# Patient Record
Sex: MALE | Race: WHITE | HISPANIC OR LATINO | Employment: UNEMPLOYED | URBAN - METROPOLITAN AREA
[De-identification: names, ages, dates, MRNs, and addresses within clinical notes are randomized per-mention and may not be internally consistent; named-entity substitution may affect disease eponyms.]

---

## 2020-07-25 ENCOUNTER — APPOINTMENT (OUTPATIENT)
Dept: RADIOLOGY | Facility: MEDICAL CENTER | Age: 60
End: 2020-07-25
Attending: EMERGENCY MEDICINE
Payer: COMMERCIAL

## 2020-07-25 ENCOUNTER — HOSPITAL ENCOUNTER (OUTPATIENT)
Facility: MEDICAL CENTER | Age: 60
End: 2020-08-07
Attending: EMERGENCY MEDICINE | Admitting: HOSPITALIST
Payer: COMMERCIAL

## 2020-07-25 DIAGNOSIS — R79.82 ELEVATED C-REACTIVE PROTEIN (CRP): ICD-10-CM

## 2020-07-25 DIAGNOSIS — M25.50 POLYARTHRALGIA: ICD-10-CM

## 2020-07-25 DIAGNOSIS — R60.9 PERIPHERAL EDEMA: ICD-10-CM

## 2020-07-25 DIAGNOSIS — M10.9 ACUTE GOUT OF KNEE, UNSPECIFIED CAUSE, UNSPECIFIED LATERALITY: ICD-10-CM

## 2020-07-25 LAB
ALBUMIN SERPL BCP-MCNC: 3.7 G/DL (ref 3.2–4.9)
ALBUMIN/GLOB SERPL: 1.1 G/DL
ALP SERPL-CCNC: 98 U/L (ref 30–99)
ALT SERPL-CCNC: 18 U/L (ref 2–50)
ANION GAP SERPL CALC-SCNC: 14 MMOL/L (ref 7–16)
AST SERPL-CCNC: 16 U/L (ref 12–45)
BASOPHILS # BLD AUTO: 0.3 % (ref 0–1.8)
BASOPHILS # BLD: 0.06 K/UL (ref 0–0.12)
BILIRUB SERPL-MCNC: 0.2 MG/DL (ref 0.1–1.5)
BUN SERPL-MCNC: 16 MG/DL (ref 8–22)
CALCIUM SERPL-MCNC: 9.1 MG/DL (ref 8.5–10.5)
CHLORIDE SERPL-SCNC: 102 MMOL/L (ref 96–112)
CO2 SERPL-SCNC: 21 MMOL/L (ref 20–33)
CREAT SERPL-MCNC: 0.75 MG/DL (ref 0.5–1.4)
CRP SERPL HS-MCNC: 12.61 MG/DL (ref 0–0.75)
EOSINOPHIL # BLD AUTO: 0.09 K/UL (ref 0–0.51)
EOSINOPHIL NFR BLD: 0.5 % (ref 0–6.9)
ERYTHROCYTE [DISTWIDTH] IN BLOOD BY AUTOMATED COUNT: 49.1 FL (ref 35.9–50)
GLOBULIN SER CALC-MCNC: 3.5 G/DL (ref 1.9–3.5)
GLUCOSE SERPL-MCNC: 101 MG/DL (ref 65–99)
HCT VFR BLD AUTO: 38 % (ref 42–52)
HGB BLD-MCNC: 12.6 G/DL (ref 14–18)
IMM GRANULOCYTES # BLD AUTO: 0.12 K/UL (ref 0–0.11)
IMM GRANULOCYTES NFR BLD AUTO: 0.7 % (ref 0–0.9)
LIPASE SERPL-CCNC: 19 U/L (ref 11–82)
LYMPHOCYTES # BLD AUTO: 2.07 K/UL (ref 1–4.8)
LYMPHOCYTES NFR BLD: 11.6 % (ref 22–41)
MCH RBC QN AUTO: 29.2 PG (ref 27–33)
MCHC RBC AUTO-ENTMCNC: 33.2 G/DL (ref 33.7–35.3)
MCV RBC AUTO: 88 FL (ref 81.4–97.8)
MONOCYTES # BLD AUTO: 0.91 K/UL (ref 0–0.85)
MONOCYTES NFR BLD AUTO: 5.1 % (ref 0–13.4)
NEUTROPHILS # BLD AUTO: 14.66 K/UL (ref 1.82–7.42)
NEUTROPHILS NFR BLD: 81.8 % (ref 44–72)
NRBC # BLD AUTO: 0 K/UL
NRBC BLD-RTO: 0 /100 WBC
PLATELET # BLD AUTO: 422 K/UL (ref 164–446)
PMV BLD AUTO: 9.4 FL (ref 9–12.9)
POTASSIUM SERPL-SCNC: 3.7 MMOL/L (ref 3.6–5.5)
PROT SERPL-MCNC: 7.2 G/DL (ref 6–8.2)
RBC # BLD AUTO: 4.32 M/UL (ref 4.7–6.1)
SODIUM SERPL-SCNC: 137 MMOL/L (ref 135–145)
URATE SERPL-MCNC: 4.6 MG/DL (ref 2.5–8.3)
WBC # BLD AUTO: 17.9 K/UL (ref 4.8–10.8)

## 2020-07-25 PROCEDURE — 80053 COMPREHEN METABOLIC PANEL: CPT

## 2020-07-25 PROCEDURE — 86038 ANTINUCLEAR ANTIBODIES: CPT

## 2020-07-25 PROCEDURE — 81003 URINALYSIS AUTO W/O SCOPE: CPT

## 2020-07-25 PROCEDURE — 86140 C-REACTIVE PROTEIN: CPT

## 2020-07-25 PROCEDURE — 700102 HCHG RX REV CODE 250 W/ 637 OVERRIDE(OP): Performed by: EMERGENCY MEDICINE

## 2020-07-25 PROCEDURE — 87591 N.GONORRHOEAE DNA AMP PROB: CPT

## 2020-07-25 PROCEDURE — 99285 EMERGENCY DEPT VISIT HI MDM: CPT

## 2020-07-25 PROCEDURE — 83690 ASSAY OF LIPASE: CPT

## 2020-07-25 PROCEDURE — 84550 ASSAY OF BLOOD/URIC ACID: CPT

## 2020-07-25 PROCEDURE — 87491 CHLMYD TRACH DNA AMP PROBE: CPT

## 2020-07-25 PROCEDURE — 85652 RBC SED RATE AUTOMATED: CPT

## 2020-07-25 PROCEDURE — 71045 X-RAY EXAM CHEST 1 VIEW: CPT

## 2020-07-25 PROCEDURE — 85025 COMPLETE CBC W/AUTO DIFF WBC: CPT

## 2020-07-25 PROCEDURE — A9270 NON-COVERED ITEM OR SERVICE: HCPCS | Performed by: EMERGENCY MEDICINE

## 2020-07-25 RX ORDER — OXYCODONE HYDROCHLORIDE AND ACETAMINOPHEN 5; 325 MG/1; MG/1
1 TABLET ORAL ONCE
Status: COMPLETED | OUTPATIENT
Start: 2020-07-25 | End: 2020-07-25

## 2020-07-25 RX ORDER — ACETAMINOPHEN 500 MG
500 TABLET ORAL EVERY 8 HOURS PRN
Status: ON HOLD | COMMUNITY
End: 2020-07-30

## 2020-07-25 RX ORDER — IBUPROFEN 200 MG
200 TABLET ORAL EVERY 8 HOURS PRN
Status: ON HOLD | COMMUNITY
End: 2020-07-30

## 2020-07-25 RX ADMIN — OXYCODONE HYDROCHLORIDE AND ACETAMINOPHEN 1 TABLET: 5; 325 TABLET ORAL at 21:52

## 2020-07-25 SDOH — HEALTH STABILITY: MENTAL HEALTH: HOW MANY STANDARD DRINKS CONTAINING ALCOHOL DO YOU HAVE ON A TYPICAL DAY?: 1 OR 2

## 2020-07-25 SDOH — HEALTH STABILITY: MENTAL HEALTH: HOW OFTEN DO YOU HAVE A DRINK CONTAINING ALCOHOL?: 4 OR MORE TIMES A WEEK

## 2020-07-25 NOTE — LETTER
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 Be specific and answer in detail. 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 N/A Witnesses to the Accident  Kofi Robret   Nature of Injury or Occupational Disease  Workers' Compensation Part(s) of Body Injured or Affected  Hand (R), Lower Arm (R), Shoulder (R)    I CERTIFY THAT THE ABOVE IS TRUE AND CORRECT TO THE BEST OF MY KNOWLEDGE AND THAT I HAVE PROVIDED THIS INFORMATION IN ORDER TO OBTAIN THE BENEFITS OF [place] " [place]IVE OR CHAPTER 617 OF [place]).  I HEREBY AUTHORIZE ANY PHYSICIAN, CHIROPRACTOR, SURGEON, PRACTITIONER, OR OTHER PERSON, ANY HOSPITAL, INCLUDING [facility] OR [facility], ANY MEDICAL SERVICE ORGANIZATION, ANY INSURANCE COMPANY, OR OTHER INSTITUTION OR ORGANIZATION TO RELEASE TO EACH OTHER, ANY MEDICAL OR OTHER INFORMATION, INCLUDING BENEFITS PAID OR PAYABLE, PERTINENT TO THIS INJURY OR DISEASE, EXCEPT INFORMATION RELATIVE TO DIAGNOSIS, TREATMENT AND/OR COUNSELING FOR AIDS, PSYCHOLOGICAL CONDITIONS, ALCOHOL OR CONTROLLED SUBSTANCES, FOR WHICH I MUST GIVE SPECIFIC AUTHORIZATION.  A PHOTOSTAT OF THIS AUTHORIZATION SHALL BE AS VALID AS THE ORIGINAL.  Date  07/26/2020                           Place    [facility]                      Employee’s Signature   THIS REPORT MUST BE COMPLETED AND MAILED WITHIN 3 WORKING DAYS OF TREATMENT   Place [facility] DEC[facility]                                                                             Name of Facility [facility]   Date  7/25/2020 Diagnosis  (R60.9) Peripheral edema  (R79.82) Elevated C-reactive protein (CRP)  (M25.50) Polyarthralgia Is there evidence the injured employee was under the influence of alcohol and/or another controlled substance at the time of accident?   Hour  8:40 AM Description of Injury or Disease  Peripheral edema  Elevated C-reactive protein (CRP)  Polyarthralgia No   Treatment  Analgesics, hospitalization for work up  Have you advised the patient to remain off work five days or more?         Yes   X-Ray Findings  Negative If Yes   From Date    To Date      From information given by the employee, together with medical evidence, can you directly connect this injury or occupational disease as job incurred?   Comments:unknown If No, is employee capable of: Full Duty  No Modified Duty  No   Is additional medical care by a physician indicated? Yes If Modified Duty, Specify any  "Limitations / Restrictions       Do you know of any previous injury or disease contributing to this condition or occupational disease? No    Date 7/26/2020 Print Doctor’s Name CanJunaid I certify the employer’s copy of this form was mailed on:   Address [address] [place] [address]  [place] [id number] [phone] INSURER’S USE ONLY   Provider’s Tax ID Number   Telephone Dept: [phone]    Doctor’s Signature isidoro-TYSHAWN Clark M.D. Degree  M.D.      Form C-4 (rev.10/07)                                                                         BRIEF DESCRIPTION OF RIGHTS AND BENEFITS  (Pursuant to [place]RS 616C.050)    Notice of Injury or Occupational Disease (Incident Report Form C-1): If an injury or occupational disease (OD) arises out of and in the course of employment, you must provide written notice to your employer as soon as practicable, but no later than 7 days after the accident or OD. Your employer shall maintain a sufficient supply of the required forms.    Claim for Compensation (Form C-4): If medical treatment is sought, the form C-4 is available at the place of initial treatment. A completed \"Claim for Compensation\" (Form C-4) must be filed within 90 days after an accident or OD. The treating physician or chiropractor must, within 3 working days after treatment, complete and mail to the employer, the employer's insurer and third-party , the Claim for Compensation.    Medical Treatment: If you require medical treatment for your on-the-job injury or OD, you may be required to select a physician or chiropractor from a list provided by your workers’ compensation insurer, if it has contracted with an Organization for Managed Care (MCO) or Preferred Provider Organization (PPO) or providers of health care. If your employer has not entered into a contract with an MCO or PPO, you may select a physician or chiropractor from the Panel of Physicians and Chiropractors. Any medical costs related to your " industrial injury or OD will be paid by your insurer.    Temporary Total Disability (TTD): If your doctor has certified that you are unable to work for a period of at least 5 consecutive days, or 5 cumulative days in a 20-day period, or places restrictions on you that your employer does not accommodate, you may be entitled to TTD compensation.    Temporary Partial Disability (TPD): If the wage you receive upon reemployment is less than the compensation for TTD to which you are entitled, the insurer may be required to pay you TPD compensation to make up the difference. TPD can only be paid for a maximum of 24 months.    Permanent Partial Disability (PPD): When your medical condition is stable and there is an indication of a PPD as a result of your injury or OD, within 30 days, your insurer must arrange for an evaluation by a rating physician or chiropractor to determine the degree of your PPD. The amount of your PPD award depends on the date of injury, the results of the PPD evaluation and your age and wage.    Permanent Total Disability (PTD): If you are medically certified by a treating physician or chiropractor as permanently and totally disabled and have been granted a PTD status by your insurer, you are entitled to receive monthly benefits not to exceed 66 2/3% of your average monthly wage. The amount of your PTD payments is subject to reduction if you previously received a PPD award.    Vocational Rehabilitation Services: You may be eligible for vocational rehabilitation services if you are unable to return to the job due to a permanent physical impairment or permanent restrictions as a result of your injury or occupational disease.    Transportation and Per Nikky Reimbursement: You may be eligible for travel expenses and per nikky associated with medical treatment.    Reopening: You may be able to reopen your claim if your condition worsens after claim closure.     Appeal Process: If you disagree with a written  determination issued by the insurer or the insurer does not respond to your request, you may appeal to the Department of Administration, , by following the instructions contained in your determination letter. You must appeal the determination within 70 days from the date of the determination letter at [address], [address], [place], [place] [address], or [address], [address], [place], [place] [address]. If you disagree with the  decision, you may appeal to the Department of Administration, . You must file your appeal within 30 days from the date of the  decision letter at [address], [address], [place], [place] [address], or [address], [address], [place], [place] [address]. If you disagree with a decision of an , you may file a petition for judicial review with the District Court. You must do so within 30 days of the Appeal Officer’s decision. You may be represented by an  at your own expense or you may contact the [facility] for possible representation.    [place]  for Injured Workers (NAIW): If you disagree with a  decision, you may request that NAIW represent you without charge at an  Hearing. For information regarding denial of benefits, you may contact the [facility] at: [address], [address], [place] [address], [phone], or [address] [place], [address], [place] [address], [phone]    To File a Complaint with the Division: If you wish to file a complaint with the  of the Division of Industrial Relations (DIR),  please contact the Workers’ Compensation Section, [address], [place], [place] [address], telephone ([phone] [phone], or [address], [address], [place], [place] [address], telephone [phone].    For assistance with Workers’ Compensation Issues: You may contact the Office of the  [facility] Consumer Health Assistance, [address]", [address], [place] [address], Toll Free 1-653.580.2383, Web site: http://govCommunity Memorial Hospital.[facility].[email or web address]., E-mail [email or web address].  D-2 (rev. 06/18)              __________________________________________________________________                                    _________________            Employee Name / Signature                                                                                                                            Date

## 2020-07-25 NOTE — LETTER
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 Be specific and answer in detail. 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 N/A Witnesses to the Accident  Kofi Robert   Nature of Injury or Occupational Disease  Workers' Compensation Part(s) of Body Injured or Affected  Hand (R), Lower Arm (R), Shoulder (R)    I CERTIFY THAT THE ABOVE IS TRUE AND CORRECT TO THE BEST OF MY KNOWLEDGE AND THAT I HAVE PROVIDED THIS INFORMATION IN ORDER TO OBTAIN THE BENEFITS OF [place] 616[place], IN[place]IVE OR CHAPTER 617 OF [place]).  I HEREBY AUTHORIZE ANY PHYSICIAN, CHIROPRACTOR, SURGEON, PRACTITIONER, OR OTHER PERSON, ANY HOSPITAL, INCLUDING [facility] OR [facility], ANY MEDICAL SERVICE ORGANIZATION, ANY INSURANCE COMPANY, OR OTHER INSTITUTION OR ORGANIZATION TO RELEASE TO EACH OTHER, ANY MEDICAL OR OTHER INFORMATION, INCLUDING BENEFITS PAID OR PAYABLE, PERTINENT TO THIS INJURY OR DISEASE, EXCEPT INFORMATION RELATIVE TO DIAGNOSIS, TREATMENT AND/OR COUNSELING FOR AIDS, PSYCHOLOGICAL CONDITIONS, ALCOHOL OR CONTROLLED SUBSTANCES, FOR WHICH I MUST GIVE SPECIFIC AUTHORIZATION.  A PHOTOSTAT OF THIS AUTHORIZATION SHALL BE AS VALID AS THE ORIGINAL.  Date                              Place: [facility]                  Employee’s Signature:   THIS REPORT MUST BE COMPLETED AND MAILED WITHIN 3 WORKING DAYS OF TREATMENT   Place CL[facility] DECISION UNIT [facility]                                                                             Name of Facility [facility]   Date  7/25/2020 Diagnosis  (R60.9) Peripheral edema  (R79.82) Elevated C-reactive protein (CRP)  (M25.50) Polyarthralgia Is there evidence the injured employee was under the influence of alcohol and/or another controlled substance at the time of accident?   Hour  6:06 AM Description of Injury or Disease  Peripheral edema  Elevated C-reactive protein (CRP)  Polyarthralgia     Treatment     Have you advised the patient to remain off work five days or more?             X-Ray Findings    If Yes   From Date    To Date      From information given by the employee, together with medical evidence, can you directly connect this injury or occupational disease as job incurred?   If No, is employee capable of: Full Duty    Modified Duty      Is additional medical care by a physician indicated?   If Modified Duty, Specify any Limitations / Restrictions     "   Do you know of any previous injury or disease contributing to this condition or occupational disease?      Date 7/26/2020 Print Doctor’s Name Junaid Notrh MELANIA certify the employer’s copy of this form was mailed on:   Address [address] [place] [address]  [place] [address] [phone] INSURER’S USE ONLY   Provider’s Tax ID Number   Telephone Dept: [phone]    Doctor’s Signature   Degree        Form C-4 (rev.10/07)                                                                         BRIEF DESCRIPTION OF RIGHTS AND BENEFITS  ([place]ursuant to [place] 616C.050)    Notice of Injury or Occupational Disease (Incident Report Form C-1): If an injury or occupational disease (OD) arises out of and in the course of employment, you must provide written notice to your employer as soon as practicable, but no later than 7 days after the accident or OD. Your employer shall maintain a sufficient supply of the required forms.    Claim for Compensation (Form C-4): If medical treatment is sought, the form C-4 is available at the place of initial treatment. A completed \"Claim for Compensation\" (Form C-4) must be filed within 90 days after an accident or OD. The treating physician or chiropractor must, within 3 working days after treatment, complete and mail to the employer, the employer's insurer and third-party , the Claim for Compensation.    Medical Treatment: If you require medical treatment for your on-the-job injury or OD, you may be required to select a physician or chiropractor from a list provided by your workers’ compensation insurer, if it has contracted with an Organization for Managed Care (MCO) or Preferred Provider Organization (PPO) or providers of health care. If your employer has not entered into a contract with an MCO or PPO, you may select a physician or chiropractor from the Panel of Physicians and Chiropractors. Any medical costs related to your industrial injury or OD will be paid by your insurer.    Temporary " Total Disability (TTD): If your doctor has certified that you are unable to work for a period of at least 5 consecutive days, or 5 cumulative days in a 20-day period, or places restrictions on you that your employer does not accommodate, you may be entitled to TTD compensation.    Temporary Partial Disability (TPD): If the wage you receive upon reemployment is less than the compensation for TTD to which you are entitled, the insurer may be required to pay you TPD compensation to make up the difference. TPD can only be paid for a maximum of 24 months.    Permanent Partial Disability (PPD): When your medical condition is stable and there is an indication of a PPD as a result of your injury or OD, within 30 days, your insurer must arrange for an evaluation by a rating physician or chiropractor to determine the degree of your PPD. The amount of your PPD award depends on the date of injury, the results of the PPD evaluation and your age and wage.    Permanent Total Disability (PTD): If you are medically certified by a treating physician or chiropractor as permanently and totally disabled and have been granted a PTD status by your insurer, you are entitled to receive monthly benefits not to exceed 66 2/3% of your average monthly wage. The amount of your PTD payments is subject to reduction if you previously received a PPD award.    Vocational Rehabilitation Services: You may be eligible for vocational rehabilitation services if you are unable to return to the job due to a permanent physical impairment or permanent restrictions as a result of your injury or occupational disease.    Transportation and Per Nikky Reimbursement: You may be eligible for travel expenses and per nikky associated with medical treatment.    Reopening: You may be able to reopen your claim if your condition worsens after claim closure.     Appeal Process: If you disagree with a written determination issued by the insurer or the insurer does not respond  to your request, you may appeal to the Department of Administration, , by following the instructions contained in your determination letter. You must appeal the determination within 70 days from the date of the determination letter at [address], [address], [place], [place] [address], or [address], [address], [place], [place] [address]. If you disagree with the  decision, you may appeal to the Department of Administration, . You must file your appeal within 30 days from the date of the  decision letter at [address], [address], [place], [place] [address], or [address], [address], [place], [place] [address]. If you disagree with a decision of an , you may file a petition for judicial review with the District Court. You must do so within 30 days of the Appeal Officer’s decision. You may be represented by an  at your own expense or you may contact the [facility] for possible representation.    [place]  for [place] ([place]IW): If you disagree with a  decision, you may request that NAIW represent you without charge at an  Hearing. For information regarding denial of benefits, you may contact the [facility] at: [address], [address], [place] [address], [phone], or [address], [address], [place] [address], [phone]    To File a Complaint with the Division: If you wish to file a complaint with the  of the Division of Industrial Relations (DIR),  please contact the Workers’ Compensation Section, [address], [address], [place], [place] [address], telephone (261) [phone], or [address], [address], [place], [place] [address], telephone [phone].    For assistance with Workers’ Compensation Issues: You may contact the Office of the Governor Consumer Health Assistance, [address], [place], [place] [address], Toll Free 1-933.138.1608, Web site: http://govUniversity Hospitals Ahuja [facility] Center.[facility].[place]., E-mail [email or web address].  D-2 (rev. 06/18)              __________________________________________________________________                                    _________________            Employee Name / Signature                                                                                                                            Date

## 2020-07-26 ENCOUNTER — APPOINTMENT (OUTPATIENT)
Dept: RADIOLOGY | Facility: MEDICAL CENTER | Age: 60
End: 2020-07-26
Attending: STUDENT IN AN ORGANIZED HEALTH CARE EDUCATION/TRAINING PROGRAM
Payer: COMMERCIAL

## 2020-07-26 ENCOUNTER — APPOINTMENT (OUTPATIENT)
Dept: RADIOLOGY | Facility: MEDICAL CENTER | Age: 60
End: 2020-07-26
Attending: STUDENT IN AN ORGANIZED HEALTH CARE EDUCATION/TRAINING PROGRAM

## 2020-07-26 ENCOUNTER — APPOINTMENT (OUTPATIENT)
Dept: RADIOLOGY | Facility: MEDICAL CENTER | Age: 60
End: 2020-07-26
Attending: ORTHOPAEDIC SURGERY

## 2020-07-26 PROBLEM — E87.6 HYPOKALEMIA: Status: ACTIVE | Noted: 2020-07-26

## 2020-07-26 PROBLEM — M25.50 POLYARTHRALGIA: Status: ACTIVE | Noted: 2020-07-26

## 2020-07-26 LAB
APPEARANCE FLD: NORMAL
APPEARANCE FLD: NORMAL
APPEARANCE UR: CLEAR
BILIRUB UR QL STRIP.AUTO: NEGATIVE
BODY FLD TYPE: NORMAL
COLOR FLD: YELLOW
COLOR FLD: YELLOW
COLOR UR: YELLOW
CRYSTALS FLD MICRO: NORMAL
CRYSTALS FLD MICRO: NORMAL
EKG IMPRESSION: NORMAL
ERYTHROCYTE [SEDIMENTATION RATE] IN BLOOD BY WESTERGREN METHOD: 59 MM/HOUR (ref 0–20)
GLUCOSE UR STRIP.AUTO-MCNC: NEGATIVE MG/DL
GRAM STN SPEC: NORMAL
GRAM STN SPEC: NORMAL
HIV 1+2 AB+HIV1 P24 AG SERPL QL IA: NORMAL
KETONES UR STRIP.AUTO-MCNC: ABNORMAL MG/DL
LEUKOCYTE ESTERASE UR QL STRIP.AUTO: NEGATIVE
LYMPHOCYTES NFR FLD: 2 %
LYMPHOCYTES NFR FLD: 4 %
MICRO URNS: ABNORMAL
MONONUC CELLS NFR FLD: 1 %
MONONUC CELLS NFR FLD: 3 %
NEUTROPHILS NFR FLD: 93 %
NEUTROPHILS NFR FLD: 97 %
NITRITE UR QL STRIP.AUTO: NEGATIVE
PH UR STRIP.AUTO: 6 [PH] (ref 5–8)
PROT UR QL STRIP: NEGATIVE MG/DL
RBC # FLD: 3000 CELLS/UL
RBC # FLD: 8000 CELLS/UL
RBC UR QL AUTO: NEGATIVE
RHEUMATOID FACT SER IA-ACNC: <10 IU/ML (ref 0–14)
SIGNIFICANT IND 70042: NORMAL
SIGNIFICANT IND 70042: NORMAL
SITE SITE: NORMAL
SITE SITE: NORMAL
SOURCE SOURCE: NORMAL
SOURCE SOURCE: NORMAL
SP GR UR STRIP.AUTO: 1.03
UROBILINOGEN UR STRIP.AUTO-MCNC: 1 MG/DL
WBC # FLD: NORMAL CELLS/UL
WBC # FLD: NORMAL CELLS/UL

## 2020-07-26 PROCEDURE — 87070 CULTURE OTHR SPECIMN AEROBIC: CPT

## 2020-07-26 PROCEDURE — 89051 BODY FLUID CELL COUNT: CPT

## 2020-07-26 PROCEDURE — A9270 NON-COVERED ITEM OR SERVICE: HCPCS | Performed by: STUDENT IN AN ORGANIZED HEALTH CARE EDUCATION/TRAINING PROGRAM

## 2020-07-26 PROCEDURE — C9803 HOPD COVID-19 SPEC COLLECT: HCPCS | Performed by: STUDENT IN AN ORGANIZED HEALTH CARE EDUCATION/TRAINING PROGRAM

## 2020-07-26 PROCEDURE — 700111 HCHG RX REV CODE 636 W/ 250 OVERRIDE (IP): Performed by: HOSPITALIST

## 2020-07-26 PROCEDURE — 96375 TX/PRO/DX INJ NEW DRUG ADDON: CPT

## 2020-07-26 PROCEDURE — 86060 ANTISTREPTOLYSIN O TITER: CPT

## 2020-07-26 PROCEDURE — 93005 ELECTROCARDIOGRAM TRACING: CPT | Performed by: EMERGENCY MEDICINE

## 2020-07-26 PROCEDURE — 86480 TB TEST CELL IMMUN MEASURE: CPT

## 2020-07-26 PROCEDURE — 86780 TREPONEMA PALLIDUM: CPT

## 2020-07-26 PROCEDURE — 700111 HCHG RX REV CODE 636 W/ 250 OVERRIDE (IP): Performed by: PHYSICIAN ASSISTANT

## 2020-07-26 PROCEDURE — 86622 BRUCELLA ANTIBODY: CPT

## 2020-07-26 PROCEDURE — 20610 DRAIN/INJ JOINT/BURSA W/O US: CPT

## 2020-07-26 PROCEDURE — 73610 X-RAY EXAM OF ANKLE: CPT | Mod: RT

## 2020-07-26 PROCEDURE — 87389 HIV-1 AG W/HIV-1&-2 AB AG IA: CPT

## 2020-07-26 PROCEDURE — 89060 EXAM SYNOVIAL FLUID CRYSTALS: CPT

## 2020-07-26 PROCEDURE — 700102 HCHG RX REV CODE 250 W/ 637 OVERRIDE(OP): Performed by: STUDENT IN AN ORGANIZED HEALTH CARE EDUCATION/TRAINING PROGRAM

## 2020-07-26 PROCEDURE — 86635 COCCIDIOIDES ANTIBODY: CPT | Mod: 91

## 2020-07-26 PROCEDURE — 73560 X-RAY EXAM OF KNEE 1 OR 2: CPT | Mod: LT

## 2020-07-26 PROCEDURE — 96372 THER/PROPH/DIAG INJ SC/IM: CPT

## 2020-07-26 PROCEDURE — G0378 HOSPITAL OBSERVATION PER HR: HCPCS

## 2020-07-26 PROCEDURE — 700111 HCHG RX REV CODE 636 W/ 250 OVERRIDE (IP): Performed by: STUDENT IN AN ORGANIZED HEALTH CARE EDUCATION/TRAINING PROGRAM

## 2020-07-26 PROCEDURE — 87040 BLOOD CULTURE FOR BACTERIA: CPT | Mod: 91

## 2020-07-26 PROCEDURE — 87205 SMEAR GRAM STAIN: CPT | Mod: 91

## 2020-07-26 PROCEDURE — 73560 X-RAY EXAM OF KNEE 1 OR 2: CPT | Mod: RT

## 2020-07-26 PROCEDURE — 86200 CCP ANTIBODY: CPT

## 2020-07-26 PROCEDURE — 73110 X-RAY EXAM OF WRIST: CPT | Mod: LT

## 2020-07-26 PROCEDURE — 86431 RHEUMATOID FACTOR QUANT: CPT

## 2020-07-26 PROCEDURE — 96374 THER/PROPH/DIAG INJ IV PUSH: CPT

## 2020-07-26 RX ORDER — LIDOCAINE HYDROCHLORIDE AND EPINEPHRINE BITARTRATE 20; .01 MG/ML; MG/ML
10 INJECTION, SOLUTION SUBCUTANEOUS ONCE
Status: DISCONTINUED | OUTPATIENT
Start: 2020-07-26 | End: 2020-07-26 | Stop reason: CLARIF

## 2020-07-26 RX ORDER — LIDOCAINE HYDROCHLORIDE 10 MG/ML
20 INJECTION, SOLUTION EPIDURAL; INFILTRATION; INTRACAUDAL; PERINEURAL ONCE
Status: COMPLETED | OUTPATIENT
Start: 2020-07-26 | End: 2020-07-26

## 2020-07-26 RX ORDER — OXYCODONE HYDROCHLORIDE 5 MG/1
5 TABLET ORAL EVERY 4 HOURS PRN
Status: DISCONTINUED | OUTPATIENT
Start: 2020-07-26 | End: 2020-07-30

## 2020-07-26 RX ORDER — ENALAPRILAT 1.25 MG/ML
1.25 INJECTION INTRAVENOUS EVERY 6 HOURS PRN
Status: DISCONTINUED | OUTPATIENT
Start: 2020-07-26 | End: 2020-08-07 | Stop reason: HOSPADM

## 2020-07-26 RX ORDER — MORPHINE SULFATE 4 MG/ML
2 INJECTION, SOLUTION INTRAMUSCULAR; INTRAVENOUS ONCE
Status: COMPLETED | OUTPATIENT
Start: 2020-07-26 | End: 2020-07-26

## 2020-07-26 RX ORDER — RIFAMPIN 300 MG/1
600 CAPSULE ORAL DAILY
Status: COMPLETED | OUTPATIENT
Start: 2020-07-26 | End: 2020-08-01

## 2020-07-26 RX ORDER — ACETAMINOPHEN 500 MG
1000 TABLET ORAL 3 TIMES DAILY
Status: DISCONTINUED | OUTPATIENT
Start: 2020-07-26 | End: 2020-08-07 | Stop reason: HOSPADM

## 2020-07-26 RX ORDER — METHYLPREDNISOLONE SODIUM SUCCINATE 40 MG/ML
40 INJECTION, POWDER, LYOPHILIZED, FOR SOLUTION INTRAMUSCULAR; INTRAVENOUS ONCE
Status: COMPLETED | OUTPATIENT
Start: 2020-07-26 | End: 2020-07-26

## 2020-07-26 RX ORDER — DOXYCYCLINE 100 MG/1
100 TABLET ORAL EVERY 12 HOURS
Status: DISCONTINUED | OUTPATIENT
Start: 2020-07-26 | End: 2020-07-30

## 2020-07-26 RX ORDER — KETOROLAC TROMETHAMINE 30 MG/ML
30 INJECTION, SOLUTION INTRAMUSCULAR; INTRAVENOUS EVERY 6 HOURS PRN
Status: DISCONTINUED | OUTPATIENT
Start: 2020-07-26 | End: 2020-07-27

## 2020-07-26 RX ADMIN — METHYLPREDNISOLONE SODIUM SUCCINATE 40 MG: 40 INJECTION, POWDER, FOR SOLUTION INTRAMUSCULAR; INTRAVENOUS at 03:37

## 2020-07-26 RX ADMIN — ACETAMINOPHEN 1000 MG: 500 TABLET ORAL at 14:50

## 2020-07-26 RX ADMIN — MORPHINE SULFATE 2 MG: 4 INJECTION INTRAVENOUS at 03:38

## 2020-07-26 RX ADMIN — OXYCODONE 5 MG: 5 TABLET ORAL at 19:57

## 2020-07-26 RX ADMIN — ENOXAPARIN SODIUM 40 MG: 40 INJECTION SUBCUTANEOUS at 06:19

## 2020-07-26 RX ADMIN — LIDOCAINE HYDROCHLORIDE 20 ML: 10 INJECTION, SOLUTION INFILTRATION; PERINEURAL at 20:19

## 2020-07-26 RX ADMIN — KETOROLAC TROMETHAMINE 30 MG: 30 INJECTION, SOLUTION INTRAMUSCULAR at 09:51

## 2020-07-26 RX ADMIN — DOXYCYCLINE 100 MG: 100 TABLET, FILM COATED ORAL at 17:49

## 2020-07-26 RX ADMIN — RIFAMPIN 600 MG: 300 CAPSULE ORAL at 15:19

## 2020-07-26 SDOH — SOCIAL STABILITY: SOCIAL NETWORK: HOW OFTEN DO YOU ATTEND CHURCH OR RELIGIOUS SERVICES?: NEVER

## 2020-07-26 SDOH — ECONOMIC STABILITY: FOOD INSECURITY: WITHIN THE PAST 12 MONTHS, YOU WORRIED THAT YOUR FOOD WOULD RUN OUT BEFORE YOU GOT MONEY TO BUY MORE.: NEVER TRUE

## 2020-07-26 SDOH — HEALTH STABILITY: PHYSICAL HEALTH: ON AVERAGE, HOW MANY DAYS PER WEEK DO YOU ENGAGE IN MODERATE TO STRENUOUS EXERCISE (LIKE A BRISK WALK)?: 6 DAYS

## 2020-07-26 SDOH — ECONOMIC STABILITY: INCOME INSECURITY: HOW HARD IS IT FOR YOU TO PAY FOR THE VERY BASICS LIKE FOOD, HOUSING, MEDICAL CARE, AND HEATING?: NOT HARD AT ALL

## 2020-07-26 SDOH — ECONOMIC STABILITY: FOOD INSECURITY: WITHIN THE PAST 12 MONTHS, THE FOOD YOU BOUGHT JUST DIDN'T LAST AND YOU DIDN'T HAVE MONEY TO GET MORE.: NEVER TRUE

## 2020-07-26 SDOH — SOCIAL STABILITY: SOCIAL NETWORK: ARE YOU MARRIED, WIDOWED, DIVORCED, SEPARATED, NEVER MARRIED, OR LIVING WITH A PARTNER?: NEVER MARRIED

## 2020-07-26 SDOH — HEALTH STABILITY: MENTAL HEALTH
STRESS IS WHEN SOMEONE FEELS TENSE, NERVOUS, ANXIOUS, OR CAN'T SLEEP AT NIGHT BECAUSE THEIR MIND IS TROUBLED. HOW STRESSED ARE YOU?: ONLY A LITTLE

## 2020-07-26 SDOH — SOCIAL STABILITY: SOCIAL NETWORK
DO YOU BELONG TO ANY CLUBS OR ORGANIZATIONS SUCH AS CHURCH GROUPS UNIONS, FRATERNAL OR ATHLETIC GROUPS, OR SCHOOL GROUPS?: NO

## 2020-07-26 SDOH — SOCIAL STABILITY: SOCIAL NETWORK: HOW OFTEN DO YOU ATTENT MEETINGS OF THE CLUB OR ORGANIZATION YOU BELONG TO?: NEVER

## 2020-07-26 SDOH — SOCIAL STABILITY: SOCIAL NETWORK: HOW OFTEN DO YOU GET TOGETHER WITH FRIENDS OR RELATIVES?: ONCE A WEEK

## 2020-07-26 SDOH — ECONOMIC STABILITY: TRANSPORTATION INSECURITY
IN THE PAST 12 MONTHS, HAS THE LACK OF TRANSPORTATION KEPT YOU FROM MEDICAL APPOINTMENTS OR FROM GETTING MEDICATIONS?: NO

## 2020-07-26 SDOH — SOCIAL STABILITY: SOCIAL NETWORK: IN A TYPICAL WEEK, HOW MANY TIMES DO YOU TALK ON THE PHONE WITH FAMILY, FRIENDS, OR NEIGHBORS?: ONCE A WEEK

## 2020-07-26 SDOH — ECONOMIC STABILITY: TRANSPORTATION INSECURITY
IN THE PAST 12 MONTHS, HAS LACK OF TRANSPORTATION KEPT YOU FROM MEETINGS, WORK, OR FROM GETTING THINGS NEEDED FOR DAILY LIVING?: NO

## 2020-07-26 ASSESSMENT — LIFESTYLE VARIABLES
SUBSTANCE_ABUSE: 0
ON A TYPICAL DAY WHEN YOU DRINK ALCOHOL HOW MANY DRINKS DO YOU HAVE: 0
EVER HAD A DRINK FIRST THING IN THE MORNING TO STEADY YOUR NERVES TO GET RID OF A HANGOVER: NO
AVERAGE NUMBER OF DAYS PER WEEK YOU HAVE A DRINK CONTAINING ALCOHOL: 0
DOES PATIENT WANT TO STOP DRINKING: NO
HOW MANY TIMES IN THE PAST YEAR HAVE YOU HAD 5 OR MORE DRINKS IN A DAY: 0
TOTAL SCORE: 0
CONSUMPTION TOTAL: NEGATIVE
HAVE YOU EVER FELT YOU SHOULD CUT DOWN ON YOUR DRINKING: NO
TOTAL SCORE: 0
EVER_SMOKED: YES
EVER FELT BAD OR GUILTY ABOUT YOUR DRINKING: NO
TOTAL SCORE: 0
HAVE PEOPLE ANNOYED YOU BY CRITICIZING YOUR DRINKING: NO
ALCOHOL_USE: YES

## 2020-07-26 ASSESSMENT — PATIENT HEALTH QUESTIONNAIRE - PHQ9
9. THOUGHTS THAT YOU WOULD BE BETTER OFF DEAD, OR OF HURTING YOURSELF: NOT AT ALL
4. FEELING TIRED OR HAVING LITTLE ENERGY: SEVERAL DAYS
6. FEELING BAD ABOUT YOURSELF - OR THAT YOU ARE A FAILURE OR HAVE LET YOURSELF OR YOUR FAMILY DOWN: NOT AL ALL
2. FEELING DOWN, DEPRESSED, IRRITABLE, OR HOPELESS: NOT AT ALL
1. LITTLE INTEREST OR PLEASURE IN DOING THINGS: NOT AT ALL
5. POOR APPETITE OR OVEREATING: SEVERAL DAYS
3. TROUBLE FALLING OR STAYING ASLEEP OR SLEEPING TOO MUCH: SEVERAL DAYS
SUM OF ALL RESPONSES TO PHQ9 QUESTIONS 1 AND 2: 0
8. MOVING OR SPEAKING SO SLOWLY THAT OTHER PEOPLE COULD HAVE NOTICED. OR THE OPPOSITE, BEING SO FIGETY OR RESTLESS THAT YOU HAVE BEEN MOVING AROUND A LOT MORE THAN USUAL: NOT AT ALL
7. TROUBLE CONCENTRATING ON THINGS, SUCH AS READING THE NEWSPAPER OR WATCHING TELEVISION: SEVERAL DAYS
SUM OF ALL RESPONSES TO PHQ QUESTIONS 1-9: 4

## 2020-07-26 ASSESSMENT — ENCOUNTER SYMPTOMS
HEMOPTYSIS: 0
BACK PAIN: 0
NAUSEA: 0
DOUBLE VISION: 0
PHOTOPHOBIA: 0
CHILLS: 0
MYALGIAS: 1
NECK PAIN: 0
TINGLING: 0
SPUTUM PRODUCTION: 0
TREMORS: 0
PALPITATIONS: 0
BRUISES/BLEEDS EASILY: 0
ABDOMINAL PAIN: 0
WEAKNESS: 1
FEVER: 0

## 2020-07-26 ASSESSMENT — COPD QUESTIONNAIRES
IN THE PAST 12 MONTHS DO YOU DO LESS THAN YOU USED TO BECAUSE OF YOUR BREATHING PROBLEMS: DISAGREE/UNSURE
COPD SCREENING SCORE: 3
DO YOU EVER COUGH UP ANY MUCUS OR PHLEGM?: NO/ONLY WITH OCCASIONAL COLDS OR INFECTIONS
HAVE YOU SMOKED AT LEAST 100 CIGARETTES IN YOUR ENTIRE LIFE: YES
DURING THE PAST 4 WEEKS HOW MUCH DID YOU FEEL SHORT OF BREATH: NONE/LITTLE OF THE TIME

## 2020-07-26 ASSESSMENT — FIBROSIS 4 INDEX
FIB4 SCORE: 0.52
FIB4 SCORE: 0.52

## 2020-07-26 NOTE — ASSESSMENT & PLAN NOTE
- No previous Hx of Rheumatologic disease  - Denied family history of Rheumatoid conditions  - Patient stated that in the past he has had Joint swelling and pain, but he has been able to tolerate it and resolve with OTC NSAID's  - This is the first time it get very severe and first time that this many joints are involved  - Ankles, right knee and feet resolved pain and swelling.  - 7/30 Left Knee improved, patient now walking on hallways on his own, still has reduced ROM, but much better.  - Wrists and fingers continue to  Improve, left wrists still swelling more evident on his left side, ROM worse as well.  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    - VS at ED unremarkable  - Imaging knees showed mild degenerative disease  - Admission Labs: Cbc- 17.9 wbc, hb 12.6, Crp-12.61 (elevated) on, Esr-elevated at 56, Uric acid- 4.6  - ID consulted  On 7/27  - 8/6 Continues to improve in functioning and mobility on his left knee. Knees and feet back to baseline, no swelling or pain. Right wrist mild swelling and diminished ROM, middle right finger moderate swelling with severe restricted ROM.  Left wrist moderate swelling and decreased ROM, left 2,3,4 finger still moderate swelling and severe limitation ROM.  Pain is mild/moderate.  -  8/6 CM in daily communication with [employer] (workers comp) will NOT authorize patient approval.  - 8/6 DC on 8/6 failed, patient's ride also did not show up.  - 8/7 Patient told early morning that he will be DC with or W/O ride           He is medically clear for safe DC.                      PLAN:  - DC home  - OTC NSAID's  - Topic OTC NSAID's  - Establish PCP  - Continue Therapy   - Rheum follow up  - ID follow up

## 2020-07-26 NOTE — ED NOTES
Med rec complete per interview with patient at bedside.  NKDA.  No oral ABX taken in past 14 days.

## 2020-07-26 NOTE — SENIOR ADMIT NOTE
Senior Admit Note    Fede Huber is a 59 y.o. male with no known past medical history who presented to the hospital with symptoms of joint pain that started on July 22nd, 2020. The patient states that he was at work when he started to have pain in his left shoulder, bilateral knees, and bilateral ankles. The patient stated that the pain started out of nowhere. Stated that he had not had these symptoms previously. 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 Denies any fever or chills.     Pertinent Physical Exam:  General: No acute distress  HEENT: PERRL.  Heart: RRR. Neg. S3. Neg. S4.  Lungs: CTA throughout. No rales. No wheeze  Abdomen: Soft, nontender. Normal bowel sounds.  Extremities: Swelling and tenderness in bilateral wrists, MCPs, PIPs. Swelling and tenderness in bilateral knees and ankles.   Neuro: A&O x4. No focal deficits.  Skin: Chronic lesions on left lower extremity, no apparent open wounds.    Labs:   WBC: 17,900  Hemoglobin: 12.6, Hematocrit 38.0, platelets: 422  ESR: 59, CRP 12.61    Assessment & Plan  #Polyarthralgia  #Joint Swelling  -Unclear etiology, though appears autoimmune; Septic arthritis highly unlikely with multiple joint involvement. No source of infection. Patient not diabetic or at particular risk for osteomyelitis.  -Patient works on a goat farm. Does not appear to have Q fever or coccidioidomycoses. Joint swelling can appear in anthrax but this is also highly unlikely.  -ESR and C-RP are elevated  -Admit  -Autoimmune workup: Check ARTI, Anti-CCp, ASO antibodies  -Trial of steroids  -Follow blood cultures    Please see Dr. Early's H&P for full details    Jake Bland D.O., [facility] Internal Medicine Resident

## 2020-07-26 NOTE — PROGRESS NOTES
Admission completed. Patient given pain medication but not complete relief from extreme joint pain. Does not wish to eat at the present time. Preparing to sleep. Friend (interpretor) at side. Heat applied to hands and knees.

## 2020-07-26 NOTE — PROCEDURES
Patient seen per request of Dr. Hancock for joint aspiration of knees.   Diagnostic imaging revealed right knee effusion. There is no diagnostic imaging Left knee.   Physical examination was positive for effusion and heat,  and negative for erythema.    The indications, risks, benefits, and alternatives of joint aspiration were presented to the patient.  Understanding this, the patient wished to proceed.   The knees was prepped with betadine and a small wheal was created subcutaneously with 2 mL of 2% lidocaine located about the lateral superior patellar poles.   18 gauge needles were swiftly introduced into the synovial space utilizing aseptic technique and 10 mL of cloudy straw-colored aspirate was obtained. A dry sterile dressing was placed utilizing a 2x2 gauze pads, then covered snugly with an ace wrap. The distal extremity remained NVTI throughout and after the procedure. There was minimal blood loss, no complications, patient tolerated the simultaneous procedures well .   Total procedure time, including obtaining verbal consent, patient education, preparation/draping, and dressing took about 20 minutes.Dr Hancock performed the left knee arthrocentesis while I did the right.

## 2020-07-26 NOTE — H&P
History & Physical Note    Date of Admission: 7/26/2020  Admission Status: Observation-Outpatient  [facility] Team: [facility] Green Team  Attending: Dr. Robert  Senior Resident: Dr. Starr  Intern: Dr. Davison  Contact Number: [phone]    Chief Complaint: Pain (Pt reports swelling and pain in hands, fingers, shoulders, knees, and feet. Pain/swelling started Sunday, worsening on Wednesday. )       History of Present Illness (HPI): Patient is a 59-year-old male with no known past rheumatological history, no chronic diseases that he is aware of, no remarkable sexual history, no autoimmune diseases in himself nor his family members, no recent illness nor sick contacts, presenting with a 3-day course of intermittent swelling of his extremities.  His friend, who was at bedside with the patient, reports that he was called by Mr. Miller as he was having excruciating pain; when he went to the patient's house,the patient was laying prone on the floor.  He had been trying to crawl to the bathroom and could not support his weight due to pain.    States that initially he noticed swelling of his left arm and right foot, but it has progressively worsened to involve his full bilateral extremities.  He is unable to think of any precipitating event that may have caused this.  States that his pain limits the range of motion of his hands especially; asked to characterize the nature of the pain, he reports that it burns,and describes that he feels it down to his bones (notes that is more from internal processes been from external sensation).  Took Motrin and Tylenol, which did not alleviate symptoms.  This is the first time he is experienced this presentation in his life.  No open cuts.  No discoloration that he is aware of.  Skin feels warm, subjectively.  He has been unable to work ordo day-to-day activities due to the pain.  Of note is that his job is extremely physical; he works on a farm milk unit (typical daily activity includes, but  is not limited to, feeling 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  He denies fever, chills, nausea, vomit.      Ed- temp 98.6, 75 pulse, 14 RR, 148/90, 95 02 on room air    Cbc- 17.9 wbc, hb 12.6    Cmp- k 3.7, bun/creat 16/.75    Lipase- 19, unremarkable    Crp-12.61 (elevated)    U/a-pending    Esr-pending    Uric acid- 4.6    Chlamydia/gonorrhea swab- pending    Cxr-no cardiopulm abnr        Review of Systems: Review of Systems   Constitutional: Positive for malaise/fatigue. Negative for chills and fever.   HENT: Negative for ear pain and tinnitus.    Eyes: Negative for double vision and photophobia.   Respiratory: Negative for hemoptysis and sputum production.    Cardiovascular: Positive for leg swelling. Negative for chest pain and palpitations.   Gastrointestinal: Negative for abdominal pain and nausea.   Genitourinary: Negative for frequency and urgency.   Musculoskeletal: Positive for myalgias. Negative for back pain and neck pain.   Skin: Negative for itching and rash.   Neurological: Positive for weakness. Negative for tingling and tremors.   Endo/Heme/Allergies: Negative for environmental allergies. Does not bruise/bleed easily.   Psychiatric/Behavioral: Negative for substance abuse and suicidal ideas.        Past Medical History:    has a past medical history of Arthritis.    Past Surgical History:  has a past surgical history that includes eye surgery.    Medications:   Prior to Admission Medications   Prescriptions Last Dose Informant Patient Reported? Taking?   acetaminophen (TYLENOL) 500 MG Tab 7/25/2020 at AM Patient Yes Yes   Sig: Take 500 mg by mouth every 8 hours as needed for Mild Pain.   ibuprofen (MOTRIN) 200 MG Tab 7/25/2020 at AM Patient Yes Yes   Sig: Take 200 mg by mouth every 8 hours as needed for Mild Pain.      Facility-Administered Medications: None        Allergies: No Known Allergies    Social history-drinks 2-3 beers daily, or every other day;  occasionally has 1 cigarette/day; no recreational drug use; works in a milking unit on a [employer]; lives alone in a home in [place]    Family history-no rheumatologic conditions, no autoimmune conditions MI: Father-passed away from complications of pneumonia; mother, no known medical conditionsNone      Vitals:  Temp:  [36.8 °C (98.2 °F)-37 °C (98.6 °F)] 36.8 °C (98.2 °F)  Pulse:  [67-75] 68  Resp:  [14-20] 20  BP: (130-160)/(76-90) 160/77  SpO2:  [95 %-99 %] 98 %    Physical Exam  Constitutional:       Appearance: Normal appearance.   HENT:      Head: Normocephalic and atraumatic.      Right Ear: Tympanic membrane normal.      Left Ear: Tympanic membrane normal.      Nose: Nose normal.      Mouth/Throat:      Mouth: Mucous membranes are moist.      Pharynx: Oropharynx is clear.   Eyes:      Extraocular Movements: Extraocular movements intact.      Conjunctiva/sclera: Conjunctivae normal.      Pupils: Pupils are equal, round, and reactive to light.   Neck:      Musculoskeletal: No neck rigidity or muscular tenderness.   Cardiovascular:      Rate and Rhythm: Tachycardia present.      Heart sounds: No murmur. No gallop.    Pulmonary:      Effort: No respiratory distress.      Breath sounds: No stridor. No wheezing.   Abdominal:      General: There is no distension.      Tenderness: There is no abdominal tenderness. There is no rebound.   Musculoskeletal:         General: No deformity or signs of injury.      Right lower leg: Edema present.      Left lower leg: Edema present.      Comments: Edema of bilateral UE and LE   Skin:     Coloration: Skin is not jaundiced or pale.   Neurological:      Mental Status: He is alert and oriented to person, place, and time.      Motor: No weakness.      Coordination: Coordination normal.         Labs:   Results for orders placed or performed during the hospital encounter of 07/25/20   CBC WITH DIFFERENTIAL   Result Value Ref Range    WBC 17.9 (H) 4.8 - 10.8 K/uL    RBC 4.32 (L) 4.70 -  6.10 M/uL    Hemoglobin 12.6 (L) 14.0 - 18.0 g/dL    Hematocrit 38.0 (L) 42.0 - 52.0 %    MCV 88.0 81.4 - 97.8 fL    MCH 29.2 27.0 - 33.0 pg    MCHC 33.2 (L) 33.7 - 35.3 g/dL    RDW 49.1 35.9 - 50.0 fL    Platelet Count 422 164 - 446 K/uL    MPV 9.4 9.0 - 12.9 fL    Neutrophils-Polys 81.80 (H) 44.00 - 72.00 %    Lymphocytes 11.60 (L) 22.00 - 41.00 %    Monocytes 5.10 0.00 - 13.40 %    Eosinophils 0.50 0.00 - 6.90 %    Basophils 0.30 0.00 - 1.80 %    Immature Granulocytes 0.70 0.00 - 0.90 %    Nucleated RBC 0.00 /100 WBC    Neutrophils (Absolute) 14.66 (H) 1.82 - 7.42 K/uL    Lymphs (Absolute) 2.07 1.00 - 4.80 K/uL    Monos (Absolute) 0.91 (H) 0.00 - 0.85 K/uL    Eos (Absolute) 0.09 0.00 - 0.51 K/uL    Baso (Absolute) 0.06 0.00 - 0.12 K/uL    Immature Granulocytes (abs) 0.12 (H) 0.00 - 0.11 K/uL    NRBC (Absolute) 0.00 K/uL   COMP METABOLIC PANEL   Result Value Ref Range    Sodium 137 135 - 145 mmol/L    Potassium 3.7 3.6 - 5.5 mmol/L    Chloride 102 96 - 112 mmol/L    Co2 21 20 - 33 mmol/L    Anion Gap 14.0 7.0 - 16.0    Glucose 101 (H) 65 - 99 mg/dL    Bun 16 8 - 22 mg/dL    Creatinine 0.75 0.50 - 1.40 mg/dL    Calcium 9.1 8.5 - 10.5 mg/dL    AST(SGOT) 16 12 - 45 U/L    ALT(SGPT) 18 2 - 50 U/L    Alkaline Phosphatase 98 30 - 99 U/L    Total Bilirubin 0.2 0.1 - 1.5 mg/dL    Albumin 3.7 3.2 - 4.9 g/dL    Total Protein 7.2 6.0 - 8.2 g/dL    Globulin 3.5 1.9 - 3.5 g/dL    A-G Ratio 1.1 g/dL   LIPASE   Result Value Ref Range    Lipase 19 11 - 82 U/L   URINALYSIS CULTURE, IF INDICATED    Specimen: Urine   Result Value Ref Range    Color Yellow     Character Clear     Specific Gravity 1.027 <1.035    Ph 6.0 5.0 - 8.0    Glucose Negative Negative mg/dL    Ketones Trace (A) Negative mg/dL    Protein Negative Negative mg/dL    Bilirubin Negative Negative    Urobilinogen, Urine 1.0 Negative    Nitrite Negative Negative    Leukocyte Esterase Negative Negative    Occult Blood Negative Negative    Micro Urine Req see below     CRP QUANTITIVE (NON-CARDIAC)   Result Value Ref Range    Stat C-Reactive Protein 12.61 (H) 0.00 - 0.75 mg/dL   ESTIMATED GFR   Result Value Ref Range    GFR If African American >60 >60 mL/min/1.73 m 2    GFR If Non African American >60 >60 mL/min/1.73 m 2   Sed Rate   Result Value Ref Range    Sed Rate Westergren 59 (H) 0 - 20 mm/hour   URIC ACID   Result Value Ref Range    Uric Acid 4.6 2.5 - 8.3 mg/dL   Chlamydia/GC PCR Urine Or Swab    Specimen: Urine, First Catch   Result Value Ref Range    Source Urine    EKG (NOW)   Result Value Ref Range    Report       [facility] Emergency Dept.    Test Date:  2020  Pt Name:    SHARON RICHMOND       Department: ER  MRN:        [id number]                      Room:        04  Gender:     Male                         Technician: [id number]  :        1960                   Requested By:NICOLA NORTH  Order #:    [id number]                    Reading MD: Nicola North    Measurements  Intervals                                Axis  Rate:       69                           P:          37  NV:         180                          QRS:        26  QRSD:       94                           T:          38  QT:         400  QTc:        429    Interpretive Statements  SINUS RHYTHM  CONSIDER LEFT VENTRICULAR HYPERTROPHY  BASELINE WANDER IN LEAD(S) I,III,aVR,aVL  No previous ECG available for comparison  Electronically Signed On 2020 0:35:03 PDT by Nicola North          EKG:   Results for orders placed or performed during the hospital encounter of 20   EKG (NOW)   Result Value Ref Range    Report       [facility] Emergency Dept.    Test Date:  2020  Pt Name:    SHARON RICHMOND       Department: ER  MRN:        [id number]                      Room:       RD 04  Gender:     Male                         Technician: [id number]  :        1960                   Requested By:NICOLA NORTH  Order #:    [id number]                     Reading MD: Junaid North    Measurements  Intervals                                Axis  Rate:       69                           P:          37  TX:         180                          QRS:        26  QRSD:       94                           T:          38  QT:         400  QTc:        429    Interpretive Statements  SINUS RHYTHM  CONSIDER LEFT VENTRICULAR HYPERTROPHY  BASELINE WANDER IN LEAD(S) I,III,aVR,aVL  No previous ECG available for comparison  Electronically Signed On 7- 0:35:03 PDT by Junaid North          Imaging:   DX-CHEST-PORTABLE (1 VIEW)   Final Result      No acute cardiopulmonary abnormality.      DX-WRIST-COMPLETE 3+ LEFT    (Results Pending)   DX-ANKLE 3+ VIEWS RIGHT    (Results Pending)   DX-KNEE 2- RIGHT    (Results Pending)        Previous Data Review: Reviewed    Polyarthralgia with peripheral swelling  Assessment & Plan  Patient is a 59-year-old male with no known past rheumatological history, no chronic diseases that he is aware of, no remarkable sexual history, no autoimmune diseases in himself nor his family members, no recent illness nor sick contacts, presenting with a 3-day course of intermittent swelling of his extremities.  His friend, who was at bedside with the patient, reports that he was called by Mr. Miller as he was having excruciating pain; when he went to the patient's house,the patient was laying prone on the floor.  He had been trying to crawl to the bathroom and could not support his weight due to pain.    States that initially he noticed swelling of his left arm and right foot, but it has progressively worsened to involve his full bilateral extremities.  He is unable to think of any precipitating event that may have caused this.  States that his pain limits the range of motion of his hands especially; asked to characterize the nature of the pain, he reports that it burns,and describes that he feels it down to his bones (notes that is more from internal  processes been from external sensation).  Took Motrin and Tylenol, which did not alleviate symptoms.  This is the first time he is experienced this presentation in his life.  No open cuts.  No discoloration that he is aware of.  Skin feels warm, subjectively.  He has been unable to work ordo day-to-day activities due to the pain.  Of note is that his job is extremely physical; he works on a farm milk unit (typical daily activity includes, but is not limited to, feeling 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      Ed- temp 98.6, 75 pulse, 14 RR, 148/90, 95 02 on room air  Cbc- 17.9 wbc, hb 12.6  Crp-12.61 (elevated)  Esr-elevated at 59  Uric acid- 4.6  Follow up with autoimmune work up (I.e. ARTI, antistreptolysin antibodies  Pain management- ordered one time dose IV steroid and morphine as patient was in extreme distress; ideally when morphine wears off will be able to assess lasting effect of IV steroid to guide what inflammatory process this may be  PT/OT   Follow CBC        Hypokalemia  Assessment & Plan  K 3.7 on admission  CTM and replete

## 2020-07-26 NOTE — PROGRESS NOTES
Triage officer note: 58 y/o male polyarthralgia and swelling.    Dr Tovar already discussed the case with ERP Dr North.  [facility] IM resident Dr Bland to assume care of the patient.

## 2020-07-26 NOTE — ED TRIAGE NOTES
"Fede Huber  59 y.o. male  Chief Complaint   Patient presents with   • Pain     Pt reports swelling and pain in hands, fingers, shoulders, knees, and feet. Pain/swelling started Sunday, worsening on Wednesday.      Pt wheeled to triage for above complaint.     Pt brought in by work colleague as patient was no longer able to work due to pain.      Pt denies travel outside [facility] in the past 14 days, and denies contact with COVID positive person.      Pt back to lobby, mask applied. Educated to inform staff of any concerns or changes.     Blood Pressure: 148/90, Pulse: 75, Respiration: 14, Temperature: 37 °C (98.6 °F), Height: 169 cm (5' 6.54\"), Weight: 77 kg (169 lb 12.1 oz), BMI (Calculated): 26.96, BSA (Calculated): 1.9, Pulse Oximetry: 95 %, O2 Delivery Device: None - Room Air    Armenian interpretor service utilized.    "

## 2020-07-26 NOTE — ED PROVIDER NOTES
ED Provider Note    Scribed for Junaid North M.D. by Yared Leos. 7/25/2020,  8:52 PM.    Means of Arrival: Walk-in  History obtained from: Patient  History limited by: None    CHIEF COMPLAINT  Chief Complaint   Patient presents with   • Pain     Pt reports swelling and pain in hands, fingers, shoulders, knees, and feet. Pain/swelling started Sunday, worsening on Wednesday.        HPI  Fede Huber is a 59 y.o. 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 The swelling and pain have worsened since then, prompting him to present here today. Per patient, he burns prominent veins on his left ankle and last burned the veins eight days ago. He has been taking Motrin without improvement. He denies vision changes, headache dysuria, dental pain, or chest pain. He has been milking goats for about 15 years and this is reportedly a very damp work environment. He denies a family history of lupus, rheumatoid arthritis, or other autoimmune diseases. He does not take any regular medications other than supplementary vitamins.    In room converstation interpreted from [place] by his friend. He was offered a professional  and declined.    PPE Note: I personally donned full PPE for all patient encounters during this visit, including being clean-shaven with an surgical mask, gloves, and goggles.     Scribe remained outside the patient's room and did not have any contact with the patient for the duration of patient encounter.    REVIEW OF SYSTEMS  EYES: No vision changes.  CARDIOVASCULAR:  No chest discomfort.  GENITOURINARY:   No dysuria.  MUSCULOSKELETAL:  Positive for extremity pain and swelling.  NEUROLOGIC:   No headache.    See HPI for further details.   All other systems are negative.     PAST MEDICAL HISTORY  History reviewed. No pertinent past medical  "history.    FAMILY HISTORY  History reviewed. No pertinent family history.    SOCIAL HISTORY   reports that he has been smoking cigarettes. He has a 2.50 pack-year smoking history. He has never used smokeless tobacco. He reports current alcohol use. He reports previous drug use.    SURGICAL HISTORY  History reviewed. No pertinent surgical history.    CURRENT MEDICATIONS  Home Medications     Reviewed by Lisbet Andrade (Pharmacy Tech) on 20 at 2348  Med List Status: Complete   Medication Last Dose Status   acetaminophen (TYLENOL) 500 MG Tab 2020 Active   ibuprofen (MOTRIN) 200 MG Tab 2020 Active                ALLERGIES  No Known Allergies    PHYSICAL EXAM  VITAL SIGNS: /90   Pulse 75   Temp 37 °C (98.6 °F) (Oral)   Resp 14   Ht 1.69 m (5' 6.54\")   Wt 77 kg (169 lb 12.1 oz)   SpO2 95%   BMI 26.96 kg/m²    Gen: Alert, no acute distress  HEENT: ATNC, normal oropharynx  Eyes: PERRL, EOMI, normal conjunctiva.   Neck: trachea midline  Resp: no respiratory distress, Lungs clear, no wheezes or crackles  CV: No JVD, Regular rate no murmur  Abd: non-distended, nontender  Skin: No warmth or erythema, Several scabs of the left lower extremity without erythema or discharge  Ext: No deformities, Edema of bilateral hands and feet with tenderness, Pain with passive ROM of the bilateral knees  Psych: normal mood  Neuro: speech fluent    DIAGNOSTIC STUDIES / PROCEDURES     EKG  Results for orders placed or performed during the hospital encounter of 20   EKG (NOW)   Result Value Ref Range    Report       [facility] Emergency Dept.    Test Date:  2020  Pt Name:    SHARON YI       Department: ER  MRN:        [id number]                      Room:        04  Gender:     Male                         Technician: [id number]  :        1960                   Requested By:JUNAID LONDON  Order #:    [id number]                    Reading MD: Junaid " Can    Measurements  Intervals                                Axis  Rate:       69                           P:          37  MS:         180                          QRS:        26  QRSD:       94                           T:          38  QT:         400  QTc:        429    Interpretive Statements  SINUS RHYTHM  CONSIDER LEFT VENTRICULAR HYPERTROPHY  BASELINE WANDER IN LEAD(S) I,III,aVR,aVL  No previous ECG available for comparison  Electronically Signed On 7- 0:35:03 PDT by Junaid North          LABS  Labs Reviewed   CBC WITH DIFFERENTIAL - Abnormal; Notable for the following components:       Result Value    WBC 17.9 (*)     RBC 4.32 (*)     Hemoglobin 12.6 (*)     Hematocrit 38.0 (*)     MCHC 33.2 (*)     Neutrophils-Polys 81.80 (*)     Lymphocytes 11.60 (*)     Neutrophils (Absolute) 14.66 (*)     Monos (Absolute) 0.91 (*)     Immature Granulocytes (abs) 0.12 (*)     All other components within normal limits   COMP METABOLIC PANEL - Abnormal; Notable for the following components:    Glucose 101 (*)     All other components within normal limits   URINALYSIS,CULTURE IF INDICATED - Abnormal; Notable for the following components:    Ketones Trace (*)     All other components within normal limits   CRP QUANTITIVE (NON-CARDIAC) - Abnormal; Notable for the following components:    Stat C-Reactive Protein 12.61 (*)     All other components within normal limits   LIPASE   ESTIMATED GFR   SED RATE   URIC ACID   CHLAMYDIA/GC PCR URINE OR SWAB   ARTI REFLEXIVE PROFILE     All labs reviewed by me.    RADIOLOGY  DX-CHEST-PORTABLE (1 VIEW)   Final Result      No acute cardiopulmonary abnormality.        The radiologist’s interpretation of all radiology studies have been reviewed by me.    COURSE & MEDICAL DECISION MAKING  Pertinent Labs & Imaging studies reviewed. (See chart for details)    8:52 PM Patient seen and examined at bedside. Ordered for labs and imaging to evaluate. Patient will be treated with  oxycodone-acetaminophen 5-325 mg for his symptoms.    11:18 PM Patient was reevaluated at bedside. Patient's pain has not improved. Discussed lab and radiology results with the patient and informed them of the plan for admission.    11:31 PM I discussed the patient's case and the above findings with Dr. Cochran (Hosptialist) who would like me to admit to [facility]    11:43 PM I discussed the patient's case and the above findings with Dr. Bland ([facility]) who will evaluate for hospitalization.    Medical Decision Making:  Patient presents with acral swelling, pain, polyarthralgia.  His labs are concerning for inflammatory process with leukocytosis and marked elevation of his CRP.  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  No evidence of cutaneous anthrax.    Patient symptoms do not appear to fit with Q fever.  He denies any family history of autoimmune disease, however this appears to fit with an inflammatory process, specifically concerning for a type of autoimmune disease.  No evidence of pericarditis based on symptoms or EKG.  It does not appear to be involving his liver or kidneys, however I believe the patient is unlikely to have adequate follow-up, and given his markedly elevated inflammatory markers, I believe he will require hospitalization for further evaluation.  The patient's edema does not appear to fit with dependent edema or heart failure.        DISPOSITION:  Patient will be hospitalized by Dr. Bland ([facility]) in guarded condition.    FINAL IMPRESSION  1. Peripheral edema    2. Elevated C-reactive protein (CRP)    3. Polyarthralgia            IYared (Flory), am scribing for, and in the presence of, Junaid North M.D..    Electronically signed by: Yared Leos (Flory), 7/25/2020    Junaid VELÁZQUEZ M.D. personally performed the services described in this documentation, as scribed  by Yared Leos in my presence, and it is both accurate and complete.    C    The note accurately reflects work and decisions made by me.  Junaid North M.D.  7/26/2020  12:22 AM

## 2020-07-27 PROBLEM — M10.9 ACUTE GOUT OF KNEE: Status: ACTIVE | Noted: 2020-07-27

## 2020-07-27 LAB
ALBUMIN SERPL BCP-MCNC: 3.5 G/DL (ref 3.2–4.9)
ALBUMIN/GLOB SERPL: 1.2 G/DL
ALP SERPL-CCNC: 82 U/L (ref 30–99)
ALT SERPL-CCNC: 18 U/L (ref 2–50)
ANION GAP SERPL CALC-SCNC: 11 MMOL/L (ref 7–16)
AST SERPL-CCNC: 12 U/L (ref 12–45)
BASOPHILS # BLD AUTO: 0.6 % (ref 0–1.8)
BASOPHILS # BLD: 0.08 K/UL (ref 0–0.12)
BILIRUB SERPL-MCNC: 0.4 MG/DL (ref 0.1–1.5)
BUN SERPL-MCNC: 26 MG/DL (ref 8–22)
C TRACH DNA SPEC QL NAA+PROBE: NEGATIVE
CALCIUM SERPL-MCNC: 8.6 MG/DL (ref 8.5–10.5)
CHLORIDE SERPL-SCNC: 101 MMOL/L (ref 96–112)
CO2 SERPL-SCNC: 25 MMOL/L (ref 20–33)
CREAT SERPL-MCNC: 0.72 MG/DL (ref 0.5–1.4)
EOSINOPHIL # BLD AUTO: 0.07 K/UL (ref 0–0.51)
EOSINOPHIL NFR BLD: 0.5 % (ref 0–6.9)
ERYTHROCYTE [DISTWIDTH] IN BLOOD BY AUTOMATED COUNT: 48 FL (ref 35.9–50)
GLOBULIN SER CALC-MCNC: 2.9 G/DL (ref 1.9–3.5)
GLUCOSE SERPL-MCNC: 108 MG/DL (ref 65–99)
HCT VFR BLD AUTO: 36.5 % (ref 42–52)
HGB BLD-MCNC: 11.9 G/DL (ref 14–18)
IMM GRANULOCYTES # BLD AUTO: 0.12 K/UL (ref 0–0.11)
IMM GRANULOCYTES NFR BLD AUTO: 0.9 % (ref 0–0.9)
LYMPHOCYTES # BLD AUTO: 2.9 K/UL (ref 1–4.8)
LYMPHOCYTES NFR BLD: 21.2 % (ref 22–41)
MCH RBC QN AUTO: 28.6 PG (ref 27–33)
MCHC RBC AUTO-ENTMCNC: 32.6 G/DL (ref 33.7–35.3)
MCV RBC AUTO: 87.7 FL (ref 81.4–97.8)
MONOCYTES # BLD AUTO: 1.14 K/UL (ref 0–0.85)
MONOCYTES NFR BLD AUTO: 8.3 % (ref 0–13.4)
N GONORRHOEA DNA SPEC QL NAA+PROBE: NEGATIVE
NEUTROPHILS # BLD AUTO: 9.36 K/UL (ref 1.82–7.42)
NEUTROPHILS NFR BLD: 68.5 % (ref 44–72)
NRBC # BLD AUTO: 0 K/UL
NRBC BLD-RTO: 0 /100 WBC
PLATELET # BLD AUTO: 437 K/UL (ref 164–446)
PMV BLD AUTO: 9.2 FL (ref 9–12.9)
POTASSIUM SERPL-SCNC: 3.5 MMOL/L (ref 3.6–5.5)
PROT SERPL-MCNC: 6.4 G/DL (ref 6–8.2)
RBC # BLD AUTO: 4.16 M/UL (ref 4.7–6.1)
SODIUM SERPL-SCNC: 137 MMOL/L (ref 135–145)
SPECIMEN SOURCE: NORMAL
TREPONEMA PALLIDUM IGG+IGM AB [PRESENCE] IN SERUM OR PLASMA BY IMMUNOASSAY: NORMAL
WBC # BLD AUTO: 13.7 K/UL (ref 4.8–10.8)

## 2020-07-27 PROCEDURE — 96376 TX/PRO/DX INJ SAME DRUG ADON: CPT

## 2020-07-27 PROCEDURE — 97166 OT EVAL MOD COMPLEX 45 MIN: CPT

## 2020-07-27 PROCEDURE — 700102 HCHG RX REV CODE 250 W/ 637 OVERRIDE(OP): Performed by: STUDENT IN AN ORGANIZED HEALTH CARE EDUCATION/TRAINING PROGRAM

## 2020-07-27 PROCEDURE — 86698 HISTOPLASMA ANTIBODY: CPT | Mod: 91

## 2020-07-27 PROCEDURE — 86612 BLASTOMYCES ANTIBODY: CPT

## 2020-07-27 PROCEDURE — A9270 NON-COVERED ITEM OR SERVICE: HCPCS | Performed by: STUDENT IN AN ORGANIZED HEALTH CARE EDUCATION/TRAINING PROGRAM

## 2020-07-27 PROCEDURE — 96372 THER/PROPH/DIAG INJ SC/IM: CPT

## 2020-07-27 PROCEDURE — 80053 COMPREHEN METABOLIC PANEL: CPT

## 2020-07-27 PROCEDURE — 86618 LYME DISEASE ANTIBODY: CPT

## 2020-07-27 PROCEDURE — 85025 COMPLETE CBC W/AUTO DIFF WBC: CPT

## 2020-07-27 PROCEDURE — 700111 HCHG RX REV CODE 636 W/ 250 OVERRIDE (IP): Performed by: STUDENT IN AN ORGANIZED HEALTH CARE EDUCATION/TRAINING PROGRAM

## 2020-07-27 PROCEDURE — 97162 PT EVAL MOD COMPLEX 30 MIN: CPT

## 2020-07-27 PROCEDURE — 36415 COLL VENOUS BLD VENIPUNCTURE: CPT

## 2020-07-27 PROCEDURE — G0378 HOSPITAL OBSERVATION PER HR: HCPCS

## 2020-07-27 PROCEDURE — 99226 PR SUBSEQUENT OBSERVATION CARE,LEVEL III: CPT | Mod: GC | Performed by: HOSPITALIST

## 2020-07-27 RX ORDER — POTASSIUM CHLORIDE 20 MEQ/1
40 TABLET, EXTENDED RELEASE ORAL ONCE
Status: COMPLETED | OUTPATIENT
Start: 2020-07-27 | End: 2020-07-27

## 2020-07-27 RX ORDER — OMEPRAZOLE 20 MG/1
20 CAPSULE, DELAYED RELEASE ORAL DAILY
Status: DISCONTINUED | OUTPATIENT
Start: 2020-07-27 | End: 2020-08-07 | Stop reason: HOSPADM

## 2020-07-27 RX ORDER — NAPROXEN 500 MG/1
500 TABLET ORAL 2 TIMES DAILY WITH MEALS
Status: DISCONTINUED | OUTPATIENT
Start: 2020-07-27 | End: 2020-08-07 | Stop reason: HOSPADM

## 2020-07-27 RX ADMIN — OXYCODONE 5 MG: 5 TABLET ORAL at 00:23

## 2020-07-27 RX ADMIN — KETOROLAC TROMETHAMINE 30 MG: 30 INJECTION, SOLUTION INTRAMUSCULAR at 02:05

## 2020-07-27 RX ADMIN — ACETAMINOPHEN 1000 MG: 500 TABLET ORAL at 13:50

## 2020-07-27 RX ADMIN — RIFAMPIN 600 MG: 300 CAPSULE ORAL at 05:14

## 2020-07-27 RX ADMIN — NAPROXEN 500 MG: 500 TABLET ORAL at 09:34

## 2020-07-27 RX ADMIN — OXYCODONE 5 MG: 5 TABLET ORAL at 05:13

## 2020-07-27 RX ADMIN — POTASSIUM CHLORIDE 40 MEQ: 1500 TABLET, EXTENDED RELEASE ORAL at 23:33

## 2020-07-27 RX ADMIN — ACETAMINOPHEN 1000 MG: 500 TABLET ORAL at 17:33

## 2020-07-27 RX ADMIN — OXYCODONE 5 MG: 5 TABLET ORAL at 21:54

## 2020-07-27 RX ADMIN — DOXYCYCLINE 100 MG: 100 TABLET, FILM COATED ORAL at 05:14

## 2020-07-27 RX ADMIN — NAPROXEN 500 MG: 500 TABLET ORAL at 17:34

## 2020-07-27 RX ADMIN — OXYCODONE 5 MG: 5 TABLET ORAL at 16:32

## 2020-07-27 RX ADMIN — OMEPRAZOLE 20 MG: 20 CAPSULE, DELAYED RELEASE ORAL at 09:34

## 2020-07-27 RX ADMIN — DOXYCYCLINE 100 MG: 100 TABLET, FILM COATED ORAL at 17:33

## 2020-07-27 RX ADMIN — ACETAMINOPHEN 1000 MG: 500 TABLET ORAL at 05:15

## 2020-07-27 RX ADMIN — OXYCODONE 5 MG: 5 TABLET ORAL at 12:10

## 2020-07-27 ASSESSMENT — COGNITIVE AND FUNCTIONAL STATUS - GENERAL
PERSONAL GROOMING: A LOT
DRESSING REGULAR UPPER BODY CLOTHING: A LOT
CLIMB 3 TO 5 STEPS WITH RAILING: A LOT
MOBILITY SCORE: 12
DRESSING REGULAR LOWER BODY CLOTHING: A LITTLE
HELP NEEDED FOR BATHING: A LITTLE
SUGGESTED CMS G CODE MODIFIER MOBILITY: CL
MOVING FROM LYING ON BACK TO SITTING ON SIDE OF FLAT BED: UNABLE
SUGGESTED CMS G CODE MODIFIER DAILY ACTIVITY: CK
MOVING TO AND FROM BED TO CHAIR: UNABLE
TURNING FROM BACK TO SIDE WHILE IN FLAT BAD: A LOT
WALKING IN HOSPITAL ROOM: A LITTLE
STANDING UP FROM CHAIR USING ARMS: A LITTLE
EATING MEALS: A LOT
TOILETING: A LITTLE
DAILY ACTIVITIY SCORE: 15

## 2020-07-27 ASSESSMENT — ACTIVITIES OF DAILY LIVING (ADL): TOILETING: INDEPENDENT

## 2020-07-27 ASSESSMENT — ENCOUNTER SYMPTOMS
MYALGIAS: 1
BACK PAIN: 0
HALLUCINATIONS: 0
ABDOMINAL PAIN: 0
HEADACHES: 0
ORTHOPNEA: 0
BLURRED VISION: 0
PALPITATIONS: 0
CHILLS: 0
BRUISES/BLEEDS EASILY: 0
DOUBLE VISION: 0
WEIGHT LOSS: 0
SORE THROAT: 0
PHOTOPHOBIA: 0
VOMITING: 0
NECK PAIN: 0
HEMOPTYSIS: 0
WEAKNESS: 0
DIAPHORESIS: 0
SPEECH CHANGE: 0
SPUTUM PRODUCTION: 0
SINUS PAIN: 0
TREMORS: 0
HEARTBURN: 0
FEVER: 0
SHORTNESS OF BREATH: 0
NAUSEA: 0
COUGH: 0
DEPRESSION: 0
FOCAL WEAKNESS: 0

## 2020-07-27 ASSESSMENT — GAIT ASSESSMENTS
DEVIATION: ANTALGIC;INCREASED BASE OF SUPPORT;DECREASED HEEL STRIKE;DECREASED TOE OFF;OTHER (COMMENT)
DISTANCE (FEET): 100
GAIT LEVEL OF ASSIST: SUPERVISED

## 2020-07-27 ASSESSMENT — PAIN SCALES - WONG BAKER
WONGBAKER_NUMERICALRESPONSE: DOESN'T HURT AT ALL
WONGBAKER_NUMERICALRESPONSE: DOESN'T HURT AT ALL

## 2020-07-27 NOTE — CONSULTS
DATE OF SERVICE:  07/27/2020    ADDENDUM NOTE    INFECTIOUS DISEASE CONSULTATION    CHIEF COMPLAINT:  Bilateral hand and knee joint pain.      REASON FOR CONSULTATION:  Polyarthritis.      REFERRING PROVIDER:  Ruben Robert MD    HISTORY OF PRESENT ILLNESS:  Please see full consultation note by resident   physician, Dr. Ruby De Leon.  In summary, this is a 59-year-old    originally from [place] who presents to hospital with 3 days of bilateral hand   swelling, bilateral knee swelling, prompting admission and evaluation.  He did   not endorse or exhibit any fevers during his hospitalization, but presented   with a white count of 17,000 with 81% neutrophil, elevated inflammatory   markers with 59 sed rate and the CRP of 12.61.  Because of his polyarticular   joint swelling, septic arthritis was in the differential, prompting bilateral   diagnostic arthrocentesis in both knees.  His right knee revealed a white cell   count of 14,000 with 93% neutrophils and his left knee was also cloudy with   10,000 white cell count and 97% neutrophil.  So far, gram stains are showing   no organism.  There were a few monosodium urate crystals.  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  This has now alluded to the   possibility of brucellosis in his differential diagnosis.  This also led to   his empiric therapy of doxycycline and rifampin.  Ever since being on these   therapies, he is showing clinical improvement in his bilateral knee joints.    He still has some hand swelling in both his hands and wrist joints.  No fever   was documented during his entire stay.  In terms of his symptoms, patient   denied any constitutional symptoms the past year.  All his symptoms are acute.    Interestingly, he also notes that he has left ankle lesions that he thinks   is fungal, and he has been applying antifungal topical therapy  for the past 6   months.  Aside from this, patient has lived in [place] for the past 15 years.    He is originally from [place].  He denies any other international travels or   lived in other states.  He denies any TB exposure.  He denies any weight loss   or any other constitutional symptoms of fevers or chills.  He is sexually   active with a monogamous partner, is not .  Denies ever having any STD   symptoms or any screenings though.  Infectious disease consultation now   prompted for the evaluation of his polyarticular arthritis, possible septic   arthritis.      FAMILY HISTORY:  Please see my resident's note.      SOCIAL HISTORY:  Please see my resident's note.      ALLERGIES:  Please see my resident's note.      REVIEW OF SYSTEMS:  A 14-point review of system was seen and is negative   except for HPI.      PAST MEDICAL HISTORY:  History of osteoarthritis.      SURGICAL HISTORY:  Eye surgery.      MEDICATIONS:  Ibuprofen and Tylenol.      ALLERGIES:  None.      SOCIAL HISTORY:  He drinks 2-3 beers daily, occasionally smokes a single   cigarettes a day.  He works at a goat dairy ranch.  He is single, has lived in   [place] for 15 years, was born in [place].  No other international travel or   travels to other states in the past year.      FAMILY HISTORY:  No history of rheumatologic conditions.      PHYSICAL EXAMINATION:    VITAL SIGNS:  Temperature 36.5 Celsius, pulse 70, respirations 18, oxygen 95%   on room air, blood pressure 158/89.    GENERAL:  Patient is Yoruba speaking.  He is without acute distress.    HEENT:  Head is normocephalic, atraumatic.  Oral mucosa membranes moist.    Eyes:  PERRLA.  No scleral icterus or conjunctival injection.    CARDIOVASCULAR:  He has a II/VI apical systolic murmur.  Regular rate and   rhythm.    RESPIRATORY:  Lungs are clear to auscultation bilaterally.    GASTROINTESTINAL:  Abdomen is soft, nondistended, nontender.    MUSCULOSKELETAL:  He has bilateral PIP and  carpometacarpal joint swelling with   limited flexion and extension of his wrist.  There is tenderness to   palpation.  There is associated joint swelling evidently.  Both knees shows   mild effusion with pain with extension and flexion of his knee joints ____ to   palpation.    INTEGUMENTARY:  He has several isolated circumferential ranging from 3-5 cm   hypopigmented lesions of his distal left ankle and distal tibia with some   raise scaling, but no purulence or active bleed is noted.    PSYCHIATRIC:  He is alert and oriented x3.  He is Citizen of [place] speaking.    NEUROLOGIC:  No focal deficits noted.      LABORATORY DATA:  WBC 13,000, hemoglobin 11.9, platelet count 437, neutrophils   68%.  Sodium 137, potassium 3.5, chloride 101, creatinine is 0.72.  AST is   12, ALT is 18.  Fluid analyses of his right knee joint, synovial fluid shows a   WBC of 13,900; 8,000 RBCs; polys of 93%.  Left knee synovial fluid shows   10,790 WBC, RBC 3,000, 97% polys, few monosodium crystals.      HIV antigen antibody negative, rheumatoid factor negative.  RPR is   nonreactive.      IMAGING:  X-rays of his knee shows mild degenerative changes of his left knee.    No fractures or dislocation.      ASSESSMENT:    1.  Polyarticular arthritis.    2.  Leukocytosis due to #1.    3.  Chronic left ankle rash.      RECOMMENDATION:  This is a 59-year-old  male who is a  and   , presents with 3 days symptoms of arthritic pain in bilateral   wrists, hand and knees, prompting evaluation.  So far, diagnostic   arthrocentesis shows only minimally elevated white cell count, not consistent   with atypical bacterial septic arthritis of native joint.  What is interesting   is his arthritic symptoms is bilateral and polyarticular.  Differential   includes gonococcal septic arthritis, Lyme arthritis and in the setting of his   occupation exposure with zoonotic disease concerning for brucellosis.  What   is interesting is his  symptoms appears to be acute without any constitutional   symptoms that is classic of brucellosis.  What is also interesting is he has   these cutaneous lesions of his left ankle of unclear etiology.  Certainly   fungal infection, particularly dimorphic fungi should be considered given that   he is from [place] and differential includes coccidioidomycosis,   paracoccidioidomycosis as well as blastomyces.  Histoplasmosis is less likely.    Other differential includes autoimmune arthritis, although his rheumatoid   factor so far is negative.  Syphilis is also considered as well.  Patient   denies any insect bites or tick exposure, so Lyme arthritis is less likely,   particularly Lyme arthritis is usually of a subclinical and chronic form of   disease.    1.  Continue doxycycline and rifampin for empiric brucellosis, septic   arthritis treatment.    2.  Pending  Brucella antibodies.    3.  Pending coccidioidomycosis antibodies.    4.  Pending cultures.    5.  Ordered RPR.    6.  Ordered Lyme titers.    7.  Added blastomyces and histoplasmosis serology.    8.  Pending urine GC chlamydia NAAT.    9.  QuantiFERON pending.      Sixty-five minutes was spent with 50% face-to-face care.      Thank you for this most interesting consultation.  I will continue to follow   with you.      This exam and consultation was done in collaboration with resident physician,   Dr. Ruby De Leon.       ____________________________________     DO NING Mcmahan / MIC    DD:  07/27/2020 14:14:34  DT:  07/27/2020 15:01:08    D#:  [id number]  Job#:  [id number]

## 2020-07-27 NOTE — THERAPY
Physical Therapy   Initial Evaluation     Patient Name: Fede Miller  Age:  59 y.o., Sex:  male  Medical Record #: [id number]  Today's Date: 7/27/2020     Precautions: Fall Risk    Assessment  Patient is a 59 y.o. male admitted with c/o joint pain and swelling. Pt s/p B knee arthrocentesis with concern for gout. Pt presents to PT below his functional baseline d/t pain. Pt previously very active and independent, working on a farm. Pt requires significant assist for bed mobility and transfers. Pt requires elevated bed height for stand > sit d/t increased pain with knee flexion. Pt was however able to ambulate without assist, though with slow mariama and significant gait deviations noted below. Pt unable to tolerate knee flexion to 90 deg, not functional to negotiate stairs to access his apartment at this time. UE deficits, most noticeable in hands, also impacting functional mobility and independence at home. At this time recommend postacute placement to maximize safety and functional independence. Will follow.     Plan  Recommend Physical Therapy 4 times per week until therapy goals are met for the following treatments:  Bed Mobility, Gait Training, Neuro Re-Education / Balance, Self Care/Home Evaluation, Stair Training, Therapeutic Activities and Therapeutic Exercises  Discharge recommendations: Recommend post-acute placement for continued physical therapy services prior to discharge home.  Recommend PMR consult.        07/27/20 1009   Prior Living Situation   Prior Services None   Housing / Facility 1 Story Apartment / Condo   Steps Into Home FOS   Steps In Home 0   Elevator No   Equipment Owned None   Lives with - Patient's Self Care Capacity Alone and Able to Care For Self   Prior Level of Functional Mobility   Bed Mobility Independent   Transfer Status Independent   Ambulation Independent   Distance Ambulation (Feet) community distances   Assistive Devices Used None   Stairs Independent   Comments works on  farm, very physical job   Active ROM Lower Body    Comments L DF to neutral only, limited B knee flex d/t pain, full ext. edematous joints.    Strength Lower Body   Comments NT d/t pain, functional to ambulate, edematous joints   Gait Analysis   Gait Level Of Assist Supervised   Assistive Device None   Distance (Feet) 100   Deviation Antalgic;Increased Base Of Support;Decreased Heel Strike;Decreased Toe Off;Other (Comment)  (decr knee flexion d/t pain, short step lengths)   Weight Bearing Status no restrictions   Comments no LOB, limited by pain   Bed Mobility    Supine to Sit Moderate Assist   Sit to Supine Minimal Assist   Scooting Supervised   Functional Mobility   Sit to Stand Moderate Assist   Short Term Goals    Short Term Goal # 1 pt will perform supine <> sit without bed features with SPV in 6 visits to be able to get in/out of bed at home   Short Term Goal # 2 pt will perform all functional xfrs with SPV in 6 visits for improved independence   Short Term Goal # 3 pt will ambulate 150ft with SPV in 6 visits to access home   Short Term Goal # 4 pt will negotiate FOS with SPV in 6 visits to access home   Anticipated Discharge Equipment   DC Equipment Unable To Determine At This Time

## 2020-07-27 NOTE — PROGRESS NOTES
D/W Lab  Only have one order for crystals  Will coordinate with floor RN to make second order as 'collected' so we may obtain results on Bilat knee specimens obtained 9 hours ago

## 2020-07-27 NOTE — PROGRESS NOTES
"   Orthopaedic PA Progress Note    Interval changes:Aspiration results c/w gout. Recommend aggressive tx followed by ppx tx. Will follow Cx.    ROS - Patient denies any new issues. No chest pain, dyspnea, or fever.  Pain well controlled.    /89   Pulse 70   Temp 36.5 °C (97.7 °F) (Temporal)   Resp 18   Ht 1.727 m (5' 7.99\")   Wt 79.2 kg (174 lb 9.7 oz)   SpO2 95%     Patient seen and examined  No acute distress  Breathing non labored  RRR  Skin over knees is clean, dry, and intact. Patient clearly fires tibialis anterior, EHL, and gastrocnemius/soleus. Sensation is intact to light touch throughout superficial peroneal, deep peroneal, tibial, saphenous, and sural nerve distributions. Strong and palpable 2+ dorsalis pedis and posterior tibial pulses with capillary refill less than 2 seconds. No lower leg tenderness or discomfort.    Recent Labs     07/25/20  2104 07/27/20  0524   WBC 17.9* 13.7*   RBC 4.32* 4.16*   HEMOGLOBIN 12.6* 11.9*   HEMATOCRIT 38.0* 36.5*   MCV 88.0 87.7   MCH 29.2 28.6   MCHC 33.2* 32.6*   RDW 49.1 48.0   PLATELETCT 422 437   MPV 9.4 9.2       Active Hospital Problems    Diagnosis   • Polyarthralgia with peripheral swelling [M25.50]     Priority: High   • Hypokalemia [E87.6]       Assessment/Plan:  Acute gouty plolyarthralgia  Wt bearing status -as tolerated  PT/OT-initiated  Wound care:dressings off  Drains - no  Hwang-no  Sutures/Staples out- 10-14 days post operatively  Antibiotics: per Med/ID  DVT Prophylaxis- not required  Future Procedures - not indicated  Case Coordination for Discharge Planning - Disposition per Med  Recommend antiinflammatory tx +/- colchizine as tolerated with pain management, followed by lifelong care plan for prophylaxis    "

## 2020-07-27 NOTE — PROGRESS NOTES
Assessment completed. Pt A&Ox4.  Respirations even, unlabored on room air.  Pt reports pain.  POC discussed: pain control. Communication board updated.   Bed in locked, lowest position.  Call light and belongings within reach.  Needs met, will continue to monitor.

## 2020-07-27 NOTE — CONSULTS
DATE OF SERVICE:  07/26/2020    REQUESTING PHYSICIAN:  Dr. Starr.    CHIEF COMPLAINT:  Multiple joint pain.    HISTORY OF PRESENT ILLNESS:  The patient is a 59-year-old male with no past   medical history.  He works on a farm.  He has had pain in his knees and   ankles for about 4 days.  Admitted to the hospital early this morning.    Orthopedic consultation was requested.    ALLERGIES:  None.    MEDICATIONS:  Tylenol and ibuprofen occasionally.    PAST MEDICAL HISTORY:  None.    PAST SURGICAL HISTORY:  None.    SOCIAL HISTORY:  The patient works as a farmer.  Does not smoke or drink.    FAMILY HISTORY:  Negative.    REVIEW OF SYSTEMS:  No loss of consciousness, nausea, vomiting, diarrhea,   constipation, polyuria, dysuria, fevers, chills, weight loss, weight gain,   abdominal pain, chest pain, shortness of breath.    PHYSICAL EXAMINATION:  GENERAL:  The patient is in no acute distress, lying in his bed.  VITAL SIGNS:  Vital signs most recently include blood pressure 129/79.  Heart   rate not recorded at that time, earlier this morning, it was 68.  Respirations   16, temperature 97.9.  HEENT:  Normocephalic, atraumatic.  NECK:  Supple, nontender.  CHEST AND ABDOMEN:  Nontender.  No labored breathing.  EXTREMITIES:  Upper extremities without tenderness or deformity.  Lower   extremities show mild discomfort with range of motion of the ankles.  There is   a moderate-sized effusion of the right knee and large effusion on the left   knee.  Most of his pain is in the left knee.    LABS:  Include white blood cell count of ____, hematocrit 38.0%, platelet   count 422,000.  Sed rate 59.    Sodium 137, potassium 3.7, creatinine 0.75.    AST and ALT are normal.  Albumin is 3.7.    C-reactive protein 12.6.    No blood cultures done.    DIAGNOSTIC DATA:  Radiographs of the right knee show no fractures or osseous   lesions.  There are degenerative changes in the knee.    Radiographs of the right ankle show no osseous lesions or  fractures.    Radiographs of the left wrist demonstrate degenerative changes with a SLAC   wrist.    ASSESSMENT:  Bilateral knee and ankle pain.    PLAN:  Both knees were aspirated.  They both had some inflammatory fluid.  No   obvious pus.  These specimens were sent to the lab for Gram stain and culture   and cell count as well as crystals.       ____________________________________     MD ANNETTE WEBSTER / MIC    DD:  07/26/2020 13:06:10  DT:  07/26/2020 17:22:34    D#:  [id number]  Job#:  [id number]

## 2020-07-27 NOTE — THERAPY
"Occupational Therapy   Initial Evaluation     Patient Name: Fede Miller  Age:  59 y.o., Sex:  male  Medical Record #: [id number]  Today's Date: 7/27/2020     Precautions  Precautions: Fall Risk    Assessment  Patient is 59 y.o. male with a diagnosis of pain in hands, knees, and shoulder.  Additional factors influencing patient status / progress: s/p arthrocentesis, concern for gout. Patient presents with significant impairments in mobility and self care tasks related to pain. His hands are stiff and swollen and he is unable to use them functionally for feeding, grooming, etc. Patient reports pain in his knees whenever they are flexed which impacts his walking and transfers. Would recommend post-acute placement at this stage, as he lives alone. Independent PLOF.      Plan    Recommend Occupational Therapy 3 times per week until therapy goals are met for the following treatments:  Adaptive Equipment, Cognitive Skill Development, Manual Therapy Techniques, Self Care/Activities of Daily Living, Therapeutic Activities and Therapeutic Exercises.    Discharge recommendations:  Recommend post-acute placement for additional occupational therapy services prior to discharge home.      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

## 2020-07-27 NOTE — NON-PROVIDER
"Daily Progress Note:     Date of Service: 7/27/2020  Primary Team: [facility] Purple Team   Attending: Ruben Robert M.D.   Senior Resident: Dr. Starr  Intern: Dr. 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 No change in the past 24 hours. Pain is well managed with the exception of left wrist and hand especially the index finger which he can barely move. He also has trouble bending it. Swelling has decreased per patient. Patient reported no history of Raynaud phenomenon, no sick contacts with kids recently and no one is sick at home or exhibiting similar presentation and symptoms. No dysphagia or rash and no periocular rash. No recent increase in alcohol intake as well. No N/V/D, SOB, CP or headache.  Patient report that he wants to go home but was told that we are still working him up and that he needs to work with PT/OT for fall risk.           Consultants/Specialty:  Orthopedics     Objective Data:   Physical Exam:   Vitals:   Temp:  [36.6 °C (97.8 °F)-37 °C (98.6 °F)] 36.8 °C (98.2 °F)  Pulse:  [65-80] 72  Resp:  [16-18] 16  BP: (111-151)/(66-90) 111/80  SpO2:  [96 %-100 %] 97 %      Physical Exam  Vitals signs and nursing note reviewed.   Constitutional:       General: He is not in acute distress.     Appearance: Normal appearance. He is not ill-appearing or toxic-appearing.   HENT:      Head: Normocephalic and atraumatic.      Mouth/Throat:      Mouth: Mucous membranes are moist.   Eyes:      Pupils: Pupils are equal, round, and reactive to light.   Neck:      Musculoskeletal: No neck rigidity.   Cardiovascular:      Rate and Rhythm: Normal rate and regular rhythm.      Pulses: Normal pulses.      Heart sounds: No murmur. No friction rub. No gallop.  "   Pulmonary:      Effort: Pulmonary effort is normal.      Breath sounds: Normal breath sounds. No wheezing.   Abdominal:      General: Abdomen is flat. Bowel sounds are normal. There is no distension.      Palpations: Abdomen is soft. There is no mass.      Tenderness: There is no abdominal tenderness. There is no guarding.   Musculoskeletal:         General: Swelling and tenderness present. No deformity.      Left shoulder: He exhibits tenderness.      Right wrist: He exhibits tenderness.      Left wrist: He exhibits decreased range of motion, tenderness and swelling.      Right knee: Tenderness found.      Left knee: Tenderness found.      Right ankle: Tenderness.      Left ankle: Tenderness.      Right lower leg: No edema.      Left lower leg: No edema.   Skin:     General: Skin is warm.      Capillary Refill: Capillary refill takes less than 2 seconds.      Coloration: Skin is not jaundiced.      Findings: Lesion present. No bruising or rash.          Neurological:      General: No focal deficit present.      Mental Status: He is alert and oriented to person, place, and time. Mental status is at baseline.      Motor: No weakness.           Labs:   Results for orders placed or performed during the hospital encounter of 07/25/20   CBC WITH DIFFERENTIAL   Result Value Ref Range    WBC 17.9 (H) 4.8 - 10.8 K/uL    RBC 4.32 (L) 4.70 - 6.10 M/uL    Hemoglobin 12.6 (L) 14.0 - 18.0 g/dL    Hematocrit 38.0 (L) 42.0 - 52.0 %    MCV 88.0 81.4 - 97.8 fL    MCH 29.2 27.0 - 33.0 pg    MCHC 33.2 (L) 33.7 - 35.3 g/dL    RDW 49.1 35.9 - 50.0 fL    Platelet Count 422 164 - 446 K/uL    MPV 9.4 9.0 - 12.9 fL    Neutrophils-Polys 81.80 (H) 44.00 - 72.00 %    Lymphocytes 11.60 (L) 22.00 - 41.00 %    Monocytes 5.10 0.00 - 13.40 %    Eosinophils 0.50 0.00 - 6.90 %    Basophils 0.30 0.00 - 1.80 %    Immature Granulocytes 0.70 0.00 - 0.90 %    Nucleated RBC 0.00 /100 WBC    Neutrophils (Absolute) 14.66 (H) 1.82 - 7.42 K/uL    Lymphs  (Absolute) 2.07 1.00 - 4.80 K/uL    Monos (Absolute) 0.91 (H) 0.00 - 0.85 K/uL    Eos (Absolute) 0.09 0.00 - 0.51 K/uL    Baso (Absolute) 0.06 0.00 - 0.12 K/uL    Immature Granulocytes (abs) 0.12 (H) 0.00 - 0.11 K/uL    NRBC (Absolute) 0.00 K/uL   COMP METABOLIC PANEL   Result Value Ref Range    Sodium 137 135 - 145 mmol/L    Potassium 3.7 3.6 - 5.5 mmol/L    Chloride 102 96 - 112 mmol/L    Co2 21 20 - 33 mmol/L    Anion Gap 14.0 7.0 - 16.0    Glucose 101 (H) 65 - 99 mg/dL    Bun 16 8 - 22 mg/dL    Creatinine 0.75 0.50 - 1.40 mg/dL    Calcium 9.1 8.5 - 10.5 mg/dL    AST(SGOT) 16 12 - 45 U/L    ALT(SGPT) 18 2 - 50 U/L    Alkaline Phosphatase 98 30 - 99 U/L    Total Bilirubin 0.2 0.1 - 1.5 mg/dL    Albumin 3.7 3.2 - 4.9 g/dL    Total Protein 7.2 6.0 - 8.2 g/dL    Globulin 3.5 1.9 - 3.5 g/dL    A-G Ratio 1.1 g/dL   LIPASE   Result Value Ref Range    Lipase 19 11 - 82 U/L   URINALYSIS CULTURE, IF INDICATED    Specimen: Urine   Result Value Ref Range    Color Yellow     Character Clear     Specific Gravity 1.027 <1.035    Ph 6.0 5.0 - 8.0    Glucose Negative Negative mg/dL    Ketones Trace (A) Negative mg/dL    Protein Negative Negative mg/dL    Bilirubin Negative Negative    Urobilinogen, Urine 1.0 Negative    Nitrite Negative Negative    Leukocyte Esterase Negative Negative    Occult Blood Negative Negative    Micro Urine Req see below    CRP QUANTITIVE (NON-CARDIAC)   Result Value Ref Range    Stat C-Reactive Protein 12.61 (H) 0.00 - 0.75 mg/dL   ESTIMATED GFR   Result Value Ref Range    GFR If African American >60 >60 mL/min/1.73 m 2    GFR If Non African American >60 >60 mL/min/1.73 m 2   Sed Rate   Result Value Ref Range    Sed Rate Westergren 59 (H) 0 - 20 mm/hour   URIC ACID   Result Value Ref Range    Uric Acid 4.6 2.5 - 8.3 mg/dL   Chlamydia/GC PCR Urine Or Swab    Specimen: Urine, First Catch   Result Value Ref Range    Source Urine    HIV AG/AB COMBO ASSAY SCREENING   Result Value Ref Range    HIV Ag/Ab Combo  Assay Non-Reactive Non Reactive   FLUID CRYSTALS   Result Value Ref Range    Fluid Type Synovial     Crystals, Body Fluid Few None Seen   Fluid Cell Count   Result Value Ref Range    Fluid Type Synovial     Color-Body Fluid Yellow     Character-Body Fluid Cloudy     Total RBC Count 8000 cells/uL    Total WBC 15878 cells/uL    Polys 93 %    Lymphs 4 %    Mononuclear Cells - Fluid 3 %   Fluid Cell Count   Result Value Ref Range    Fluid Type Synovial     Color-Body Fluid Yellow     Character-Body Fluid Cloudy     Total RBC Count 3000 cells/uL    Total WBC 44313 cells/uL    Polys 97 %    Lymphs 2 %    Mononuclear Cells - Fluid 1 %   GRAM STAIN    Specimen: Synovial   Result Value Ref Range    Significant Indicator .     Source SYNO     Site Right Knee     Gram Stain Result Moderate WBCs.  No organisms seen.      GRAM STAIN    Specimen: Synovial   Result Value Ref Range    Significant Indicator .     Source SYNO     Site Left Knee     Gram Stain Result Moderate WBCs.  No organisms seen.      RHEUMATOID ARTHRITIS FACTOR   Result Value Ref Range    Rheumatoid Factor -Neph- <10 0 - 14 IU/mL   Comp Metabolic Panel (CMP)   Result Value Ref Range    Sodium 137 135 - 145 mmol/L    Potassium 3.5 (L) 3.6 - 5.5 mmol/L    Chloride 101 96 - 112 mmol/L    Co2 25 20 - 33 mmol/L    Anion Gap 11.0 7.0 - 16.0    Glucose 108 (H) 65 - 99 mg/dL    Bun 26 (H) 8 - 22 mg/dL    Creatinine 0.72 0.50 - 1.40 mg/dL    Calcium 8.6 8.5 - 10.5 mg/dL    AST(SGOT) 12 12 - 45 U/L    ALT(SGPT) 18 2 - 50 U/L    Alkaline Phosphatase 82 30 - 99 U/L    Total Bilirubin 0.4 0.1 - 1.5 mg/dL    Albumin 3.5 3.2 - 4.9 g/dL    Total Protein 6.4 6.0 - 8.2 g/dL    Globulin 2.9 1.9 - 3.5 g/dL    A-G Ratio 1.2 g/dL   CBC with Differential   Result Value Ref Range    WBC 13.7 (H) 4.8 - 10.8 K/uL    RBC 4.16 (L) 4.70 - 6.10 M/uL    Hemoglobin 11.9 (L) 14.0 - 18.0 g/dL    Hematocrit 36.5 (L) 42.0 - 52.0 %    MCV 87.7 81.4 - 97.8 fL    MCH 28.6 27.0 - 33.0 pg    MCHC 32.6  (L) 33.7 - 35.3 g/dL    RDW 48.0 35.9 - 50.0 fL    Platelet Count 437 164 - 446 K/uL    MPV 9.2 9.0 - 12.9 fL    Neutrophils-Polys 68.50 44.00 - 72.00 %    Lymphocytes 21.20 (L) 22.00 - 41.00 %    Monocytes 8.30 0.00 - 13.40 %    Eosinophils 0.50 0.00 - 6.90 %    Basophils 0.60 0.00 - 1.80 %    Immature Granulocytes 0.90 0.00 - 0.90 %    Nucleated RBC 0.00 /100 WBC    Neutrophils (Absolute) 9.36 (H) 1.82 - 7.42 K/uL    Lymphs (Absolute) 2.90 1.00 - 4.80 K/uL    Monos (Absolute) 1.14 (H) 0.00 - 0.85 K/uL    Eos (Absolute) 0.07 0.00 - 0.51 K/uL    Baso (Absolute) 0.08 0.00 - 0.12 K/uL    Immature Granulocytes (abs) 0.12 (H) 0.00 - 0.11 K/uL    NRBC (Absolute) 0.00 K/uL   FLUID CRYSTALS   Result Value Ref Range    Fluid Type Synovial     Crystals, Body Fluid Few None Seen   ESTIMATED GFR   Result Value Ref Range    GFR If African American >60 >60 mL/min/1.73 m 2    GFR If Non African American >60 >60 mL/min/1.73 m 2   EKG (NOW)   Result Value Ref Range    Report       [facility]    Test Date:  2020  Pt Name:    SHARON RICHMOND       Department: ER  MRN:        [id number]                      Room:        04  Gender:     Male                         Technician: [id number]  :        1960                   Requested By:NICOLA NORTH  Order #:    [id number]                    Reading MD: Nicola North    Measurements  Intervals                                Axis  Rate:       69                           P:          37  IL:         180                          QRS:        26  QRSD:       94                           T:          38  QT:         400  QTc:        429    Interpretive Statements  SINUS RHYTHM  CONSIDER LEFT VENTRICULAR HYPERTROPHY  BASELINE WANDER IN LEAD(S) I,III,aVR,aVL  No previous ECG available for comparison  Electronically Signed On 2020 0:35:03 PDT by Nicola North       Imaging:    Both knees were aspirated.  They both had some inflammatory fluid.  No    obvious pus.    Dx-Knee 2- LEFT   FINDINGS:  No focal soft tissue swelling or gross joint effusion.  Joint spaces are preserved.  Mild osteophyte formation at the articular margins.  Inferior patellar enthesophyte.  No fracture or dislocation.     IMPRESSION:     1.  Mild degenerative change of LEFT knee.  2.  No fracture or dislocation.      DK wrist      FINDINGS:  No acute fracture or dislocation. The alignment is anatomic. There are mild degenerative changes of the wrists, most pronounced involving the basal joint of the thumb and radiocarpal joint. There are scattered lucencies throughout the carpal bones   possibly degenerative/subchondral cysts. There is borderline widening of the scapholunate interval. There is periarticular soft tissue swelling.     IMPRESSION:     1.  Mild to moderate degenerative changes of the wrist and borderline widening of the scapholunate interval.  2.  No acute osseous abnormality.    DX ankle    FINDINGS:     There is no fracture or dislocation.  The visualized osseous structures are in anatomic alignment.  Ankle mortise is symmetric.  Mild degenerative changes of the mid and hindfoot.  Bone mineralization is age-appropriate..  13 mm plantar calcaneal spur.  Periarticular soft tissue swelling.     IMPRESSION:     1.  No acute osseous abnormality.  2.  Mild degenerative changes.  3.  Moderate plantar calcaneal spur.    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 Warm hot tender joings of the knee and ankles bilaterally. Patient's risk factor include working as a       Polyarthralgia with peripheral swelling  Assessment & Plan:    Initial lab:   -Crp-12.61 (elevated)  -Esr-elevated at 59  -Uric acid- 4.6  -knees were tapped yesterday with 78419 in right knee and 66741 in left knee with few crystals on each side.  Otherwise no organisms found in either. Yellow and cloudy in both indicating inflammatory process.   -non reactive HIV and <10 for RF  -still waiting on other workup labs at this point. Patient's hands showed david and Heberden's nodes consistent with chronic OA   -differential include: systemic RA on chronic OA, post viral reactive arthritis (parvo, Hep B and C), psoriatic arthritis polymyalgia rheumatic with elevated ESR, less likely spondyloarthropathy HLA B-27.   -patient's presentation of symmetrical joint tenderness and swelling and location make RA more likely than septic arthritis or polymyalgia rheumatica or IBD arthritis.   -Patient' joint tap does not meet the range for septic arthritis  But more inflammatory.   -could just be an undefined cause of polyarthralgia which has good prognosis.   -can consider doing a parvovirus serology and Hep B and C antigen, core antibody, and surface antibody.       PLAN:     Follow up with autoimmune work up: still pending CCP, ARTI, antistreptolysin O, Brucella abtibody and cocci antibody   PT/OT   Follow CBC and continue on current antibiotics and pain management         Hypokalemia  Assessment & Plan  K 3.5 today  CTM and replete and maintain >4

## 2020-07-27 NOTE — CONSULTS
"DATE OF SERVICE:  07/27/2020     INFECTIOUS DISEASE CONSULTATION    Of note, patient is [place]-speaking and a tele-interpretor was used for this encounter.      REASON FOR CONSULTATION:  Evaluation and antibiotic management of a 59-year old male with polyarticular pain.      HISTORY OF PRESENT ILLNESS:  The patient is a 59-year old male with no significant past medical history who presented to the hospital with pain and swelling in his left hand, right hand and right ankle about one week prior to presentation. It remained stable for about a couple of days and then about worsened about four days prior to admission. The pain and swelling in his hands and ankle worsened but also progressed to bilateral knees and left shoulder. The pain was characterized as burning and he was unable to walk or move his hand due to the pain. Patient took ibuprofen with no relief. Patient has not had similar symptoms in the past before except for vague arthritic pain in the past which would resolved with ibuprofen. Patient also reports having a \"fungal infection\" on left ankle about six months for which he used over the counter anti-fungal cream.     REVIEW OF SYSTEMS: Patient denies any fevers, chills, night sweats, weight loss, TB exposure, dysuria, previous history of sexually transmitted infections. All other review of systems were reviewed and negative.     SOCIAL HISTORY: Patient smokes 1 cigarette a day, drinks 3 beers per day and denies recreational drug use. He is sexually active with one woman. He lives by himself. He lives in [place], [place] for 15 years. He [place] up on [address] and has not traveled elsewhere. He denies any insect or tick bite. 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 He denies drinking raw milk.     On presentation patient was found to be afebrile and have leukocytosis of 17.9 with neutrophilic predominance. Uric " "acid level was 4.6. He was found to have elevated ESR of 59 and CRP 12.61. Chest X-ray was unremarkable. X-rays of bilateral wrists and knee were obtained which showed soft tissue swelling. Patient has had negative Rheumatoid factor and HIV screening. Urine Gonorrhea/Chlaymydia is pending as well as ARTI, Anti-streptolysin, CCP, Brucella Ab, Cocci Ab and Quantiferon. He underwent arthrocentesis of his bilateral knees on 7/26/20 with orthopedics. The synovial fluid was yellow. The right knee had 13,930WBCs with 93% polys  While the left had 10,790  with 97% polys. They both had few monosodium urate crystals. Cultures so far have been negative except for moderate WBCs. Patient received a dose of Solumedrol at the time of admission and was then transitioned to Rifampin and Doxycycline on 7/26. Patient reports that he is doing significantly better than on presentation. His swelling, pain and range of motion have improved.     CURRENT MEDICATIONS: Takes OTC Ibuprofen at home. At this time his medications include doxycycline, rifampin, omeprazole, Tylenol and naproxen.      ALLERGIES:  No known drug allergies.      PAST MEDICAL HISTORY:  No past medical history.     Past SURGICAL HISTORY: Left eye surgery \"years\" ago. Denies any hardware.     FAMILY History: Denies history of malignancy, inflammatory disease or heart disease.     PHYSICAL EXAMINATION:  VITAL SIGNS:  Currently, T 36.5C, P70, /89 and 95% SpO2 on room air.    HEENT:  Extraocular movements are intact.  No conjunctival erythema.  Oropharynx is clear.  NECK:  Supple. No cervical adenopathy.  HEART:  Regular rate and rhythm + 2 murmur at left parasternal border   LUNGS:  Clear to auscultation bilaterally.   ABDOMEN:  Soft.  Non-tender to palpation. No inguinal adenopathy.   EXTREMITIES: bilateral hands with diffuse MCP and PIP swelling. Tender to palpation. Warm to touch and decreased range of motion. Bilateral knees with swelling, warm to touch and has " "decreased range of flexion. Right ankle with joint tenderness. Left lower extremity with healing skin lesion from [facility] \"[facility]     LABORATORY DATA:  White count is 13.7, hemoglobin of 11.9, platelets of 437.  Sodium 137, potassium 3.5, chloride 101, CO2 of 25, BUN of   26, creatinine of 0.72 glucose of 108.  Blood cultures and synovial fluid cultures were   obtained and remain no growth to date.        IMAGING:  Reviewed chest x-ray, ankle, knee and wrist x-rays.      IMPRESSION:  1. Polyarthralgia with polyarticular involvement   2. Leukocytosis   3. Hypokalemia    4. Monosodium urate crystals present in synovial fluid     Patient is a 59-year old  male who presents with polyarthralgia with polyarticular joint involvement now status post arthrocentesis on 7/26. Synovial fluid analysis is concerning for arthritis of insidious etiology. Differentials include but are not limited to zoonotic processes such as Brucellosis, gonococcal infection, fungal infections with cutaneous lesions and autoimmune disease. Agree with empiric therapy with doxycycline and rifampin at this time. Awaiting final laboratory results. Will also go ahead and order RPR, Lyme, Histoplasmosis and Blastomycosis serologies as well.      Thank you for this interesting consult. Case discussed with Dr. Luz, Infectious disease attending.      ADDENDUM  Agree with above. This note was done with collaboration with Dr Mckenzie. Please refer to my note for further details    Dr. Luz     "

## 2020-07-27 NOTE — OP REPORT
DATE OF SERVICE:  07/26/2020    DIAGNOSES:  1.  Bilateral knee pain.  2.  Bilateral knee effusions.    PROCEDURES:  1.  Arthrocentesis, right knee.  2.  Arthrocentesis, left knee.    SURGEON:  1.  Hema Hancock MD  2.  Jozef Nye PA-C    INDICATIONS:  The patient is 59 years old.  He has atraumatic pain in both of   his knees with effusion.  He has elevated sed rate and CRP as well as white   blood cell count.  He was noted to have effusions in both knees.  We   recommended arthrocentesis.    DESCRIPTION OF PROCEDURE:  The lateral aspect of the right knee was prepped   with ChloraPrep and then 5 mL of 1% lidocaine was injected.  This procedure   was done by Jozef Nye.  18-gauge spinal needle was inserted into the   knee and about 10 mL of cloudy fluid was extracted.  Band-Aid was applied.    Fluid was sent to the lab for cell count, Gram stain, culture, and crystals.    While this was being done, I prepped the left knee, injected 3 mL of 1%   lidocaine.  I then inserted an 18-gauge spinal needle and then extracted   approximately 35 mL of cloudy fluid.  This was sent to the lab for cell count,   Gram stain and culture, and crystal evaluation.  Band-Aid was applied.    We will follow up on lab results.       ____________________________________     MD ANNETTE WEBSTER / MIC    DD:  07/26/2020 16:30:54  DT:  07/26/2020 18:11:25    D#:  [id number]  Job#:  [id number]

## 2020-07-27 NOTE — PROGRESS NOTES
Reports improved pain relief. Ambulating to room to bathroom sba. Kimberly diet, voiding. VSS, afebrile. RA sats >95%.

## 2020-07-28 ENCOUNTER — HOSPITAL ENCOUNTER (INPATIENT)
Facility: REHABILITATION | Age: 60
End: 2020-07-28
Attending: PHYSICAL MEDICINE & REHABILITATION | Admitting: PHYSICAL MEDICINE & REHABILITATION
Payer: COMMERCIAL

## 2020-07-28 PROBLEM — R74.8 ELEVATED LIVER ENZYMES: Status: ACTIVE | Noted: 2020-07-28

## 2020-07-28 LAB
ALBUMIN SERPL BCP-MCNC: 3.4 G/DL (ref 3.2–4.9)
ALBUMIN/GLOB SERPL: 1 G/DL
ALP SERPL-CCNC: 153 U/L (ref 30–99)
ALT SERPL-CCNC: 62 U/L (ref 2–50)
ANION GAP SERPL CALC-SCNC: 12 MMOL/L (ref 7–16)
ASO AB SERPL-ACNC: 66 IU/ML (ref 0–330)
AST SERPL-CCNC: 47 U/L (ref 12–45)
BASOPHILS # BLD AUTO: 0.5 % (ref 0–1.8)
BASOPHILS # BLD: 0.07 K/UL (ref 0–0.12)
BILIRUB SERPL-MCNC: 0.5 MG/DL (ref 0.1–1.5)
BUN SERPL-MCNC: 21 MG/DL (ref 8–22)
CALCIUM SERPL-MCNC: 8.9 MG/DL (ref 8.5–10.5)
CCP IGG SERPL-ACNC: 3 UNITS (ref 0–19)
CHLORIDE SERPL-SCNC: 103 MMOL/L (ref 96–112)
CO2 SERPL-SCNC: 22 MMOL/L (ref 20–33)
COVID ORDER STATUS COVID19: NORMAL
CREAT SERPL-MCNC: 0.61 MG/DL (ref 0.5–1.4)
EOSINOPHIL # BLD AUTO: 0.16 K/UL (ref 0–0.51)
EOSINOPHIL NFR BLD: 1.2 % (ref 0–6.9)
ERYTHROCYTE [DISTWIDTH] IN BLOOD BY AUTOMATED COUNT: 47.8 FL (ref 35.9–50)
GGT SERPL-CCNC: 169 U/L (ref 7–51)
GLOBULIN SER CALC-MCNC: 3.3 G/DL (ref 1.9–3.5)
GLUCOSE SERPL-MCNC: 102 MG/DL (ref 65–99)
HCT VFR BLD AUTO: 38.6 % (ref 42–52)
HGB BLD-MCNC: 12.6 G/DL (ref 14–18)
IMM GRANULOCYTES # BLD AUTO: 0.17 K/UL (ref 0–0.11)
IMM GRANULOCYTES NFR BLD AUTO: 1.3 % (ref 0–0.9)
LYMPHOCYTES # BLD AUTO: 1.62 K/UL (ref 1–4.8)
LYMPHOCYTES NFR BLD: 12.6 % (ref 22–41)
MCH RBC QN AUTO: 28.8 PG (ref 27–33)
MCHC RBC AUTO-ENTMCNC: 32.6 G/DL (ref 33.7–35.3)
MCV RBC AUTO: 88.3 FL (ref 81.4–97.8)
MONOCYTES # BLD AUTO: 1.12 K/UL (ref 0–0.85)
MONOCYTES NFR BLD AUTO: 8.7 % (ref 0–13.4)
NEUTROPHILS # BLD AUTO: 9.69 K/UL (ref 1.82–7.42)
NEUTROPHILS NFR BLD: 75.7 % (ref 44–72)
NRBC # BLD AUTO: 0 K/UL
NRBC BLD-RTO: 0 /100 WBC
NUCLEAR IGG SER QL IA: NORMAL
PLATELET # BLD AUTO: 462 K/UL (ref 164–446)
PMV BLD AUTO: 9 FL (ref 9–12.9)
POTASSIUM SERPL-SCNC: 3.9 MMOL/L (ref 3.6–5.5)
PROT SERPL-MCNC: 6.7 G/DL (ref 6–8.2)
RBC # BLD AUTO: 4.37 M/UL (ref 4.7–6.1)
SARS-COV-2 RNA RESP QL NAA+PROBE: NOTDETECTED
SODIUM SERPL-SCNC: 137 MMOL/L (ref 135–145)
SPECIMEN SOURCE: NORMAL
WBC # BLD AUTO: 12.8 K/UL (ref 4.8–10.8)

## 2020-07-28 PROCEDURE — G0378 HOSPITAL OBSERVATION PER HR: HCPCS

## 2020-07-28 PROCEDURE — U0003 INFECTIOUS AGENT DETECTION BY NUCLEIC ACID (DNA OR RNA); SEVERE ACUTE RESPIRATORY SYNDROME CORONAVIRUS 2 (SARS-COV-2) (CORONAVIRUS DISEASE [COVID-19]), AMPLIFIED PROBE TECHNIQUE, MAKING USE OF HIGH THROUGHPUT TECHNOLOGIES AS DESCRIBED BY CMS-2020-01-R: HCPCS

## 2020-07-28 PROCEDURE — 82977 ASSAY OF GGT: CPT

## 2020-07-28 PROCEDURE — 96372 THER/PROPH/DIAG INJ SC/IM: CPT

## 2020-07-28 PROCEDURE — 99245 OFF/OP CONSLTJ NEW/EST HI 55: CPT | Mod: 25 | Performed by: PHYSICAL MEDICINE & REHABILITATION

## 2020-07-28 PROCEDURE — 700111 HCHG RX REV CODE 636 W/ 250 OVERRIDE (IP): Performed by: STUDENT IN AN ORGANIZED HEALTH CARE EDUCATION/TRAINING PROGRAM

## 2020-07-28 PROCEDURE — 700101 HCHG RX REV CODE 250: Performed by: STUDENT IN AN ORGANIZED HEALTH CARE EDUCATION/TRAINING PROGRAM

## 2020-07-28 PROCEDURE — A9270 NON-COVERED ITEM OR SERVICE: HCPCS | Performed by: STUDENT IN AN ORGANIZED HEALTH CARE EDUCATION/TRAINING PROGRAM

## 2020-07-28 PROCEDURE — 80053 COMPREHEN METABOLIC PANEL: CPT

## 2020-07-28 PROCEDURE — 85025 COMPLETE CBC W/AUTO DIFF WBC: CPT

## 2020-07-28 PROCEDURE — 99226 PR SUBSEQUENT OBSERVATION CARE,LEVEL III: CPT | Mod: GC | Performed by: HOSPITALIST

## 2020-07-28 PROCEDURE — 700102 HCHG RX REV CODE 250 W/ 637 OVERRIDE(OP): Performed by: STUDENT IN AN ORGANIZED HEALTH CARE EDUCATION/TRAINING PROGRAM

## 2020-07-28 PROCEDURE — 99406 BEHAV CHNG SMOKING 3-10 MIN: CPT | Performed by: PHYSICAL MEDICINE & REHABILITATION

## 2020-07-28 RX ORDER — LIDOCAINE 50 MG/G
1 PATCH TOPICAL EVERY 24 HOURS
Status: DISCONTINUED | OUTPATIENT
Start: 2020-07-28 | End: 2020-08-07 | Stop reason: HOSPADM

## 2020-07-28 RX ADMIN — DOXYCYCLINE 100 MG: 100 TABLET, FILM COATED ORAL at 06:14

## 2020-07-28 RX ADMIN — ACETAMINOPHEN 1000 MG: 500 TABLET ORAL at 17:51

## 2020-07-28 RX ADMIN — OXYCODONE 5 MG: 5 TABLET ORAL at 04:35

## 2020-07-28 RX ADMIN — RIFAMPIN 600 MG: 300 CAPSULE ORAL at 06:14

## 2020-07-28 RX ADMIN — NAPROXEN 500 MG: 500 TABLET ORAL at 11:00

## 2020-07-28 RX ADMIN — DOXYCYCLINE 100 MG: 100 TABLET, FILM COATED ORAL at 17:51

## 2020-07-28 RX ADMIN — LIDOCAINE 1 PATCH: 50 PATCH CUTANEOUS at 11:01

## 2020-07-28 RX ADMIN — OXYCODONE 5 MG: 5 TABLET ORAL at 09:17

## 2020-07-28 RX ADMIN — OMEPRAZOLE 20 MG: 20 CAPSULE, DELAYED RELEASE ORAL at 06:14

## 2020-07-28 RX ADMIN — ACETAMINOPHEN 1000 MG: 500 TABLET ORAL at 11:00

## 2020-07-28 RX ADMIN — ENOXAPARIN SODIUM 40 MG: 40 INJECTION SUBCUTANEOUS at 17:51

## 2020-07-28 RX ADMIN — ACETAMINOPHEN 1000 MG: 500 TABLET ORAL at 06:14

## 2020-07-28 RX ADMIN — NAPROXEN 500 MG: 500 TABLET ORAL at 17:51

## 2020-07-28 RX ADMIN — OXYCODONE 5 MG: 5 TABLET ORAL at 16:02

## 2020-07-28 ASSESSMENT — ENCOUNTER SYMPTOMS
NECK PAIN: 0
SPUTUM PRODUCTION: 0
NAUSEA: 0
BRUISES/BLEEDS EASILY: 0
DEPRESSION: 0
BACK PAIN: 0
HEARTBURN: 0
PHOTOPHOBIA: 0
SPEECH CHANGE: 0
SINUS PAIN: 0
VOMITING: 0
FEVER: 0
CHILLS: 0
SORE THROAT: 0
DIARRHEA: 0
HEADACHES: 0
NERVOUS/ANXIOUS: 0
HEMOPTYSIS: 0
SHORTNESS OF BREATH: 0
COUGH: 0
MYALGIAS: 0
DOUBLE VISION: 0
DIAPHORESIS: 0
BLURRED VISION: 0
SENSORY CHANGE: 0
ABDOMINAL PAIN: 0
STRIDOR: 0
WEAKNESS: 1
FOCAL WEAKNESS: 0

## 2020-07-28 ASSESSMENT — PAIN SCALES - WONG BAKER: WONGBAKER_NUMERICALRESPONSE: DOESN'T HURT AT ALL

## 2020-07-28 NOTE — CONSULTS
Physical Medicine and Rehabilitation Consultation         Initial Consult      Date of initial consultation: 7/28/2020  Consulting provider: Tarik Starr MD  Reason for consultation: assess for acute inpatient rehab appropriateness  LOS: 0 Day(s)    Chief complaint: polyarticular arthritis     A  is used for the duration of my visit     HPI: The patient is a 59 y.o. right hand dominant male with no known past medical history;  who presented on 7/25/2020  8:46 PM with arthritic pain in bilateral wrists, hands and knees.  Patient presented to the hospital with elevated white count and is currently being followed by infectious disease.  ID has a working differential that includes gonococcal septic arthritis, Lyme arthritis brucellosis.  He is currently on doxycycline and rifampin which has been helping.  Patient is originally from [place] but has been living in [place] for 15 years.  He is a .      The patient currently reports swollen and painful hands as his chief complaint. He also endorses pain in his bilateral shoulders (L>R), elbows, knees, and ankles (R>L). He denies all other ROS.    ROS:  Pertinent positives are listed in HPI, all other systems reviewed and are negative    Social Hx:  Pre-morbidly, this patient lived in a single level home with 10-15 steps to enter (on the second floor), alone and able to care for self.   Employment:    Tobacco: 1-2 cigarettes a day   Alcohol: some beer on weekends   Drugs: denies     Current level of function:  The patient was evaluated by acute care Physical Therapy and Occupational Therapy; currently requiring moderate assistance for mobility and moderate assistance for ADLs    PMH:  Past Medical History:   Diagnosis Date   • Arthritis      PSH:  Past Surgical History:   Procedure Laterality Date   • EYE SURGERY       FHX:  Non-pertinent to today's issues    Medications:  Current Facility-Administered Medications   Medication Dose  "  • lidocaine (LIDODERM) 5 % 1 Patch  1 Patch   • naproxen (NAPROSYN) tablet 500 mg  500 mg   • omeprazole (PRILOSEC) capsule 20 mg  20 mg   • enalaprilat (VASOTEC) injection 1.25 mg  1.25 mg   • oxyCODONE immediate-release (ROXICODONE) tablet 5 mg  5 mg   • acetaminophen (TYLENOL) tablet 1,000 mg  1,000 mg   • doxycycline monohydrate (ADOXA) tablet 100 mg  100 mg   • riFAMPin (RIFADINE) capsule 600 mg  600 mg       Allergies:  No Known Allergies    Physical Exam:  Vitals: /73   Pulse 77   Temp 36.9 °C (98.4 °F) (Temporal)   Resp 16   Ht 1.727 m (5' 7.99\")   Wt 79.2 kg (174 lb 9.7 oz)   SpO2 98%   Gen: NAD  Head: NC/AT  Eyes/ Nose/ Mouth: PERRLA, moist mucous membranes  Cardio: RRR, no mumurs  Pulm: CTAB, with normal respiratory effort  Abd: Soft NTND, active bowel sounds,   Ext: moderately swollen hands, knees, and ankles     Mental status: answers questions appropriately follows commands  Speech: fluent, no aphasia or dysarthria    CRANIAL NERVES:  2,3: visual acuity grossly intact, PERRL  3,4,6: EOMI bilaterally, no nystagmus or diplopia  5: sensation intact to light touch bilaterally and symmetric  7: no facial asymmetry  8: hearing grossly intact  9,10: symmetric palate elevation  11: SCM/Trapezius strength 5/5 bilaterally  12: tongue protrudes midline    Motor:      Upper Extremity  Myotome R L   Shoulder flexion C5 4/5 4/5   Elbow flexion C5 4/5 4/5   Wrist extension C6 2/5 2/5   Elbow extension C7 4/5 4/5   Finger flexion C8 2/5 2/5   Finger abduction T1 2/5 2/5     Lower Extremity Myotome R L   Hip flexion L2 4/5 4/5   Knee extension L3 4/5 4/5   Ankle dorsiflexion L4 4//5 4/5   Toe extension L5 2/5 2/5   Ankle plantarflexion S1 4/5 4/5     Sensory:   intact to light touch through out    DTRs: 2+ in bilateral  brachioradialis, 2+ in bilateral patellar tendons  Negative babinski b/l  Negative Ni b/l     Labs: Reviewed and significant for   Recent Labs     07/25/20  2104 07/27/20  0524 " 07/28/20  0430   RBC 4.32* 4.16* 4.37*   HEMOGLOBIN 12.6* 11.9* 12.6*   HEMATOCRIT 38.0* 36.5* 38.6*   PLATELETCT 422 437 462*     Recent Labs     07/25/20  2104 07/27/20  0524 07/28/20  0430   SODIUM 137 137 137   POTASSIUM 3.7 3.5* 3.9   CHLORIDE 102 101 103   CO2 21 25 22   GLUCOSE 101* 108* 102*   BUN 16 26* 21   CREATININE 0.75 0.72 0.61   CALCIUM 9.1 8.6 8.9     Recent Results (from the past 24 hour(s))   CBC WITH DIFFERENTIAL    Collection Time: 07/28/20  4:30 AM   Result Value Ref Range    WBC 12.8 (H) 4.8 - 10.8 K/uL    RBC 4.37 (L) 4.70 - 6.10 M/uL    Hemoglobin 12.6 (L) 14.0 - 18.0 g/dL    Hematocrit 38.6 (L) 42.0 - 52.0 %    MCV 88.3 81.4 - 97.8 fL    MCH 28.8 27.0 - 33.0 pg    MCHC 32.6 (L) 33.7 - 35.3 g/dL    RDW 47.8 35.9 - 50.0 fL    Platelet Count 462 (H) 164 - 446 K/uL    MPV 9.0 9.0 - 12.9 fL    Neutrophils-Polys 75.70 (H) 44.00 - 72.00 %    Lymphocytes 12.60 (L) 22.00 - 41.00 %    Monocytes 8.70 0.00 - 13.40 %    Eosinophils 1.20 0.00 - 6.90 %    Basophils 0.50 0.00 - 1.80 %    Immature Granulocytes 1.30 (H) 0.00 - 0.90 %    Nucleated RBC 0.00 /100 WBC    Neutrophils (Absolute) 9.69 (H) 1.82 - 7.42 K/uL    Lymphs (Absolute) 1.62 1.00 - 4.80 K/uL    Monos (Absolute) 1.12 (H) 0.00 - 0.85 K/uL    Eos (Absolute) 0.16 0.00 - 0.51 K/uL    Baso (Absolute) 0.07 0.00 - 0.12 K/uL    Immature Granulocytes (abs) 0.17 (H) 0.00 - 0.11 K/uL    NRBC (Absolute) 0.00 K/uL   Comp Metabolic Panel    Collection Time: 07/28/20  4:30 AM   Result Value Ref Range    Sodium 137 135 - 145 mmol/L    Potassium 3.9 3.6 - 5.5 mmol/L    Chloride 103 96 - 112 mmol/L    Co2 22 20 - 33 mmol/L    Anion Gap 12.0 7.0 - 16.0    Glucose 102 (H) 65 - 99 mg/dL    Bun 21 8 - 22 mg/dL    Creatinine 0.61 0.50 - 1.40 mg/dL    Calcium 8.9 8.5 - 10.5 mg/dL    AST(SGOT) 47 (H) 12 - 45 U/L    ALT(SGPT) 62 (H) 2 - 50 U/L    Alkaline Phosphatase 153 (H) 30 - 99 U/L    Total Bilirubin 0.5 0.1 - 1.5 mg/dL    Albumin 3.4 3.2 - 4.9 g/dL    Total  Protein 6.7 6.0 - 8.2 g/dL    Globulin 3.3 1.9 - 3.5 g/dL    A-G Ratio 1.0 g/dL   ESTIMATED GFR    Collection Time: 07/28/20  4:30 AM   Result Value Ref Range    GFR If African American >60 >60 mL/min/1.73 m 2    GFR If Non African American >60 >60 mL/min/1.73 m 2   GAMMA GT (GGT)    Collection Time: 07/28/20  4:30 AM   Result Value Ref Range    Gamma Gt 169 (H) 7 - 51 U/L   COVID/SARS CoV-2 PCR    Collection Time: 07/28/20  8:18 AM    Specimen: Nasopharyngeal; Respirate   Result Value Ref Range    COVID Order Status PREAD-Recvd    SARS-CoV-2, PCR (In-House)    Collection Time: 07/28/20  8:18 AM   Result Value Ref Range    SARS-CoV-2 Source Nasal Swab        Imaging:   DX-KNEE 2- LEFT   Final Result      1.  Mild degenerative change of LEFT knee.   2.  No fracture or dislocation.      DX-KNEE 2- RIGHT   Final Result      1.  Mild to moderate osteoarthritis of the knee.   2.  Moderate joint effusion.   3.  No acute osseous abnormality.      DX-ANKLE 3+ VIEWS RIGHT   Final Result      1.  No acute osseous abnormality.   2.  Mild degenerative changes.   3.  Moderate plantar calcaneal spur.         DX-WRIST-COMPLETE 3+ LEFT   Final Result      1.  Mild to moderate degenerative changes of the wrist and borderline widening of the scapholunate interval.   2.  No acute osseous abnormality.      DX-CHEST-PORTABLE (1 VIEW)   Final Result      No acute cardiopulmonary abnormality.              ASSESSMENT:  Patient is a 59 y.o. male admitted with polyarticular arthritis now on abx and improving     Rehabilitation: Impaired ADLs and mobility  Patient has no support on DC and is expected to require a moderate amount of assistance, therefore is not a candidate for IPR. If swelling continues to improve on abx he will probably be able to functionally improve enough not to require rehab or assistance on DC.     Barriers to transfer include: Insurance authorization, TCCs to verify disposition, medical clearance and bed availability      [facility] Code / Diagnosis to Support: 0008.9 - Orthopaedic Disorders: Other Orthopaedic    Septic arthritis, unknown etiology, possibly brucellosis  -Doxycycline 100 mg twice daily until 8/2/2020  -Rifampin 600 mg daily until 8/2/2020  -Continue PT/OT while in house   - Patient will likely need SNF placement for continued ABX until his swelling improves and he can go up stairs unassisted.     Pain  -Roxicodone 5 mg every 4 hours as needed  -Tylenol 1000 mg 3 times daily  -Lidocaine patch daily  -Naproxen 500 mg twice daily with meals  - Lidocaine patch daily     Tobacco abuse   - Tobacco cessation counseling given for 5 minutes with emphasis on wound healing, vascular effects, and detriments to heart and lungs.     Bowel program  - Senokot 1 tab nightly  - MiraLAX 1 packet twice daily PRN  - Milk of magnesia 30mL daily if no bowel movement in greater than 24 hours  - Dulcolax suppository 10 mg if patient goes more than 48 hours without a bowel movement  - Fleet enema if patient goes more than 72 hours without a bowel movement    DVT Prophylaxis:   - SCDs    Discussed with pt, summarized hospitalization and care, options for next step of care  Labs reviewed    Discussed with team about recommendations     Thank you for allowing us to participate in the care of this patient.     Patient was seen for 112 minutes on unit/floor of which > 50% of time was spent on counseling and coordination of care regarding the above, including prognosis, risk reduction, benefits of treatment, and options for next stage of care.    Eyad Hernandez, DO   Physical Medicine and Rehabilitation

## 2020-07-28 NOTE — DISCHARGE PLANNING
Agency/Facility Name: [facility]  Spoke To: Deidre  Outcome: Patient accepted. Needs contact info for workers comp to get authorization.     Agency/Facility Name: [facility]  Spoke To: Reji  Outcome: Patient declined, due to cost of care. Reji reported that workers comp would only pay for 4 days worth of SNF.     Agency/Facility Name: [facility] [place] [place]   Outcome: Patient declined via Epic due to non contracted insurance.     Agency/Facility Name: [facility]   Outcome: Patient declined via Epic, does not accept workers comp cases.

## 2020-07-28 NOTE — ASSESSMENT & PLAN NOTE
- Normal liver enzymes on admission  - CMP 7/30 no significant changes, AST:64, ALT:162  - Liver enzymes back to Normal limits on 8/1  - CTM             PLAN:    - Establish with a PCP   - Follow with Rheum and ID  -

## 2020-07-28 NOTE — DISCHARGE PLANNING
[facility] Transitional Care Coordination     Referral from:  Tarik Starr MD  Facesheet indicates:  [id number]  Potential Rehab Diagnosis:  To be determined    Chart review indicates patient has on going medical management and therapy needs     D/C support:  To be determined - lives alone     Physiatry consultation forwarded per protocol.  TCC will follow.       Thank you for the referral.

## 2020-07-28 NOTE — PROGRESS NOTES
Infectious Disease Progress Note    Author: Killian Vaughan M.D. Date & Time created: 7/28/2020  2:46 PM    Interval History:  Dapto/rifmapin day #2    7/28: No acute issues. No fever. Joint pain a bit better but ROM still problematic.     Labs Reviewed, Medications Reviewed, Radiology Reviewed, Wound Reviewed, Fluids Reviewed and GI Nutrition Reviewed    Review of Systems:  Review of Systems   Constitutional: Negative for chills, fever and malaise/fatigue.   Respiratory: Negative for cough.    Cardiovascular: Positive for chest pain.   Gastrointestinal: Negative for abdominal pain, diarrhea, nausea and vomiting.   Genitourinary: Negative for dysuria.   Musculoskeletal: Positive for joint pain. Negative for back pain and myalgias.   Neurological: Negative for focal weakness.   Psychiatric/Behavioral: Negative for depression.       Physical Exam:  Physical Exam  Constitutional:       Appearance: Normal appearance.   Cardiovascular:      Rate and Rhythm: Normal rate and regular rhythm.      Heart sounds: No murmur.   Pulmonary:      Effort: Pulmonary effort is normal.      Breath sounds: Normal breath sounds.   Abdominal:      General: Abdomen is flat. Bowel sounds are normal.   Musculoskeletal:         General: Swelling (BL LE at the knees) present.   Skin:     General: Skin is warm and dry.      Findings: Rash present.   Neurological:      General: No focal deficit present.      Mental Status: He is alert.   Psychiatric:         Mood and Affect: Mood normal.         Labs:  Recent Results (from the past 24 hour(s))   CBC WITH DIFFERENTIAL    Collection Time: 07/28/20  4:30 AM   Result Value Ref Range    WBC 12.8 (H) 4.8 - 10.8 K/uL    RBC 4.37 (L) 4.70 - 6.10 M/uL    Hemoglobin 12.6 (L) 14.0 - 18.0 g/dL    Hematocrit 38.6 (L) 42.0 - 52.0 %    MCV 88.3 81.4 - 97.8 fL    MCH 28.8 27.0 - 33.0 pg    MCHC 32.6 (L) 33.7 - 35.3 g/dL    RDW 47.8 35.9 - 50.0 fL    Platelet Count 462 (H) 164 - 446 K/uL    MPV 9.0 9.0 - 12.9  fL    Neutrophils-Polys 75.70 (H) 44.00 - 72.00 %    Lymphocytes 12.60 (L) 22.00 - 41.00 %    Monocytes 8.70 0.00 - 13.40 %    Eosinophils 1.20 0.00 - 6.90 %    Basophils 0.50 0.00 - 1.80 %    Immature Granulocytes 1.30 (H) 0.00 - 0.90 %    Nucleated RBC 0.00 /100 WBC    Neutrophils (Absolute) 9.69 (H) 1.82 - 7.42 K/uL    Lymphs (Absolute) 1.62 1.00 - 4.80 K/uL    Monos (Absolute) 1.12 (H) 0.00 - 0.85 K/uL    Eos (Absolute) 0.16 0.00 - 0.51 K/uL    Baso (Absolute) 0.07 0.00 - 0.12 K/uL    Immature Granulocytes (abs) 0.17 (H) 0.00 - 0.11 K/uL    NRBC (Absolute) 0.00 K/uL   Comp Metabolic Panel    Collection Time: 07/28/20  4:30 AM   Result Value Ref Range    Sodium 137 135 - 145 mmol/L    Potassium 3.9 3.6 - 5.5 mmol/L    Chloride 103 96 - 112 mmol/L    Co2 22 20 - 33 mmol/L    Anion Gap 12.0 7.0 - 16.0    Glucose 102 (H) 65 - 99 mg/dL    Bun 21 8 - 22 mg/dL    Creatinine 0.61 0.50 - 1.40 mg/dL    Calcium 8.9 8.5 - 10.5 mg/dL    AST(SGOT) 47 (H) 12 - 45 U/L    ALT(SGPT) 62 (H) 2 - 50 U/L    Alkaline Phosphatase 153 (H) 30 - 99 U/L    Total Bilirubin 0.5 0.1 - 1.5 mg/dL    Albumin 3.4 3.2 - 4.9 g/dL    Total Protein 6.7 6.0 - 8.2 g/dL    Globulin 3.3 1.9 - 3.5 g/dL    A-G Ratio 1.0 g/dL   ESTIMATED GFR    Collection Time: 07/28/20  4:30 AM   Result Value Ref Range    GFR If African American >60 >60 mL/min/1.73 m 2    GFR If Non African American >60 >60 mL/min/1.73 m 2   GAMMA GT (GGT)    Collection Time: 07/28/20  4:30 AM   Result Value Ref Range    Gamma Gt 169 (H) 7 - 51 U/L   COVID/SARS CoV-2 PCR    Collection Time: 07/28/20  8:18 AM    Specimen: Nasopharyngeal; Respirate   Result Value Ref Range    COVID Order Status PREAD-Recvd    SARS-CoV-2, PCR (In-House)    Collection Time: 07/28/20  8:18 AM   Result Value Ref Range    SARS-CoV-2 Source Nasal Swab     SARS-CoV-2 by PCR NotDetected      Results     Procedure Component Value Units Date/Time    SARS-CoV-2, PCR (In-House) [[id number]] Collected:  07/28/20 0818     Order Status:  Completed Updated:  07/28/20 1359     SARS-CoV-2 Source Nasal Swab     SARS-CoV-2 by PCR NotDetected     Comment: [facility] providers: PLEASE REFER TO DE-ESCALATION AND RETESTING PROTOCOL  on [email or web address]  **The TaqPath COVID-19 SARS-CoV-2 test has been made available for use under  the Emergency Use Authorization (EUA) only.         Narrative:       Is patient being admitted?->No  Expected turn around time?->Routine (In-House PCR up to 24  hours)  RIGHT KNEE  LEFT KNEE  Specimen in lab    COVID/SARS CoV-2 PCR [[id number]] Collected:  07/28/20 0818    Order Status:  Completed Specimen:  Respirate from Nasopharyngeal Updated:  07/28/20 0830     COVID Order Status PREAD-Recvd     Comment: The order for SARS CoV-2 testing has been received by the  Laboratory. This result is neither positive nor negative.  Final results of testing will report in 24-48 hours, separately.         Narrative:       Is patient being admitted?->No  Expected turn around time?->Routine (In-House PCR up to 24  hours)  RIGHT KNEE  LEFT KNEE  Specimen in lab    FLUID CULTURE W/GRAM STAIN [[id number]] Collected:  07/26/20 1230    Order Status:  Completed Specimen:  Synovial Fluid Updated:  07/28/20 0827     Significant Indicator NEG     Source SYNO     Site Left Knee     Culture Result No growth at 48 hours.     Gram Stain Result Moderate WBCs.  No organisms seen.      Narrative:       Previous comment was modified by ALEX at 13:15 on 07/26/20.  LEFT  LEFT knee  LEFT    FLUID CULTURE W/GRAM STAIN [[id number]] Collected:  07/26/20 1230    Order Status:  Completed Specimen:  Synovial Fluid Updated:  07/28/20 0826     Significant Indicator NEG     Source SYNO     Site Right Knee     Culture Result No growth at 48 hours.     Gram Stain Result Moderate WBCs.  No organisms seen.      Narrative:       R knee  R knee    Chlamydia/GC PCR Urine Or Swab [[id number]] Collected:  07/25/20 2330    Order Status:  Completed Specimen:  Urine, First  "Catch Updated:  07/27/20 1658     C. trachomatis by PCR Negative     N. gonorrhoeae by PCR Negative     Source Urine    BLOOD CULTURE [[id number]] Collected:  07/26/20 0605    Order Status:  Completed Specimen:  Blood from Peripheral Updated:  07/27/20 0816     Significant Indicator NEG     Source BLD     Site PERIPHERAL     Culture Result No Growth  Note: Blood cultures are incubated for 5 days and  are monitored continuously.Positive blood cultures  are called to the RN and reported as soon as  they are identified.      Narrative:       Per Hospital Policy: Only change Specimen Src: to \"Line\" if  specified by physician order.  Left AC    BLOOD CULTURE [[id number]] Collected:  07/26/20 0608    Order Status:  Completed Specimen:  Blood from Peripheral Updated:  07/27/20 0816     Significant Indicator NEG     Source BLD     Site PERIPHERAL     Culture Result No Growth  Note: Blood cultures are incubated for 5 days and  are monitored continuously.Positive blood cultures  are called to the RN and reported as soon as  they are identified.      Narrative:       Per Hospital Policy: Only change Specimen Src: to \"Line\" if  specified by physician order.  No site indicated    GRAM STAIN [[id number]] Collected:  07/26/20 1230    Order Status:  Completed Specimen:  Synovial Updated:  07/26/20 1345     Significant Indicator .     Source SYNO     Site Left Knee     Gram Stain Result Moderate WBCs.  No organisms seen.      Narrative:       Previous comment was modified by ALEX at 13:15 on 07/26/20.  LEFT  LEFT knee  LEFT    GRAM STAIN [[id number]] Collected:  07/26/20 1230    Order Status:  Completed Specimen:  Synovial Updated:  07/26/20 1318     Significant Indicator .     Source SYNO     Site Right Knee     Gram Stain Result Moderate WBCs.  No organisms seen.      Narrative:       R knee  R knee    URINALYSIS CULTURE, IF INDICATED [[id number]]  (Abnormal) Collected:  07/25/20 2330    Order Status:  Completed Specimen:  Urine " Updated:  20     Color Yellow     Character Clear     Specific Gravity 1.027     Ph 6.0     Glucose Negative mg/dL      Ketones Trace mg/dL      Protein Negative mg/dL      Bilirubin Negative     Urobilinogen, Urine 1.0     Nitrite Negative     Leukocyte Esterase Negative     Occult Blood Negative     Micro Urine Req see below     Comment: Microscopic examination not performed when specimen is clear  and chemically negative for protein, blood, leukocyte esterase  and nitrite.             Hemodynamics:  Temp (24hrs), Av.9 °C (98.4 °F), Min:36.7 °C (98 °F), Max:37.1 °C (98.7 °F)  Temperature: 37.1 °C (98.7 °F)  Pulse  Av.5  Min: 62  Max: 80   Blood Pressure: (!) 168/87     Peripheral IV 20 18 G Left Forearm (Active)   Site Assessment Clean;Dry;Intact 20   Dressing Type Transparent;Occlusive 20   Line Status Saline locked 20   Dressing Status Clean;Dry;Intact 20   Dressing Intervention N/A 20   Infiltration Grading ([facility] 0 20   Phlebitis Scale ([facility] Only) 0 20     Wound:  @WOUNDLDA(4)@     Fluids:  Intake/Output       20 - 20 0659 20 - 20 0659 20 - 20 0659       Total  Total  Total       Intake    Total Intake -- -- -- -- -- -- -- -- --       Output    Urine  --  -- --  --  400 400  --  -- --    Number of Times Voided 3 x 1 x 4 x -- 1 x 1 x -- -- --    Urine Void (mL) -- -- -- -- 400 400 -- -- --    Stool  --  -- --  --  -- --  --  -- --    Number of Times Stooled -- -- -- -- 1 x 1 x -- -- --    Total Output -- -- -- -- 400 400 -- -- --       Net I/O     -- -- -- -- -400 -400 -- -- --           GI/Nutrition:  Orders Placed This Encounter   Procedures   • Diet Order Regular     Standing Status:   Standing     Number of Occurrences:   1     Order Specific Question:   Diet:     Answer:   Regular [1]      Medications:  Current Facility-Administered Medications   Medication Last Dose   • lidocaine (LIDODERM) 5 % 1 Patch 1 Patch at 07/28/20 1101   • naproxen (NAPROSYN) tablet 500 mg 500 mg at 07/28/20 1100   • omeprazole (PRILOSEC) capsule 20 mg 20 mg at 07/28/20 0614   • enalaprilat (VASOTEC) injection 1.25 mg     • oxyCODONE immediate-release (ROXICODONE) tablet 5 mg 5 mg at 07/28/20 0917   • acetaminophen (TYLENOL) tablet 1,000 mg 1,000 mg at 07/28/20 1100   • doxycycline monohydrate (ADOXA) tablet 100 mg 100 mg at 07/28/20 0614   • riFAMPin (RIFADINE) capsule 600 mg 600 mg at 07/28/20 0614     Medical Decision Making, by Problem:  Active Hospital Problems    Diagnosis   • Polyarthralgia with peripheral swelling [M25.50]   • Acute gout of knee [M10.9]   • Hypokalemia [E87.6]       Plan:  1.  Acute polyarticular arthritis.    2.  Leukocytosis due to #1.    3.  Chronic left ankle rash.       No acute issues overnight. Synovial fluid with monosodium urate crystals.  Query gout but curious as equal and BL  Pending Brucellosis and other titers as well as dimorphic fungi tests.  Feel if these are negative, should pursue gout measures.   Other differential includes autoimmune arthritis, although his rheumatoid factor so far is negative.   Syphilis neg by RPR.   Patient denies any insect bites or tick exposure  Continue doxycycline and rifampin for empiric brucellosis, septic arthritis treatment.    No changes today    35 minutes was spent with 50% face-to-face care.       Thank you for this most interesting consultation.  I will continue to follow   with you.  Interview and exam conducted in [place]an.     Dr Vaughan

## 2020-07-28 NOTE — DISCHARGE PLANNING
Received Choice form at 1357  Agency/Facility Name: 1. [facility], [facility] blanket  Referral sent per Choice form @ 1400

## 2020-07-28 NOTE — PROGRESS NOTES
Daily Progress Note:     Date of Service: 7/27/2020  Primary Team: [facility] Green Team   Attending: Yash Calderon M.D.   Senior Resident: Dr. Starr  Intern: Dr. Marinelli  Contact:  [phone]    Chief Complaint:   Polyarthritis   Gout    Subjective:   - No acute events overnight reported.  - Patient states improvement in pain and joint swelling.  - Alert and oriented times 4, denies any fever, chills, night sweats, SOB, chest pain, abdominal pain or urinary symptoms.  - Patient is now able to ambulate to restroom with assistance, was not able before due to severe pain.  - Arthrocentesis positive for urate chrystals (few).  - ID consulted today 7/27, recommended following pending results for Brucella, cocidio, urine GC chlamidia NAAT, Quantiferon, cultures,  also placed orders for Lyme titers, RPR, Blasto and Histoplasmosis  serology.    Consultants/Specialty:  Orthopaedic   ID    Review of Systems:      Review of Systems   Constitutional: Negative for chills, diaphoresis, fever, malaise/fatigue and weight loss.   HENT: Negative for congestion, ear discharge, nosebleeds, sinus pain, sore throat and tinnitus.    Eyes: Negative for blurred vision, double vision and photophobia.   Respiratory: Negative for cough, hemoptysis, sputum production and shortness of breath.    Cardiovascular: Negative for chest pain, palpitations, orthopnea and leg swelling.   Gastrointestinal: Negative for abdominal pain, heartburn, nausea and vomiting.   Genitourinary: Negative for dysuria, frequency, hematuria and urgency.   Musculoskeletal: Positive for joint pain and myalgias. Negative for back pain and neck pain.   Skin: Positive for rash. Negative for itching.   Neurological: Negative for tremors, speech change, focal weakness, weakness and headaches.   Endo/Heme/Allergies: Negative for environmental allergies. Does not bruise/bleed easily.   Psychiatric/Behavioral: Negative for depression, hallucinations and suicidal ideas.        Objective Data:   Physical Exam:   Vitals:   Temp:  [36.5 °C (97.7 °F)-37 °C (98.6 °F)] 36.7 °C (98 °F)  Pulse:  [62-80] 65  Resp:  [16-18] 18  BP: (111-158)/(66-90) 152/86  SpO2:  [95 %-100 %] 97 %    Physical Exam  Constitutional:       General: He is not in acute distress.     Appearance: Normal appearance. He is not toxic-appearing.   HENT:      Head: Normocephalic and atraumatic.      Nose: Nose normal. No congestion or rhinorrhea.      Mouth/Throat:      Mouth: Mucous membranes are moist.      Pharynx: No oropharyngeal exudate or posterior oropharyngeal erythema.   Eyes:      General: No scleral icterus.     Extraocular Movements: Extraocular movements intact.      Conjunctiva/sclera: Conjunctivae normal.      Pupils: Pupils are equal, round, and reactive to light.   Neck:      Musculoskeletal: Normal range of motion and neck supple. No neck rigidity or muscular tenderness.   Cardiovascular:      Rate and Rhythm: Normal rate and regular rhythm.      Pulses: Normal pulses.      Heart sounds: Normal heart sounds. No murmur.   Pulmonary:      Effort: Pulmonary effort is normal.      Breath sounds: Normal breath sounds. No wheezing or rhonchi.   Abdominal:      General: Bowel sounds are normal. There is no distension.      Palpations: Abdomen is soft.      Tenderness: There is no abdominal tenderness. There is no right CVA tenderness, left CVA tenderness or guarding.   Musculoskeletal:         General: Swelling, tenderness and deformity present.      Right lower leg: No edema.      Left lower leg: No edema.      Comments: - Moderate swelling of MCP joints bilaterally but not erythema.  - Interphalangeal joint swelling, more affected joints are middle finger bilaterally, they are painful to touch and with movement, patient unable to make a fist.  - Knees and ankles unremarkable, apparently back to baseline, but patient continues to have moderate pain on his feet and knee while ambulating or standing up, no  erythema noted.    Skin:     General: Skin is warm.      Capillary Refill: Capillary refill takes less than 2 seconds.      Coloration: Skin is not jaundiced or pale.      Findings: No bruising, erythema or rash.      Comments: Skin decoloration on left ankle, vitiligo unlikely.   Neurological:      General: No focal deficit present.      Mental Status: He is alert and oriented to person, place, and time. Mental status is at baseline.      Cranial Nerves: No cranial nerve deficit.   Psychiatric:         Mood and Affect: Mood normal.         Behavior: Behavior normal.         Thought Content: Thought content normal.           Labs:   Review    Imaging:   Review    Problem Representation:     Polyarthralgia with peripheral swelling  Assessment & Plan  - No previous Hx of Rheumatologic disease  - Denied family history of Rheumatoid conditions  - Patient stated that in the past he has had Joint swelling and pain, but he has been able to tolerate it and resolve with OTC NSAID's  - This is the first time it get very severe and first time that this many joints are involved  - Patient states improvement in pain and movement  - Arthrocentesis showed few urate crystals    - His job is extremely physical; he works on a farm milk unit (typical daily activity includes, but is not limited to, feeling 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    - VS at ED unremarkable  - Imaging knees showed mild degenerative disease  - Cbc- 17.9 wbc, hb 12.6  - Crp-12.61 (elevated)  - Esr-elevated at 59  - Uric acid- 4.6  - ID consulted 7/27                      PLAN:  - Follow up with autoimmune work up (I.e. 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  - CTM  - Encourage ambulation        Hypokalemia  Assessment & Plan  - K 3.7 on admission  - 7/27 3.5  - CTM and replete

## 2020-07-28 NOTE — PROGRESS NOTES
Daily Progress Note:     Date of Service: 7/28/2020  Primary Team: [facility] Green [facility]   Attending: Yash Calderon M.D.   Senior Resident: Dr. Starr  Intern: Dr. Marinelli  Contact:  [phone]    Chief Complaint:   Polyarthritis   Gout     Subjective:  No acute events overnight reported.  Patient stated that at 2 am he tried to get up to use the restroom and he felt weak, more than usual, this morning he does feel back to baseline, movement is limited due to joint pain.  Pain has been constant, at times severe, but fluctuates in intensity.  Swelling of his knees and feet resolved, but continues to present moderate/severe swelling of his wrists and all his fingers. Patient also has restricted motion of both elbows, but not pain at all.  Still pending labs, cultures NGTD.  Vital signs stable, afebrile., he denies fever, chills, night sweats.  Liver enzymes elevated today: AST:47, ALT:62, Alk Phosph:153, GGT:169, we will continue monitoring labs, likely secondary to Abx therapy (rifampin).    Consultants/Specialty:  ID    Review of Systems:     Review of Systems   Constitutional: Negative for chills, diaphoresis, fever and malaise/fatigue.   HENT: Negative for nosebleeds, sinus pain, sore throat and tinnitus.    Eyes: Negative for blurred vision, double vision and photophobia.   Respiratory: Negative for cough, hemoptysis, sputum production, shortness of breath and stridor.    Gastrointestinal: Negative for abdominal pain, heartburn, nausea and vomiting.   Genitourinary: Negative for dysuria, frequency, hematuria and urgency.   Musculoskeletal: Positive for joint pain. Negative for back pain, myalgias and neck pain.   Skin: Negative for itching and rash.   Neurological: Positive for weakness. Negative for sensory change, speech change, focal weakness and headaches.   Endo/Heme/Allergies: Negative for environmental allergies. Does not bruise/bleed easily.   Psychiatric/Behavioral: Negative for depression and  suicidal ideas. The patient is not nervous/anxious.        Objective Data:   Physical Exam:   Vitals:   Temp:  [36.7 °C (98 °F)-37.1 °C (98.7 °F)] 37.1 °C (98.7 °F)  Pulse:  [65-77] 66  Resp:  [16-19] 16  BP: (128-168)/(73-90) 168/87  SpO2:  [94 %-98 %] 96 %     Physical Exam  Constitutional:       General: He is not in acute distress.     Appearance: He is not diaphoretic.   HENT:      Head: Normocephalic and atraumatic.      Nose: Nose normal. No congestion or rhinorrhea.      Mouth/Throat:      Mouth: Mucous membranes are moist.      Pharynx: No oropharyngeal exudate or posterior oropharyngeal erythema.   Eyes:      General: No scleral icterus.     Extraocular Movements: Extraocular movements intact.      Conjunctiva/sclera: Conjunctivae normal.      Pupils: Pupils are equal, round, and reactive to light.   Neck:      Musculoskeletal: Normal range of motion and neck supple. No neck rigidity or muscular tenderness.   Cardiovascular:      Rate and Rhythm: Normal rate and regular rhythm.      Pulses: Normal pulses.      Heart sounds: Normal heart sounds. No murmur.   Pulmonary:      Effort: Pulmonary effort is normal. No respiratory distress.      Breath sounds: Normal breath sounds. No wheezing or rales.   Abdominal:      General: Bowel sounds are normal. There is no distension.      Palpations: Abdomen is soft.      Tenderness: There is no abdominal tenderness. There is no guarding or rebound.   Musculoskeletal:         General: Swelling and tenderness present.      Right lower leg: No edema.      Left lower leg: No edema.      Comments: Reduce active and passive ROM of elbows, wrists, all fingers, knees, ankles and big toes.  Mild swelling both elbows.  Moderate swelling both wrists and all his fingers.  Knees and feet no swelling noticed.  Warmth present in all painful joints, no erythema noticed.   Skin:     General: Skin is warm.      Capillary Refill: Capillary refill takes less than 2 seconds.       Coloration: Skin is not jaundiced.      Findings: No bruising or erythema.   Neurological:      General: No focal deficit present.      Mental Status: He is alert and oriented to person, place, and time. Mental status is at baseline.      Cranial Nerves: No cranial nerve deficit.   Psychiatric:         Mood and Affect: Mood normal.         Behavior: Behavior normal.         Thought Content: Thought content normal.           Labs:   Review    Imaging:   Review    Problem Representation:     Polyarthralgia with peripheral swelling  Assessment & Plan  - No previous Hx of Rheumatologic disease  - Denied family history of Rheumatoid conditions  - Patient stated that in the past he has had Joint swelling and pain, but he has been able to tolerate it and resolve with OTC NSAID's  - This is the first time it get very severe and first time that this many joints are involved  - Patient states improvement in pain and movement  - Arthrocentesis showed few urate crystals    - His job is extremely physical; he works on a farm milk unit (typical daily activity includes, but is not limited to, feeling 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    - VS at ED unremarkable  - Imaging knees showed mild degenerative disease  - Cbc- 17.9 wbc, hb 12.6  - Crp-12.61 (elevated)  - Esr-elevated at 59  - Uric acid- 4.6  - ID consulted 7/27  - Awaiting labs                      PLAN:  - Continue same management  - Cultures NGTD  - Follow up with autoimmune work up (I.e. 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  - CTM  - Encourage ambulation        Elevated liver enzymes  Assessment & Plan  - Normal liver enzymes on admission  - CMP 7/28: AST:47, ALT:62, ALK Phosp:153, GGT:169  - Likely secondary to Rifampin  - CTM             PLAN:  - Continue monitoring   - Daily CMP  - Consider  switching Abx alternative if significant worsening labs    Hypokalemia  Assessment & Plan  - Resolved  - K 3.7 on admission  - 7/27 3.5  - 7/28 3.9  - CTM and replete if needed

## 2020-07-28 NOTE — CARE PLAN
Problem: Communication  Goal: The ability to communicate needs accurately and effectively will improve  7/28/2020 0949 by Ute Marvin, Student  Outcome: PROGRESSING AS EXPECTED     Problem: Safety  Goal: Will remain free from injury  7/28/2020 0949 by Ute Marvin, Student  Outcome: PROGRESSING AS EXPECTED    Problem: Pain Management  Goal: Pain level will decrease to patient's comfort goal  7/28/2020 0949 by Ute Marvin, Student  Outcome: PROGRESSING AS EXPECTED     Problem: Mobility  Goal: Risk for activity intolerance will decrease  7/28/2020 0949 by Ute Marvin, Student  Outcome: PROGRESSING AS EXPECTED

## 2020-07-28 NOTE — PROGRESS NOTES
Assessment completed. A&O x4. Pt reports 9/10 left hand pain. Respirations even and unlabored on room air.  used to communicate with pt.   Updated on POC, communication board updated. Bed locked and in lowest position. Call light and belongings within reach. Non-skid socks in place. Needs met, will continue to monitor.

## 2020-07-28 NOTE — PROGRESS NOTES
Spoke to patient via   Both knees feel better, no fever  Decreased effusions  + DF/PF  Synovial fluid with monosodium urate crystals, no organisms  Recommend medical management  No surgery indicated

## 2020-07-28 NOTE — DISCHARGE PLANNING
Care Transition Team Assessment    Spoke with patient with [facility] [employer] . Patient lives alone in single story apartment. Has no PCP. Has a friend as support. Anticipate will need SNF placement for therapies.     Information Source  Information Given By: Patient  Informant's Name: Elly Grewal    Readmission Evaluation  Is this a readmission?: No    Interdisciplinary Discharge Planning  Does Admitting Nurse Feel This Could be a Complex Discharge?: No  Primary Care Physician: None  Lives with - Patient's Self Care Capacity: Alone and Unable to Care For Self  Patient or legal guardian wants to designate a caregiver (see row info): Yes  Caregiver name: Elly Grewal  Caregiver relationship to patient: Friend  Caregiver contact info: #[phone]  ([facility]) Authorization for Release of Health Information has been completed: Yes  Support Systems: Friends / Neighbors  Housing / Facility: 1 Story Apartment / Condo  Do You Take your Prescribed Medications Regularly: Yes  Able to Return to Previous ADL's: Future Time w/Therapy  Mobility Issues: Yes  Prior Services: None  Patient Expects to be Discharged to:: SNF  Assistance Needed: Yes  Durable Medical Equipment: Not Applicable    Discharge Preparedness  What are your discharge supports?: Other (comment)(Friend)  Prior Functional Level: Ambulatory    Functional Assesment  Prior Functional Level: Ambulatory    Finances  Prescription Coverage: (Patient dosent know)    Discharge Risks or Barriers  Patient risk factors: Language barrier    Anticipated Discharge Information  Anticipated discharge disposition: SNF  Discharge Contact Phone Number: [phone]

## 2020-07-28 NOTE — DISCHARGE PLANNING
Received request for SNF placement. Spoke with patient with assist of [facility] certified . Verbal permission received to send referrals to [facility] and blanket referral to local SNF> Choice form filled out with verbal consent. Choice form faxed to Arlyn CHILEL.

## 2020-07-28 NOTE — NON-PROVIDER
Daily Progress Note:     Date of Service: 7/28/2020  Primary Team: [facility]   Attending: Yash Calderon M.D.   Senior Resident: Dr. Starr  Intern: Dr. 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 Patient continuously to improve except the left hand. Patient still feel weakness on the left knee but has worked with PT/OT. Patient also complained about the abdominal pain in the umbilicus area after getting potassium yesterday but otherwise no N/V/D, SOB, Chest pain, fever, or chills.       Objective Data:   Physical Exam:   Vitals:   Temp:  [36.5 °C (97.7 °F)-37 °C (98.6 °F)] 37 °C (98.6 °F)  Pulse:  [62-73] 69  Resp:  [17-19] 17  BP: (128-166)/(78-90) 128/78  SpO2:  [94 %-98 %] 94 %     Physical Exam  Constitutional:       General: He is not in acute distress.     Appearance: Normal appearance.   HENT:      Head: Normocephalic and atraumatic.      Mouth/Throat:      Mouth: Mucous membranes are moist.   Eyes:      Pupils: Pupils are equal, round, and reactive to light.   Neck:      Musculoskeletal: Normal range of motion.   Cardiovascular:      Rate and Rhythm: Normal rate and regular rhythm.      Pulses: Normal pulses.      Heart sounds: Normal heart sounds.   Pulmonary:      Effort: Pulmonary effort is normal. No respiratory distress.      Breath sounds: Normal breath sounds.   Abdominal:      General: Abdomen is flat. Bowel sounds are normal. There is no distension.      Tenderness: There is abdominal tenderness.   Musculoskeletal:         General: No swelling.   Lymphadenopathy:      Cervical: No cervical adenopathy.   Skin:     General: Skin is warm.   Neurological:      General: No focal deficit present.      Mental Status: He is  alert and oriented to person, place, and time.           Labs:   Results for orders placed or performed during the hospital encounter of 07/25/20   CBC WITH DIFFERENTIAL   Result Value Ref Range    WBC 17.9 (H) 4.8 - 10.8 K/uL    RBC 4.32 (L) 4.70 - 6.10 M/uL    Hemoglobin 12.6 (L) 14.0 - 18.0 g/dL    Hematocrit 38.0 (L) 42.0 - 52.0 %    MCV 88.0 81.4 - 97.8 fL    MCH 29.2 27.0 - 33.0 pg    MCHC 33.2 (L) 33.7 - 35.3 g/dL    RDW 49.1 35.9 - 50.0 fL    Platelet Count 422 164 - 446 K/uL    MPV 9.4 9.0 - 12.9 fL    Neutrophils-Polys 81.80 (H) 44.00 - 72.00 %    Lymphocytes 11.60 (L) 22.00 - 41.00 %    Monocytes 5.10 0.00 - 13.40 %    Eosinophils 0.50 0.00 - 6.90 %    Basophils 0.30 0.00 - 1.80 %    Immature Granulocytes 0.70 0.00 - 0.90 %    Nucleated RBC 0.00 /100 WBC    Neutrophils (Absolute) 14.66 (H) 1.82 - 7.42 K/uL    Lymphs (Absolute) 2.07 1.00 - 4.80 K/uL    Monos (Absolute) 0.91 (H) 0.00 - 0.85 K/uL    Eos (Absolute) 0.09 0.00 - 0.51 K/uL    Baso (Absolute) 0.06 0.00 - 0.12 K/uL    Immature Granulocytes (abs) 0.12 (H) 0.00 - 0.11 K/uL    NRBC (Absolute) 0.00 K/uL   COMP METABOLIC PANEL   Result Value Ref Range    Sodium 137 135 - 145 mmol/L    Potassium 3.7 3.6 - 5.5 mmol/L    Chloride 102 96 - 112 mmol/L    Co2 21 20 - 33 mmol/L    Anion Gap 14.0 7.0 - 16.0    Glucose 101 (H) 65 - 99 mg/dL    Bun 16 8 - 22 mg/dL    Creatinine 0.75 0.50 - 1.40 mg/dL    Calcium 9.1 8.5 - 10.5 mg/dL    AST(SGOT) 16 12 - 45 U/L    ALT(SGPT) 18 2 - 50 U/L    Alkaline Phosphatase 98 30 - 99 U/L    Total Bilirubin 0.2 0.1 - 1.5 mg/dL    Albumin 3.7 3.2 - 4.9 g/dL    Total Protein 7.2 6.0 - 8.2 g/dL    Globulin 3.5 1.9 - 3.5 g/dL    A-G Ratio 1.1 g/dL   LIPASE   Result Value Ref Range    Lipase 19 11 - 82 U/L   URINALYSIS CULTURE, IF INDICATED    Specimen: Urine   Result Value Ref Range    Color Yellow     Character Clear     Specific Gravity 1.027 <1.035    Ph 6.0 5.0 - 8.0    Glucose Negative Negative mg/dL    Ketones Trace (A)  Negative mg/dL    Protein Negative Negative mg/dL    Bilirubin Negative Negative    Urobilinogen, Urine 1.0 Negative    Nitrite Negative Negative    Leukocyte Esterase Negative Negative    Occult Blood Negative Negative    Micro Urine Req see below    CRP QUANTITIVE (NON-CARDIAC)   Result Value Ref Range    Stat C-Reactive Protein 12.61 (H) 0.00 - 0.75 mg/dL   ESTIMATED GFR   Result Value Ref Range    GFR If African American >60 >60 mL/min/1.73 m 2    GFR If Non African American >60 >60 mL/min/1.73 m 2   Sed Rate   Result Value Ref Range    Sed Rate Westergren 59 (H) 0 - 20 mm/hour   URIC ACID   Result Value Ref Range    Uric Acid 4.6 2.5 - 8.3 mg/dL   Chlamydia/GC PCR Urine Or Swab    Specimen: Urine, First Catch   Result Value Ref Range    C. trachomatis by PCR Negative Negative    N. gonorrhoeae by PCR Negative Negative    Source Urine    ARTI REFLEXIVE PROFILE   Result Value Ref Range    Antinuclear Antibody None Detected None Detected   ANTISTREPTOLYSIN O   Result Value Ref Range    Antistreptolysin O Titer 66 0 - 330 IU/mL   COVID/SARS CoV-2 PCR    Specimen: Nasopharyngeal; Respirate   Result Value Ref Range    COVID Order Status PREAD-Recvd    CCP ANTIBODY   Result Value Ref Range    Ccp Antibodies 3 0 - 19 Units   BLOOD CULTURE    Specimen: Peripheral; Blood   Result Value Ref Range    Significant Indicator NEG     Source BLD     Site PERIPHERAL     Culture Result       No Growth  Note: Blood cultures are incubated for 5 days and  are monitored continuously.Positive blood cultures  are called to the RN and reported as soon as  they are identified.     BLOOD CULTURE    Specimen: Peripheral; Blood   Result Value Ref Range    Significant Indicator NEG     Source BLD     Site PERIPHERAL     Culture Result       No Growth  Note: Blood cultures are incubated for 5 days and  are monitored continuously.Positive blood cultures  are called to the RN and reported as soon as  they are identified.     HIV AG/AB COMBO  ASSAY SCREENING   Result Value Ref Range    HIV Ag/Ab Combo Assay Non-Reactive Non Reactive   FLUID CULTURE W/GRAM STAIN    Specimen: Synovial Fluid   Result Value Ref Range    Significant Indicator NEG     Source SYNO     Site Right Knee     Culture Result No growth at 48 hours.     Gram Stain Result Moderate WBCs.  No organisms seen.      FLUID CRYSTALS   Result Value Ref Range    Fluid Type Synovial     Crystals, Body Fluid Few None Seen   Fluid Cell Count   Result Value Ref Range    Fluid Type Synovial     Color-Body Fluid Yellow     Character-Body Fluid Cloudy     Total RBC Count 8000 cells/uL    Total WBC 75597 cells/uL    Polys 93 %    Lymphs 4 %    Mononuclear Cells - Fluid 3 %   FLUID CULTURE W/GRAM STAIN    Specimen: Synovial Fluid   Result Value Ref Range    Significant Indicator NEG     Source SYNO     Site Left Knee     Culture Result No growth at 48 hours.     Gram Stain Result Moderate WBCs.  No organisms seen.      Fluid Cell Count   Result Value Ref Range    Fluid Type Synovial     Color-Body Fluid Yellow     Character-Body Fluid Cloudy     Total RBC Count 3000 cells/uL    Total WBC 63886 cells/uL    Polys 97 %    Lymphs 2 %    Mononuclear Cells - Fluid 1 %   GRAM STAIN    Specimen: Synovial   Result Value Ref Range    Significant Indicator .     Source SYNO     Site Right Knee     Gram Stain Result Moderate WBCs.  No organisms seen.      GRAM STAIN    Specimen: Synovial   Result Value Ref Range    Significant Indicator .     Source SYNO     Site Left Knee     Gram Stain Result Moderate WBCs.  No organisms seen.      RHEUMATOID ARTHRITIS FACTOR   Result Value Ref Range    Rheumatoid Factor -Neph- <10 0 - 14 IU/mL   Comp Metabolic Panel (CMP)   Result Value Ref Range    Sodium 137 135 - 145 mmol/L    Potassium 3.5 (L) 3.6 - 5.5 mmol/L    Chloride 101 96 - 112 mmol/L    Co2 25 20 - 33 mmol/L    Anion Gap 11.0 7.0 - 16.0    Glucose 108 (H) 65 - 99 mg/dL    Bun 26 (H) 8 - 22 mg/dL    Creatinine 0.72  0.50 - 1.40 mg/dL    Calcium 8.6 8.5 - 10.5 mg/dL    AST(SGOT) 12 12 - 45 U/L    ALT(SGPT) 18 2 - 50 U/L    Alkaline Phosphatase 82 30 - 99 U/L    Total Bilirubin 0.4 0.1 - 1.5 mg/dL    Albumin 3.5 3.2 - 4.9 g/dL    Total Protein 6.4 6.0 - 8.2 g/dL    Globulin 2.9 1.9 - 3.5 g/dL    A-G Ratio 1.2 g/dL   CBC with Differential   Result Value Ref Range    WBC 13.7 (H) 4.8 - 10.8 K/uL    RBC 4.16 (L) 4.70 - 6.10 M/uL    Hemoglobin 11.9 (L) 14.0 - 18.0 g/dL    Hematocrit 36.5 (L) 42.0 - 52.0 %    MCV 87.7 81.4 - 97.8 fL    MCH 28.6 27.0 - 33.0 pg    MCHC 32.6 (L) 33.7 - 35.3 g/dL    RDW 48.0 35.9 - 50.0 fL    Platelet Count 437 164 - 446 K/uL    MPV 9.2 9.0 - 12.9 fL    Neutrophils-Polys 68.50 44.00 - 72.00 %    Lymphocytes 21.20 (L) 22.00 - 41.00 %    Monocytes 8.30 0.00 - 13.40 %    Eosinophils 0.50 0.00 - 6.90 %    Basophils 0.60 0.00 - 1.80 %    Immature Granulocytes 0.90 0.00 - 0.90 %    Nucleated RBC 0.00 /100 WBC    Neutrophils (Absolute) 9.36 (H) 1.82 - 7.42 K/uL    Lymphs (Absolute) 2.90 1.00 - 4.80 K/uL    Monos (Absolute) 1.14 (H) 0.00 - 0.85 K/uL    Eos (Absolute) 0.07 0.00 - 0.51 K/uL    Baso (Absolute) 0.08 0.00 - 0.12 K/uL    Immature Granulocytes (abs) 0.12 (H) 0.00 - 0.11 K/uL    NRBC (Absolute) 0.00 K/uL   FLUID CRYSTALS   Result Value Ref Range    Fluid Type Synovial     Crystals, Body Fluid Few None Seen   ESTIMATED GFR   Result Value Ref Range    GFR If African American >60 >60 mL/min/1.73 m 2    GFR If Non African American >60 >60 mL/min/1.73 m 2   T.PALLIDUM AB EIA   Result Value Ref Range    Syphilis, Treponemal Qual Non-Reactive Non-Reactive   CBC WITH DIFFERENTIAL   Result Value Ref Range    WBC 12.8 (H) 4.8 - 10.8 K/uL    RBC 4.37 (L) 4.70 - 6.10 M/uL    Hemoglobin 12.6 (L) 14.0 - 18.0 g/dL    Hematocrit 38.6 (L) 42.0 - 52.0 %    MCV 88.3 81.4 - 97.8 fL    MCH 28.8 27.0 - 33.0 pg    MCHC 32.6 (L) 33.7 - 35.3 g/dL    RDW 47.8 35.9 - 50.0 fL    Platelet Count 462 (H) 164 - 446 K/uL    MPV 9.0 9.0  - 12.9 fL    Neutrophils-Polys 75.70 (H) 44.00 - 72.00 %    Lymphocytes 12.60 (L) 22.00 - 41.00 %    Monocytes 8.70 0.00 - 13.40 %    Eosinophils 1.20 0.00 - 6.90 %    Basophils 0.50 0.00 - 1.80 %    Immature Granulocytes 1.30 (H) 0.00 - 0.90 %    Nucleated RBC 0.00 /100 WBC    Neutrophils (Absolute) 9.69 (H) 1.82 - 7.42 K/uL    Lymphs (Absolute) 1.62 1.00 - 4.80 K/uL    Monos (Absolute) 1.12 (H) 0.00 - 0.85 K/uL    Eos (Absolute) 0.16 0.00 - 0.51 K/uL    Baso (Absolute) 0.07 0.00 - 0.12 K/uL    Immature Granulocytes (abs) 0.17 (H) 0.00 - 0.11 K/uL    NRBC (Absolute) 0.00 K/uL   Comp Metabolic Panel   Result Value Ref Range    Sodium 137 135 - 145 mmol/L    Potassium 3.9 3.6 - 5.5 mmol/L    Chloride 103 96 - 112 mmol/L    Co2 22 20 - 33 mmol/L    Anion Gap 12.0 7.0 - 16.0    Glucose 102 (H) 65 - 99 mg/dL    Bun 21 8 - 22 mg/dL    Creatinine 0.61 0.50 - 1.40 mg/dL    Calcium 8.9 8.5 - 10.5 mg/dL    AST(SGOT) 47 (H) 12 - 45 U/L    ALT(SGPT) 62 (H) 2 - 50 U/L    Alkaline Phosphatase 153 (H) 30 - 99 U/L    Total Bilirubin 0.5 0.1 - 1.5 mg/dL    Albumin 3.4 3.2 - 4.9 g/dL    Total Protein 6.7 6.0 - 8.2 g/dL    Globulin 3.3 1.9 - 3.5 g/dL    A-G Ratio 1.0 g/dL   ESTIMATED GFR   Result Value Ref Range    GFR If African American >60 >60 mL/min/1.73 m 2    GFR If Non African American >60 >60 mL/min/1.73 m 2   SARS-CoV-2, PCR (In-House)   Result Value Ref Range    SARS-CoV-2 Source Nasal Swab     SARS-CoV-2 by PCR NotDetected    GAMMA GT (GGT)   Result Value Ref Range    Gamma Gt 169 (H) 7 - 51 U/L   EKG (NOW)   Result Value Ref Range    Report       [facility]    Test Date:  2020  Pt Name:    SHARON RICHMOND       Department: ER  MRN:        [id number]                      Room:       [facility]  Gender:     Male                         Technician: [id number]  :        1960                   Requested By:NICOLA LONDON  Order #:    [id number]                    Reading MD: Nicola  Can    Measurements  Intervals                                Axis  Rate:       69                           P:          37  VA:         180                          QRS:        26  QRSD:       94                           T:          38  QT:         400  QTc:        429    Interpretive Statements  SINUS RHYTHM  CONSIDER LEFT VENTRICULAR HYPERTROPHY  BASELINE WANDER IN LEAD(S) I,III,aVR,aVL  No previous ECG available for comparison  Electronically Signed On 7- 0:35:03 PDT by Junaid North         Problem Representation:   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 Warm hot tender joings of the knee and ankles bilaterally. Patient's risk factor include working as a     Polyarthralgia with peripheral swelling  Assessment & Plan:    Initial lab:   -Crp-12.61 (elevated)  -Esr-elevated at 59  -Uric acid- 4.6  -non reactive HIV and <10 for RF  -still waiting on other workup labs at this point. Also ordered lyme antibody, blastomyces ab, cocci ab and histoplasmosis ab  -differential include: systemic RA on chronic OA, post viral reactive arthritis (parvo, Hep B and C), psoriatic arthritis polymyalgia rheumatic with elevated ESR, less likely spondyloarthropathy HLA B-27.   -patient's presentation of symmetrical joint tenderness and swelling and location make RA more likely than septic arthritis or polymyalgia rheumatica or IBD arthritis.   -Patient' joint tap does not meet the range for septic arthritis  But more inflammatory.   -could just be an undefined cause of polyarthralgia which has good prognosis.   -can consider doing a parvovirus serology and Hep B and C antigen, core antibody, and surface antibody.   -patient had elevated ALT, AST, GGT and alk phos mainly due to rifampin along with orange/red urine        PLAN:      -Follow up with autoimmune  work up: still pending CCP, ARTI, antistreptolysin O, lyme ab, Brucella abtibody and cocci antibody and other fungal abs  -PT/OT   -Follow CBC, AST, ALT, alk phos, and GGT  -if LFT continues to be elevated then consider switching rifampin to TMP-SMX or even fluoroquinolone with doxycycline.            Hypokalemia  Assessment & Plan  K 3.9 today   Hypokalemia has resolved.

## 2020-07-28 NOTE — PROGRESS NOTES
Assessment completed. Pt A&Ox4.  Respirations even, unlabored on room air.  Pt reports pain.  POC discussed: placement. Communication board updated.   Bed in locked, lowest position.  Call light and belongings within reach.  Needs met, will continue to monitor.

## 2020-07-29 LAB
ALBUMIN SERPL BCP-MCNC: 3.4 G/DL (ref 3.2–4.9)
ALBUMIN/GLOB SERPL: 0.9 G/DL
ALP SERPL-CCNC: 162 U/L (ref 30–99)
ALT SERPL-CCNC: 64 U/L (ref 2–50)
ANION GAP SERPL CALC-SCNC: 12 MMOL/L (ref 7–16)
AST SERPL-CCNC: 31 U/L (ref 12–45)
B BURGDOR AB SER IA-ACNC: 0.46 LIV (ref 0–1.2)
BACTERIA FLD AEROBE CULT: NORMAL
BACTERIA FLD AEROBE CULT: NORMAL
BILIRUB SERPL-MCNC: 0.4 MG/DL (ref 0.1–1.5)
BRUCELLA AB TITR SER AGGL: NORMAL {TITER}
BUN SERPL-MCNC: 22 MG/DL (ref 8–22)
CALCIUM SERPL-MCNC: 9 MG/DL (ref 8.5–10.5)
CHLORIDE SERPL-SCNC: 101 MMOL/L (ref 96–112)
CO2 SERPL-SCNC: 22 MMOL/L (ref 20–33)
CREAT SERPL-MCNC: 0.62 MG/DL (ref 0.5–1.4)
GAMMA INTERFERON BACKGROUND BLD IA-ACNC: 0.02 IU/ML
GLOBULIN SER CALC-MCNC: 3.6 G/DL (ref 1.9–3.5)
GLUCOSE SERPL-MCNC: 117 MG/DL (ref 65–99)
GRAM STN SPEC: NORMAL
GRAM STN SPEC: NORMAL
M TB IFN-G BLD-IMP: POSITIVE
M TB IFN-G CD4+ BCKGRND COR BLD-ACNC: 0.96 IU/ML
MITOGEN IGNF BCKGRD COR BLD-ACNC: >10 IU/ML
POTASSIUM SERPL-SCNC: 3.8 MMOL/L (ref 3.6–5.5)
PROT SERPL-MCNC: 7 G/DL (ref 6–8.2)
QFT TB2 - NIL TBQ2: 0.69 IU/ML
SIGNIFICANT IND 70042: NORMAL
SIGNIFICANT IND 70042: NORMAL
SITE SITE: NORMAL
SITE SITE: NORMAL
SODIUM SERPL-SCNC: 135 MMOL/L (ref 135–145)
SOURCE SOURCE: NORMAL
SOURCE SOURCE: NORMAL

## 2020-07-29 PROCEDURE — 96372 THER/PROPH/DIAG INJ SC/IM: CPT

## 2020-07-29 PROCEDURE — 80053 COMPREHEN METABOLIC PANEL: CPT

## 2020-07-29 PROCEDURE — A9270 NON-COVERED ITEM OR SERVICE: HCPCS | Performed by: STUDENT IN AN ORGANIZED HEALTH CARE EDUCATION/TRAINING PROGRAM

## 2020-07-29 PROCEDURE — 700102 HCHG RX REV CODE 250 W/ 637 OVERRIDE(OP): Performed by: STUDENT IN AN ORGANIZED HEALTH CARE EDUCATION/TRAINING PROGRAM

## 2020-07-29 PROCEDURE — 700101 HCHG RX REV CODE 250: Performed by: STUDENT IN AN ORGANIZED HEALTH CARE EDUCATION/TRAINING PROGRAM

## 2020-07-29 PROCEDURE — 700111 HCHG RX REV CODE 636 W/ 250 OVERRIDE (IP): Performed by: STUDENT IN AN ORGANIZED HEALTH CARE EDUCATION/TRAINING PROGRAM

## 2020-07-29 PROCEDURE — G0378 HOSPITAL OBSERVATION PER HR: HCPCS

## 2020-07-29 PROCEDURE — 99226 PR SUBSEQUENT OBSERVATION CARE,LEVEL III: CPT | Mod: GC | Performed by: HOSPITALIST

## 2020-07-29 RX ADMIN — ACETAMINOPHEN 1000 MG: 500 TABLET ORAL at 17:25

## 2020-07-29 RX ADMIN — ENOXAPARIN SODIUM 40 MG: 40 INJECTION SUBCUTANEOUS at 05:21

## 2020-07-29 RX ADMIN — NAPROXEN 500 MG: 500 TABLET ORAL at 17:24

## 2020-07-29 RX ADMIN — OXYCODONE 5 MG: 5 TABLET ORAL at 05:21

## 2020-07-29 RX ADMIN — LIDOCAINE 1 PATCH: 50 PATCH CUTANEOUS at 10:29

## 2020-07-29 RX ADMIN — ACETAMINOPHEN 1000 MG: 500 TABLET ORAL at 13:38

## 2020-07-29 RX ADMIN — ACETAMINOPHEN 1000 MG: 500 TABLET ORAL at 05:21

## 2020-07-29 RX ADMIN — RIFAMPIN 600 MG: 300 CAPSULE ORAL at 05:21

## 2020-07-29 RX ADMIN — DOXYCYCLINE 100 MG: 100 TABLET, FILM COATED ORAL at 05:21

## 2020-07-29 RX ADMIN — NAPROXEN 500 MG: 500 TABLET ORAL at 06:38

## 2020-07-29 RX ADMIN — OXYCODONE 5 MG: 5 TABLET ORAL at 00:02

## 2020-07-29 RX ADMIN — DOXYCYCLINE 100 MG: 100 TABLET, FILM COATED ORAL at 17:24

## 2020-07-29 RX ADMIN — OMEPRAZOLE 20 MG: 20 CAPSULE, DELAYED RELEASE ORAL at 05:21

## 2020-07-29 ASSESSMENT — ENCOUNTER SYMPTOMS
DOUBLE VISION: 0
NAUSEA: 0
CHILLS: 0
VOMITING: 0
HEADACHES: 0
DIZZINESS: 0
COUGH: 0
SHORTNESS OF BREATH: 0
FEVER: 0
MYALGIAS: 0
BRUISES/BLEEDS EASILY: 0
BACK PAIN: 0
ABDOMINAL PAIN: 0
DIARRHEA: 0
DEPRESSION: 0
BLURRED VISION: 0
FOCAL WEAKNESS: 0

## 2020-07-29 ASSESSMENT — PAIN SCALES - WONG BAKER: WONGBAKER_NUMERICALRESPONSE: DOESN'T HURT AT ALL

## 2020-07-29 NOTE — CARE PLAN
Problem: Communication  Goal: The ability to communicate needs accurately and effectively will improve  Outcome: PROGRESSING AS EXPECTED     Problem: Safety  Goal: Will remain free from injury  Outcome: PROGRESSING AS EXPECTED     Problem: Pain Management  Goal: Pain level will decrease to patient's comfort goal  Outcome: PROGRESSING AS EXPECTED     Problem: Mobility  Goal: Risk for activity intolerance will decrease  Outcome: PROGRESSING AS EXPECTED

## 2020-07-29 NOTE — NON-PROVIDER
Daily Progress Note:     Date of Service: 7/29/2020  Primary Team: [facility]   Attending: Yash Calderon M.D.   Senior Resident: Dr. Starr   Intern: Dr. 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 Patient's left hand feels better with the lidocaine patch and rest of his joints continuously to improve as he was able to bear weight yesterday and take a shower and walk by himself with some assistance. He continuous to work with PT/OT. His pain is well controlled and stomach ache has resolved. He is eating and drinking fine but still has not had a BM. He has been passing flatus. Otherwise no N/V/D, SOB, CP, fever, or chills.       Objective Data:   Physical Exam:   Vitals:   Temp:  [36.7 °C (98.1 °F)-37.2 °C (99 °F)] 37.1 °C (98.8 °F)  Pulse:  [63-79] 79  Resp:  [16-18] 18  BP: (128-168)/(73-90) 143/84  SpO2:  [92 %-98 %] 95 %      Physical Exam  Vitals signs reviewed.   Constitutional:       Appearance: Normal appearance.   HENT:      Head: Normocephalic and atraumatic.      Mouth/Throat:      Mouth: Mucous membranes are moist.   Eyes:      Pupils: Pupils are equal, round, and reactive to light.   Cardiovascular:      Rate and Rhythm: Normal rate and regular rhythm.      Pulses: Normal pulses.   Pulmonary:      Effort: Pulmonary effort is normal.   Abdominal:      General: Abdomen is flat. There is no distension.      Palpations: Abdomen is soft.      Tenderness: There is no abdominal tenderness.   Musculoskeletal:         General: Tenderness present.      Right wrist: He exhibits no tenderness.      Left wrist: He exhibits tenderness.   Skin:     General: Skin is warm.   Neurological:      General: No focal deficit present.      Mental  Status: He is alert and oriented to person, place, and time.   Psychiatric:         Mood and Affect: Mood normal.           Labs:   Results for SHARON BRISCOE (MRN [id number]) as of 7/29/2020 06:33   Ref. Range 7/29/2020 05:31   Sodium Latest Ref Range: 135 - 145 mmol/L 135   Potassium Latest Ref Range: 3.6 - 5.5 mmol/L 3.8   Chloride Latest Ref Range: 96 - 112 mmol/L 101   Co2 Latest Ref Range: 20 - 33 mmol/L 22   Anion Gap Latest Ref Range: 7.0 - 16.0  12.0   Glucose Latest Ref Range: 65 - 99 mg/dL 117 (H)   Bun Latest Ref Range: 8 - 22 mg/dL 22   Creatinine Latest Ref Range: 0.50 - 1.40 mg/dL 0.62   GFR If  Latest Ref Range: >60 mL/min/1.73 m 2 >60   GFR If Non  Latest Ref Range: >60 mL/min/1.73 m 2 >60   Calcium Latest Ref Range: 8.5 - 10.5 mg/dL 9.0   AST(SGOT) Latest Ref Range: 12 - 45 U/L 31   ALT(SGPT) Latest Ref Range: 2 - 50 U/L 64 (H)   Alkaline Phosphatase Latest Ref Range: 30 - 99 U/L 162 (H)   Total Bilirubin Latest Ref Range: 0.1 - 1.5 mg/dL 0.4   Albumin Latest Ref Range: 3.2 - 4.9 g/dL 3.4   Total Protein Latest Ref Range: 6.0 - 8.2 g/dL 7.0   Globulin Latest Ref Range: 1.9 - 3.5 g/dL 3.6 (H)   A-G Ratio Latest Units: g/dL 0.9     Imaging: no new imaging     Problem Representation:  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 Warm hot tender joings of the knee and ankles bilaterally. Patient's risk factor include working as a      Polyarthralgia with peripheral swelling  Assessment & Plan:    Initial lab:   -Crp-12.61 (elevated)  -Esr-elevated at 59  -Uric acid- 4.6  -non reactive HIV and <10 for RF  -still waiting on other workup labs at this point.   -differential include: systemic RA on chronic OA, post viral reactive arthritis (parvo, Hep B and C), psoriatic arthritis polymyalgia rheumatic with elevated ESR, less likely  spondyloarthropathy HLA B-27.   -patient's presentation of symmetrical joint tenderness and swelling and location make RA more likely than septic arthritis or polymyalgia rheumatica or IBD arthritis.   -Patient' joint tap does not meet the range for septic arthritis  But more inflammatory.   -still waiting on Brucella antibody lab which if not out today should be out tomorrow  -ID consulted and will keep him for one more day   -patient had elevated ALT, AST, GGT and alk phos mainly due to rifampin along with orange/red urine. AST decreased but ALT remains high but stable. Alk phos elevated slightly more compared to yesterday. Continue to f/u with CMP and speak with ID for possible change in abx.         PLAN:      -Follow up with autoimmune work up: still pending CCP, ARTI, antistreptolysin O, lyme ab, Brucella abtibody and cocci antibody and other fungal abs  -PT/OT   -patient will be going to [facility]  -Follow CBC, AST, ALT, alk phos  -if LFT continues to be elevated then consider switching rifampin to TMP-SMX or even fluoroquinolone with doxycycline.            Hypokalemia  Assessment & Plan  K 3.9 today   Hypokalemia has resolved.

## 2020-07-29 NOTE — DISCHARGE PLANNING
Agency/Facility Name: [facility]  Spoke To: Deidre  Outcome: Will call phone number on patient facesheet for workers comp and update [employer].    Agency/Facility Name: [facility]  Spoke To: Priti  Outcome: Patient declined, does not accept works comp.

## 2020-07-29 NOTE — PROGRESS NOTES
Assumed pt care at 0700. Received report from Benito RHOADES. A&O x4. Pt denies pain. Respirations even and unlabored on room air.   Updated on POC, communication board updated. Bed locked and in lowest position. Call light and belongings within reach. Non-skid socks in place. Needs met, will continue to monitor.

## 2020-07-29 NOTE — CARE PLAN
Problem: Communication  Goal: The ability to communicate needs accurately and effectively will improve  Outcome: PROGRESSING AS EXPECTED     Problem: Safety  Goal: Will remain free from injury  Outcome: PROGRESSING AS EXPECTED  Goal: Will remain free from falls  Outcome: PROGRESSING AS EXPECTED     Problem: Pain Management  Goal: Pain level will decrease to patient's comfort goal  Outcome: PROGRESSING AS EXPECTED     Problem: Pain Management  Goal: Pain level will decrease to patient's comfort goal  Outcome: PROGRESSING AS EXPECTED     Problem: Infection  Goal: Will remain free from infection  Outcome: PROGRESSING AS EXPECTED     Problem: Venous Thromboembolism (VTW)/Deep Vein Thrombosis (DVT) Prevention:  Goal: Patient will participate in Venous Thrombosis (VTE)/Deep Vein Thrombosis (DVT)Prevention Measures  Outcome: PROGRESSING AS EXPECTED     Problem: Psychosocial Needs:  Goal: Level of anxiety will decrease  Outcome: PROGRESSING AS EXPECTED

## 2020-07-29 NOTE — PROGRESS NOTES
Infectious Disease Daily Progress Note    Date of Service  7/29/2020    Chief Complaint  Doxycycline 7/27-present  Rifampin 7/27-present     7/28: No acute issues. No fever. Joint pain a bit better but ROM still problematic.  7/29: Significant improvement with swelling and ROM with R hand and R knee    Review of Systems  Review of Systems   Musculoskeletal:        Bilateral hand and wrist swelling improving. R knee improved over L knee   All other systems reviewed and are negative.       Physical Exam  Temp:  [36.7 °C (98.1 °F)-37.2 °C (99 °F)] 37.1 °C (98.7 °F)  Pulse:  [63-79] 69  Resp:  [16-18] 17  BP: (134-168)/(80-90) 136/85  SpO2:  [92 %-98 %] 97 %    Physical Exam  Constitutional:       Appearance: Normal appearance.   HENT:      Head: Normocephalic and atraumatic.   Cardiovascular:      Rate and Rhythm: Normal rate and regular rhythm.      Pulses: Normal pulses.      Heart sounds: Murmur present.   Pulmonary:      Effort: Pulmonary effort is normal. No respiratory distress.      Breath sounds: No wheezing.   Abdominal:      General: Abdomen is flat. There is no distension.      Tenderness: There is no abdominal tenderness.   Musculoskeletal:      Comments: Bilateral hand and wrist swelling. Improved ROM with R wrist. L wrist still stiff. Similarly R knee with imporved flexion and extension. L knee limited with 70 degree flexion   Neurological:      General: No focal deficit present.      Mental Status: He is alert.   Psychiatric:         Mood and Affect: Mood normal.         Fluids    Intake/Output Summary (Last 24 hours) at 7/29/2020 1158  Last data filed at 7/29/2020 1100  Gross per 24 hour   Intake --   Output 650 ml   Net -650 ml       Laboratory  Recent Labs     07/27/20  0524 07/28/20  0430   WBC 13.7* 12.8*   RBC 4.16* 4.37*   HEMOGLOBIN 11.9* 12.6*   HEMATOCRIT 36.5* 38.6*   MCV 87.7 88.3   MCH 28.6 28.8   MCHC 32.6* 32.6*   RDW 48.0 47.8   PLATELETCT 437 462*   MPV 9.2 9.0     Recent Labs      07/27/20  0524 07/28/20  0430 07/29/20  0531   SODIUM 137 137 135   POTASSIUM 3.5* 3.9 3.8   CHLORIDE 101 103 101   CO2 25 22 22   GLUCOSE 108* 102* 117*   BUN 26* 21 22   CREATININE 0.72 0.61 0.62   CALCIUM 8.6 8.9 9.0                Plan:  1.  Acute polyarticular arthritis.    2.  Leukocytosis due to #1.    3.  Chronic left ankle rash.        Recommendation  - Agree with continuation of high dose naproxen for gout arthritis treatment  - Continue doxycycin and Rifampin for 6 week emperic course  - Still unsure if patient has brucellosis. Cultures are only 50-70% sensitive. Need to rely on serology testing. If negative will stop treatment. More concerned with gouty arthritis. Although atypical with symmetrical poly articular presentation. May even need rheumatology referral  - Syphilis neg by RPR.   - Primary team looking into SNF. Hold 1 more day, and plan for discharge tomorrow. We can followup in [facility] on Brucella serology.       35 minutes was spent with 50% face-to-face care.    Case was discussed with primary team. Evaluation in colloboration with resident physician    Dr Mckenzie    Thank you for this most interesting consultation.  I will continue to follow   with you.

## 2020-07-29 NOTE — PROGRESS NOTES
Daily Progress Note:     Date of Service: 7/29/2020  Primary Team: [facility] IM Green Team   Attending: Yash Calderon M.D.   Senior Resident: Dr. Starr  Intern: Dr. Marinelli  Contact:  [phone]    Chief Complaint:   Joint pain    Subjective:   No acute events overnight, symptoms overall improving although persistent in left wrist and left knee. Range of motion improving, able to stand minimally now. Discussed with Dr. Luz who will kindly follow-up outpatient if serologies are not back by tomorrow. No fevers or chills overnight, no other complaints.    Consultants/Specialty:  Infectious disease  Orthopedic surgery  Review of Systems:    Review of Systems   Constitutional: Negative for chills and fever.   HENT: Negative for ear pain and hearing loss.    Eyes: Negative for blurred vision and double vision.   Respiratory: Negative for cough and shortness of breath.    Cardiovascular: Negative for chest pain and leg swelling.   Gastrointestinal: Negative for nausea and vomiting.   Genitourinary: Negative for dysuria and frequency.   Musculoskeletal: Positive for joint pain. Negative for myalgias.   Skin: Negative for itching and rash.   Neurological: Negative for dizziness and headaches.   Endo/Heme/Allergies: Negative for environmental allergies. Does not bruise/bleed easily.   Psychiatric/Behavioral: Negative for depression and suicidal ideas.       Objective Data:   Physical Exam:   Vitals:   Temp:  [36.7 °C (98.1 °F)-37.2 °C (99 °F)] 36.9 °C (98.5 °F)  Pulse:  [63-79] 65  Resp:  [16-18] 16  BP: (134-153)/(80-90) 153/84  SpO2:  [90 %-98 %] 90 %    Physical Exam  Constitutional:       Appearance: Normal appearance.   HENT:      Head: Normocephalic and atraumatic.      Mouth/Throat:      Mouth: Mucous membranes are moist.   Eyes:      Extraocular Movements: Extraocular movements intact.      Pupils: Pupils are equal, round, and reactive to light.   Cardiovascular:      Rate and Rhythm: Normal rate and regular  rhythm.      Heart sounds: No murmur. No gallop.    Pulmonary:      Effort: Pulmonary effort is normal.      Breath sounds: Normal breath sounds. No wheezing.   Abdominal:      General: There is no distension.      Palpations: Abdomen is soft.      Tenderness: There is no abdominal tenderness.   Musculoskeletal:      Comments: Continuing warmth, tenderness, decreased ROM in left hand/wrist and left knee. Otherwise improving rom and tenderness in all other affected joints   Skin:     General: Skin is warm.      Findings: Rash (old rash with central clearing LLE) present.   Neurological:      Mental Status: He is alert and oriented to person, place, and time. Mental status is at baseline.   Psychiatric:         Mood and Affect: Mood normal.         Behavior: Behavior normal.           Labs:   Pending brucella, WBC improving  Imaging:   N/A  Problem Representation:     Polyarthralgia with peripheral swelling  Assessment & Plan  - No previous Hx of Rheumatologic disease  - Denied family history of Rheumatoid conditions  - Patient stated that in the past he has had Joint swelling and pain, but he has been able to tolerate it and resolve with OTC NSAID's  - This is the first time it get very severe and first time that this many joints are involved  - Patient states improvement in pain and movement  - Arthrocentesis showed few urate crystals    - His job is extremely physical; he works on a farm milk unit (typical daily activity includes, but is not limited to, feeling 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    - VS at ED unremarkable  - Imaging knees showed mild degenerative disease  - Cbc- 17.9 wbc, hb 12.6  - Crp-12.61 (elevated)  - Esr-elevated at 59  - Uric acid- 4.6  - ID consulted 7/27  - Awaiting labs                      PLAN:  - Continue same management  - Cultures NGTD  - Autoimmune workup negative  - Pain management-   - Rifampin and Doxy started to cover Brucellosis   - ID  ordered: Lyme titers, Blsto and Histoplasmosis serology, RPR negative  - CTM  - Encourage ambulation  - DC to skilled tomorrow if titers are not back with outpatient [facility] infectious disease follow-up with gout treatment        Elevated liver enzymes  Assessment & Plan  - Normal liver enzymes on admission  - CMP 7/28: AST:47, ALT:62, ALK Phosp:153, GGT:169  - Likely secondary to Rifampin  - CTM             PLAN:  - Continue monitoring   - Daily CMP  - Consider switching Abx alternative if significant worsening labs    Hypokalemia  Assessment & Plan  - Resolved  - K 3.7 on admission  - 7/27 3.5  - 7/28 3.9  - CTM and replete if needed

## 2020-07-29 NOTE — PROGRESS NOTES
Assessment completed. Pt A&Ox4.  Respirations even, unlabored on room air.  Pt reports/denies pain.   POC discussed: Placement possible SNIF. Communication board updated.   Bed in locked, lowest position.  Call light and belongings within reach.  Needs met, will continue to monitor.

## 2020-07-29 NOTE — PROGRESS NOTES
No growth at 72 hours  Aspirate consistent with gout  Non operative management  Ortho to sign off - call if concerns arrise

## 2020-07-29 NOTE — DISCHARGE PLANNING
TCC  Physiatry consult completed indicating pt will likely need SNF placement for continued ABX until his swelling improves and he can go up stairs unassisted.  PT lives alone in [place] w/ limited support.     Thank you for referral.

## 2020-07-29 NOTE — PROGRESS NOTES
Infectious Disease Progress Note    Author: Tatyanaibrahima DO Moises, PGY-3  Date & Time created: 7/29/2020  9:42 AM    Interval History:  Doxycycline/Rifampin Day 3    7/28: No acute issues. No fever. Joint pain a bit better but ROM still problematic.   7/29: Patient reports that he is feeling significantly better. There is improved range of motion of his hands, right knee and ankle.     Labs Reviewed, Medications Reviewed, Radiology Reviewed, Wound Reviewed, Fluids Reviewed and GI Nutrition Reviewed    Review of Systems:  Review of Systems   Constitutional: Negative for chills, fever and malaise/fatigue.   Respiratory: Negative for cough.    Cardiovascular: Negative for chest pain.   Gastrointestinal: Negative for abdominal pain, diarrhea, nausea and vomiting.   Genitourinary: Negative for dysuria.   Musculoskeletal: Positive for joint pain. Negative for back pain and myalgias.   Neurological: Negative for focal weakness.   Psychiatric/Behavioral: Negative for depression.       Physical Exam:  Physical Exam  Constitutional:       Appearance: Normal appearance. He is not toxic-appearing.   HENT:      Mouth/Throat:      Mouth: Mucous membranes are moist.   Eyes:      Extraocular Movements: Extraocular movements intact.   Cardiovascular:      Rate and Rhythm: Normal rate and regular rhythm.      Heart sounds: No murmur.   Pulmonary:      Effort: Pulmonary effort is normal.      Breath sounds: Normal breath sounds.   Abdominal:      General: Abdomen is flat. There is no distension.   Musculoskeletal:         General: Swelling present.      Comments: Improved range of motion right wrist, knee and ankle. Remains restricted on left wrist, knee and ankle. Warm and edematous bilaterally knees, improved from previous exam.    Skin:     General: Skin is warm and dry.      Findings: Rash (at left lower extremity) present.   Neurological:      General: No focal deficit present.      Mental Status: He is alert.   Psychiatric:          Mood and Affect: Mood normal.         Labs:  Recent Results (from the past 24 hour(s))   Comp Metabolic Panel    Collection Time: 07/29/20  5:31 AM   Result Value Ref Range    Sodium 135 135 - 145 mmol/L    Potassium 3.8 3.6 - 5.5 mmol/L    Chloride 101 96 - 112 mmol/L    Co2 22 20 - 33 mmol/L    Anion Gap 12.0 7.0 - 16.0    Glucose 117 (H) 65 - 99 mg/dL    Bun 22 8 - 22 mg/dL    Creatinine 0.62 0.50 - 1.40 mg/dL    Calcium 9.0 8.5 - 10.5 mg/dL    AST(SGOT) 31 12 - 45 U/L    ALT(SGPT) 64 (H) 2 - 50 U/L    Alkaline Phosphatase 162 (H) 30 - 99 U/L    Total Bilirubin 0.4 0.1 - 1.5 mg/dL    Albumin 3.4 3.2 - 4.9 g/dL    Total Protein 7.0 6.0 - 8.2 g/dL    Globulin 3.6 (H) 1.9 - 3.5 g/dL    A-G Ratio 0.9 g/dL   ESTIMATED GFR    Collection Time: 07/29/20  5:31 AM   Result Value Ref Range    GFR If African American >60 >60 mL/min/1.73 m 2    GFR If Non African American >60 >60 mL/min/1.73 m 2     Results     Procedure Component Value Units Date/Time    FLUID CULTURE W/GRAM STAIN [[id number]] Collected:  07/26/20 1230    Order Status:  Completed Specimen:  Synovial Fluid Updated:  07/29/20 0925     Significant Indicator NEG     Source SYNO     Site Left Knee     Culture Result No growth at 72 hours.     Gram Stain Result Moderate WBCs.  No organisms seen.      Narrative:       Previous comment was modified by ALEX at 13:15 on 07/26/20.  LEFT  LEFT knee  LEFT    FLUID CULTURE W/GRAM STAIN [[id number]] Collected:  07/26/20 1230    Order Status:  Completed Specimen:  Synovial Fluid Updated:  07/29/20 0925     Significant Indicator NEG     Source SYNO     Site Right Knee     Culture Result No growth at 72 hours.     Gram Stain Result Moderate WBCs.  No organisms seen.      Narrative:       R knee  R knee    SARS-CoV-2, PCR (In-House) [[id number]] Collected:  07/28/20 0818    Order Status:  Completed Updated:  07/28/20 1359     SARS-CoV-2 Source Nasal Swab     SARS-CoV-2 by PCR NotDetected     Comment: [facility] providers: PLEASE  "REFER TO DE-ESCALATION AND RETESTING PROTOCOL  on [email or web address]  **The TaqPath COVID-19 SARS-CoV-2 test has been made available for use under  the Emergency Use Authorization (EUA) only.         Narrative:       Is patient being admitted?->No  Expected turn around time?->Routine (In-House PCR up to 24  hours)  RIGHT KNEE  LEFT KNEE  Specimen in lab    COVID/SARS CoV-2 PCR [[id number]] Collected:  07/28/20 0818    Order Status:  Completed Specimen:  Respirate from Nasopharyngeal Updated:  07/28/20 0830     COVID Order Status PREAD-Recvd     Comment: The order for SARS CoV-2 testing has been received by the  Laboratory. This result is neither positive nor negative.  Final results of testing will report in 24-48 hours, separately.         Narrative:       Is patient being admitted?->No  Expected turn around time?->Routine (In-House PCR up to 24  hours)  RIGHT KNEE  LEFT KNEE  Specimen in lab    Chlamydia/GC PCR Urine Or Swab [[id number]] Collected:  07/25/20 2330    Order Status:  Completed Specimen:  Urine, First Catch Updated:  07/27/20 1658     C. trachomatis by PCR Negative     N. gonorrhoeae by PCR Negative     Source Urine    BLOOD CULTURE [[id number]] Collected:  07/26/20 0605    Order Status:  Completed Specimen:  Blood from Peripheral Updated:  07/27/20 0816     Significant Indicator NEG     Source BLD     Site PERIPHERAL     Culture Result No Growth  Note: Blood cultures are incubated for 5 days and  are monitored continuously.Positive blood cultures  are called to the RN and reported as soon as  they are identified.      Narrative:       Per Hospital Policy: Only change Specimen Src: to \"Line\" if  specified by physician order.  Left AC    BLOOD CULTURE [[id number]] Collected:  07/26/20 0608    Order Status:  Completed Specimen:  Blood from Peripheral Updated:  07/27/20 0816     Significant Indicator NEG     Source BLD     Site PERIPHERAL     Culture Result No Growth  Note: Blood cultures are incubated for 5 " "days and  are monitored continuously.Positive blood cultures  are called to the RN and reported as soon as  they are identified.      Narrative:       Per Hospital Policy: Only change Specimen Src: to \"Line\" if  specified by physician order.  No site indicated    GRAM STAIN [[id number]] Collected:  20 1230    Order Status:  Completed Specimen:  Synovial Updated:  20 1345     Significant Indicator .     Source SYNO     Site Left Knee     Gram Stain Result Moderate WBCs.  No organisms seen.      Narrative:       Previous comment was modified by ALEX at 13:15 on 20.  LEFT  LEFT knee  LEFT    GRAM STAIN [[id number]] Collected:  20 1230    Order Status:  Completed Specimen:  Synovial Updated:  20 1318     Significant Indicator .     Source SYNO     Site Right Knee     Gram Stain Result Moderate WBCs.  No organisms seen.      Narrative:       R knee  R knee    URINALYSIS CULTURE, IF INDICATED [[id number]]  (Abnormal) Collected:  20 2330    Order Status:  Completed Specimen:  Urine Updated:  20 0006     Color Yellow     Character Clear     Specific Gravity 1.027     Ph 6.0     Glucose Negative mg/dL      Ketones Trace mg/dL      Protein Negative mg/dL      Bilirubin Negative     Urobilinogen, Urine 1.0     Nitrite Negative     Leukocyte Esterase Negative     Occult Blood Negative     Micro Urine Req see below     Comment: Microscopic examination not performed when specimen is clear  and chemically negative for protein, blood, leukocyte esterase  and nitrite.             Hemodynamics:  Temp (24hrs), Av °C (98.6 °F), Min:36.7 °C (98.1 °F), Max:37.2 °C (99 °F)  Temperature: 37.1 °C (98.7 °F)  Pulse  Av.8  Min: 62  Max: 80   Blood Pressure: 136/85     Peripheral IV 20 18 G Left Forearm (Active)   Site Assessment Clean;Dry;Intact 20   Dressing Type Transparent;Occlusive 20   Line Status Saline locked 20   Dressing Status Clean;Dry;Intact " 07/29/20 0800   Dressing Intervention N/A 07/29/20 0800   Infiltration Grading ([facility], [facility]) 0 07/29/20 0800   Phlebitis Scale ([facility] Only) 0 07/29/20 0800     Wound:  @WOUNDLDA(4)@     Fluids:  Intake/Output       07/27/20 0700 - 07/28/20 0659 07/28/20 0700 - 07/29/20 0659 07/29/20 0700 - 07/30/20 0659      0700-1859 1900-0659 Total 0700-1859 1900-0659 Total 0700-1859 1900-0659 Total       Intake    Total Intake -- -- -- -- -- -- -- -- --       Output    Urine  --  400 400  --  350 350  --  -- --    Number of Times Voided -- 1 x 1 x -- 2 x 2 x -- -- --    Urine Void (mL) -- 400 400 -- 350 350 -- -- --    Stool  --  -- --  --  -- --  --  -- --    Number of Times Stooled -- 1 x 1 x -- 1 x 1 x 0 x -- 0 x    Total Output -- 400 400 -- 350 350 -- -- --       Net I/O     -- -400 -400 -- -350 -350 -- -- --           GI/Nutrition:  Orders Placed This Encounter   Procedures   • Diet Order Regular     Standing Status:   Standing     Number of Occurrences:   1     Order Specific Question:   Diet:     Answer:   Regular [1]     Medications:  Current Facility-Administered Medications   Medication Last Dose   • lidocaine (LIDODERM) 5 % 1 Patch 1 Patch at 07/28/20 1101   • enoxaparin (LOVENOX) inj 40 mg 40 mg at 07/29/20 0521   • naproxen (NAPROSYN) tablet 500 mg 500 mg at 07/29/20 0638   • omeprazole (PRILOSEC) capsule 20 mg 20 mg at 07/29/20 0521   • enalaprilat (VASOTEC) injection 1.25 mg     • oxyCODONE immediate-release (ROXICODONE) tablet 5 mg 5 mg at 07/29/20 0521   • acetaminophen (TYLENOL) tablet 1,000 mg 1,000 mg at 07/29/20 0521   • doxycycline monohydrate (ADOXA) tablet 100 mg 100 mg at 07/29/20 0521   • riFAMPin (RIFADINE) capsule 600 mg 600 mg at 07/29/20 0521     Medical Decision Making, by Problem:  Active Hospital Problems    Diagnosis   • Polyarthralgia with peripheral swelling [M25.50]   • Acute gout of knee [M10.9]   • Hypokalemia [E87.6]       Plan:  1. Acute polyarticular arthritis-improving  2.  Leukocytosis due to #1.    3. Chronic left ankle rash.    4. Elevated liver enzymes-stable     Patient continues to improve clinically. Patient has developed mild transaminitis which is likely due to Rifampin. Brucellosis serology and dimorphic fungi tests are still pending. Synovial cultures remain negative to date although they are only moderately sensitive.   - Continue to monitor for an additional day  - Although there were few MSU crystals in synovial fluid, but if cultures remain negative this may be an atypical presentation of gout versus other autoimmune arthritis  - Continue Doxycycline and Rifampin   - Agree with gout treatment with Naproxen   - Monitor liver enzymes   - Outpatient follow with Infectious disease for Brucella serology    Thank you for this interesting consultation.  We will continue to follow   with you.  Case discussed with Dr. Luz. Interview and exam conducted in Chinese via tele-.     ADDENDUM  Agree with the above. Continue NSAIDS and antimicrobials for now. Clinical improvement evidence. Ruling out brucellosis.   Dr. Luz

## 2020-07-30 ENCOUNTER — APPOINTMENT (OUTPATIENT)
Dept: RADIOLOGY | Facility: MEDICAL CENTER | Age: 60
End: 2020-07-30
Attending: STUDENT IN AN ORGANIZED HEALTH CARE EDUCATION/TRAINING PROGRAM
Payer: COMMERCIAL

## 2020-07-30 PROBLEM — Z22.7 LATENT TUBERCULOSIS: Status: ACTIVE | Noted: 2020-07-30

## 2020-07-30 PROBLEM — K59.00 CONSTIPATION: Status: ACTIVE | Noted: 2020-07-30

## 2020-07-30 LAB
H CAPSUL MYC AB TITR SER CF: NORMAL {TITER}
HISTO AB YEAST 9338: NORMAL

## 2020-07-30 PROCEDURE — 700102 HCHG RX REV CODE 250 W/ 637 OVERRIDE(OP): Performed by: STUDENT IN AN ORGANIZED HEALTH CARE EDUCATION/TRAINING PROGRAM

## 2020-07-30 PROCEDURE — 96372 THER/PROPH/DIAG INJ SC/IM: CPT

## 2020-07-30 PROCEDURE — A9270 NON-COVERED ITEM OR SERVICE: HCPCS | Performed by: FAMILY MEDICINE

## 2020-07-30 PROCEDURE — 97116 GAIT TRAINING THERAPY: CPT

## 2020-07-30 PROCEDURE — 700111 HCHG RX REV CODE 636 W/ 250 OVERRIDE (IP): Performed by: STUDENT IN AN ORGANIZED HEALTH CARE EDUCATION/TRAINING PROGRAM

## 2020-07-30 PROCEDURE — 74018 RADEX ABDOMEN 1 VIEW: CPT

## 2020-07-30 PROCEDURE — G0378 HOSPITAL OBSERVATION PER HR: HCPCS

## 2020-07-30 PROCEDURE — A9270 NON-COVERED ITEM OR SERVICE: HCPCS | Performed by: STUDENT IN AN ORGANIZED HEALTH CARE EDUCATION/TRAINING PROGRAM

## 2020-07-30 PROCEDURE — 99226 PR SUBSEQUENT OBSERVATION CARE,LEVEL III: CPT | Mod: GC | Performed by: HOSPITALIST

## 2020-07-30 PROCEDURE — 700102 HCHG RX REV CODE 250 W/ 637 OVERRIDE(OP): Performed by: FAMILY MEDICINE

## 2020-07-30 PROCEDURE — 700101 HCHG RX REV CODE 250: Performed by: STUDENT IN AN ORGANIZED HEALTH CARE EDUCATION/TRAINING PROGRAM

## 2020-07-30 PROCEDURE — 97530 THERAPEUTIC ACTIVITIES: CPT

## 2020-07-30 PROCEDURE — 97110 THERAPEUTIC EXERCISES: CPT

## 2020-07-30 RX ORDER — RIFAMPIN 300 MG/1
600 CAPSULE ORAL DAILY
Qty: 60 CAP | Refills: 0
Start: 2020-07-30 | End: 2020-08-29

## 2020-07-30 RX ORDER — ACETAMINOPHEN 500 MG
1000 TABLET ORAL 3 TIMES DAILY
Qty: 30 TAB | Refills: 0 | Status: SHIPPED | OUTPATIENT
Start: 2020-07-30 | End: 2020-10-01

## 2020-07-30 RX ORDER — BISACODYL 10 MG
10 SUPPOSITORY, RECTAL RECTAL DAILY
Status: DISCONTINUED | OUTPATIENT
Start: 2020-07-30 | End: 2020-08-07 | Stop reason: HOSPADM

## 2020-07-30 RX ORDER — POLYETHYLENE GLYCOL 3350 17 G/17G
1 POWDER, FOR SOLUTION ORAL DAILY
Status: DISCONTINUED | OUTPATIENT
Start: 2020-07-30 | End: 2020-08-07 | Stop reason: HOSPADM

## 2020-07-30 RX ORDER — NAPROXEN 500 MG/1
500 TABLET ORAL 2 TIMES DAILY WITH MEALS
Qty: 28 TAB | Refills: 0 | Status: SHIPPED | OUTPATIENT
Start: 2020-07-30 | End: 2020-08-21

## 2020-07-30 RX ORDER — POLYETHYLENE GLYCOL 3350 17 G/17G
17 POWDER, FOR SOLUTION ORAL DAILY
Qty: 30 EACH | Refills: 0
Start: 2020-07-30 | End: 2020-10-01

## 2020-07-30 RX ORDER — OMEPRAZOLE 20 MG/1
20 CAPSULE, DELAYED RELEASE ORAL DAILY
Qty: 30 CAP | Refills: 0
Start: 2020-07-31 | End: 2020-10-01

## 2020-07-30 RX ORDER — LIDOCAINE 50 MG/G
1 PATCH TOPICAL EVERY 24 HOURS
Qty: 10 PATCH | Refills: 0
Start: 2020-07-31 | End: 2020-10-01

## 2020-07-30 RX ADMIN — ACETAMINOPHEN 1000 MG: 500 TABLET ORAL at 18:00

## 2020-07-30 RX ADMIN — POLYETHYLENE GLYCOL 3350 1 PACKET: 17 POWDER, FOR SOLUTION ORAL at 10:30

## 2020-07-30 RX ADMIN — NAPROXEN 500 MG: 500 TABLET ORAL at 18:00

## 2020-07-30 RX ADMIN — BISACODYL 10 MG: 10 SUPPOSITORY RECTAL at 10:30

## 2020-07-30 RX ADMIN — LIDOCAINE 1 PATCH: 50 PATCH CUTANEOUS at 10:30

## 2020-07-30 RX ADMIN — NAPROXEN 500 MG: 500 TABLET ORAL at 07:30

## 2020-07-30 RX ADMIN — OMEPRAZOLE 20 MG: 20 CAPSULE, DELAYED RELEASE ORAL at 04:13

## 2020-07-30 RX ADMIN — ENOXAPARIN SODIUM 40 MG: 40 INJECTION SUBCUTANEOUS at 04:14

## 2020-07-30 RX ADMIN — RIFAMPIN 600 MG: 300 CAPSULE ORAL at 04:14

## 2020-07-30 RX ADMIN — OXYCODONE 5 MG: 5 TABLET ORAL at 04:12

## 2020-07-30 RX ADMIN — ACETAMINOPHEN 1000 MG: 500 TABLET ORAL at 04:12

## 2020-07-30 RX ADMIN — DOXYCYCLINE 100 MG: 100 TABLET, FILM COATED ORAL at 04:13

## 2020-07-30 RX ADMIN — ACETAMINOPHEN 1000 MG: 500 TABLET ORAL at 12:00

## 2020-07-30 ASSESSMENT — ENCOUNTER SYMPTOMS
HEMOPTYSIS: 0
CONSTIPATION: 1
HEARTBURN: 0
VOMITING: 0
ABDOMINAL PAIN: 0
MYALGIAS: 0
DOUBLE VISION: 0
NECK PAIN: 0
COUGH: 0
BLURRED VISION: 0
SINUS PAIN: 0
TINGLING: 0
BACK PAIN: 0
PALPITATIONS: 0
ORTHOPNEA: 0
DEPRESSION: 0
CHILLS: 0
PHOTOPHOBIA: 0
DIZZINESS: 0
DIAPHORESIS: 0
SORE THROAT: 0
SPUTUM PRODUCTION: 0
HEADACHES: 0
FOCAL WEAKNESS: 0
SHORTNESS OF BREATH: 0
FEVER: 0
BRUISES/BLEEDS EASILY: 0
NAUSEA: 0
SPEECH CHANGE: 0

## 2020-07-30 ASSESSMENT — COGNITIVE AND FUNCTIONAL STATUS - GENERAL
HELP NEEDED FOR BATHING: A LITTLE
DAILY ACTIVITIY SCORE: 17
SUGGESTED CMS G CODE MODIFIER DAILY ACTIVITY: CK
PERSONAL GROOMING: A LITTLE
TOILETING: A LITTLE
DRESSING REGULAR UPPER BODY CLOTHING: A LITTLE
EATING MEALS: A LITTLE
DRESSING REGULAR LOWER BODY CLOTHING: A LOT

## 2020-07-30 ASSESSMENT — GAIT ASSESSMENTS
ASSISTIVE DEVICE: FRONT WHEEL WALKER
DEVIATION: BRADYKINETIC;DECREASED HEEL STRIKE;DECREASED TOE OFF
GAIT LEVEL OF ASSIST: SUPERVISED
DISTANCE (FEET): 30

## 2020-07-30 ASSESSMENT — LIFESTYLE VARIABLES: SUBSTANCE_ABUSE: 0

## 2020-07-30 NOTE — ASSESSMENT & PLAN NOTE
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  - Never treated or told he has abnormal tuberculosis testing previously.  - ID recommended Rifampin 4 months   - Establish with a PCP as soon as possible   - Follow up outpatient in 2 weeks post DC with ID  - Patient tolerating well drug

## 2020-07-30 NOTE — ASSESSMENT & PLAN NOTE
- Arthrocentesis showed few monosodium urate crystals  - Hx of monoarthritis in the past for about 10-12 years, not severe stating that never went to the Dr for this type of pain, it would resolve in 1-2 days with OTC NSAID's  - This presentation was the first time with Polyarthropathy   - We are treating as acute gout as well.  - Patient will need outpatient Rheum follow up   - Also needs to establish with a PCP  - Continues to improve in mobility, now walking independently outside his room with walker.  - 8/6 Patient able to walk w/o devices, now at baseline, knees and feet no swelling or pain at all.  - Establish with a PCP  - CM no new updates

## 2020-07-30 NOTE — DISCHARGE PLANNING
Agency/Facility Name: [facility]  Spoke To: Patria  Outcome: Working with patients workers comp for SNF authorization. Pending at this time. Will update CCA.     REA Reynolds notified.

## 2020-07-30 NOTE — PROGRESS NOTES
Report received from VERONA Jaramillo.  Patient sitting on the side of the bed eating his dinner.  POC gone over with pt and all questions answered at this time.  Patient A & O  X 4.  No apparent signs of distress.  Safety precautions in place.  Patient educated to call for assistance.  Will continue to monitor.

## 2020-07-30 NOTE — PROGRESS NOTES
Daily Progress Note:     Date of Service: 7/30/2020  Primary Team: [facility] IM Green Team   Attending: Yash Calderon M.D.   Senior Resident: Dr. Starr   Intern: Dr. Marinelli  Contact:  [phone]    Chief Complaint:   Polyarthropathy  Gout    Subjective:   No acute events overnight reported.  Patient states mild improvement in most his joints except his Left Knee, which has become more painful and his ROM has worsened.  I also noted patient has not had a BM in 3-4 days, RN instructed to give suppository.  Lyme titters negative, Serologies pending.  Quantiferon consistent with latent TB, patient apparently has never been in contact with anyone with TB or tretaed for TB, [place] and has never been out of the [place] ever since.  Patient will be discharged today to [facility] and follow up with Rheum outpatient.  We will also continue empiric therapy for Brucellosis.  Patient had a Large bowel movement today 7/30, abdominal xray showed moderate stool burden but no signs of obstruction.    Consultants/Specialty:  ID    Review of Systems:      Review of Systems   Constitutional: Negative for chills, diaphoresis and fever.   HENT: Negative for ear pain, hearing loss, sinus pain, sore throat and tinnitus.    Eyes: Negative for blurred vision, double vision and photophobia.   Respiratory: Negative for cough, hemoptysis, sputum production and shortness of breath.    Cardiovascular: Negative for chest pain, palpitations, orthopnea and leg swelling.   Gastrointestinal: Positive for constipation. Negative for abdominal pain, heartburn, nausea and vomiting.   Genitourinary: Negative for dysuria, frequency, hematuria and urgency.   Musculoskeletal: Positive for joint pain. Negative for back pain, myalgias and neck pain.   Skin: Negative for itching and rash.   Neurological: Negative for dizziness, tingling, speech change, focal weakness and headaches.   Endo/Heme/Allergies: Negative for  environmental allergies. Does not bruise/bleed easily.   Psychiatric/Behavioral: Negative for depression, substance abuse and suicidal ideas.   All other systems reviewed and are negative.      Objective Data:   Physical Exam:   Vitals:   Temp:  [36.4 °C (97.5 °F)-36.8 °C (98.3 °F)] 36.8 °C (98.3 °F)  Pulse:  [65-73] 73  Resp:  [16-18] 16  BP: (144-159)/() 153/104  SpO2:  [94 %-96 %] 95 %     Physical Exam  Constitutional:       General: He is not in acute distress.     Appearance: Normal appearance. He is not ill-appearing.   HENT:      Head: Normocephalic and atraumatic.      Nose: Nose normal. No congestion.      Mouth/Throat:      Mouth: Mucous membranes are moist.      Pharynx: No oropharyngeal exudate or posterior oropharyngeal erythema.   Eyes:      General: No scleral icterus.     Extraocular Movements: Extraocular movements intact.      Conjunctiva/sclera: Conjunctivae normal.      Pupils: Pupils are equal, round, and reactive to light.   Neck:      Musculoskeletal: Normal range of motion and neck supple. No neck rigidity or muscular tenderness.   Cardiovascular:      Rate and Rhythm: Normal rate and regular rhythm.      Pulses: Normal pulses.      Heart sounds: Normal heart sounds. No murmur. No gallop.    Pulmonary:      Effort: Pulmonary effort is normal. No respiratory distress.      Breath sounds: Normal breath sounds. No wheezing, rhonchi or rales.   Abdominal:      General: Bowel sounds are normal. There is no distension.      Tenderness: There is no abdominal tenderness. There is no guarding or rebound.   Musculoskeletal:         General: Swelling and tenderness present.      Right lower leg: No edema.      Left lower leg: No edema.      Comments: Ankles, feet  and right Knee remarkable improvement, no pain to passive or active movement, normal ROM, no swelling or increased local temperature.  Elbows non tender, but mildly decrease ROM, with slight swelling.  Right wrist and fingers mildly  improvement in swelling, continues to have significant pain to passive and active movement with moderate reduced ROM.  Left wrist not improvement at all, with significant pain with movement, ROM moderately reduced.   Skin:     General: Skin is warm.      Coloration: Skin is not jaundiced.      Findings: No erythema.   Neurological:      General: No focal deficit present.      Mental Status: He is alert and oriented to person, place, and time. Mental status is at baseline.      Cranial Nerves: No cranial nerve deficit.      Sensory: No sensory deficit.      Motor: No weakness.   Psychiatric:         Mood and Affect: Mood normal.         Behavior: Behavior normal.         Thought Content: Thought content normal.           Labs:   Review    Imaging:   Review    Problem Representation:     Polyarthralgia with peripheral swelling  Assessment & Plan  - No previous Hx of Rheumatologic disease  - Denied family history of Rheumatoid conditions  - Patient stated that in the past he has had Joint swelling and pain, but he has been able to tolerate it and resolve with OTC NSAID's  - This is the first time it get very severe and first time that this many joints are involved  - Ankles, right knee and feet resolved pain and swelling.  - Worsening of his Left Knee, unable to flex at all, very severe with movement.  - wrists and fingers mild improvement with exception of his left wrist.  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    - VS at ED unremarkable  - Imaging knees showed mild degenerative disease  - Cbc- 17.9 wbc, hb 12.6  - Crp-12.61 (elevated)  - Esr-elevated at 59  - Uric acid- 4.6  - ID consulted 7/27  - Awaiting labs                      PLAN:  - Continue same management  - Cultures NGTD  - Autoimmune workup negative  -  Pain management-   - Rifampin and Doxy started to cover Brucellosis   - ID ordered: Lyme titers negative, Blsto and Histoplasmosis serology pending, RPR negative  - CTM  - Encourage ambulation if possible  - DC to [facility]  - Follow up outpatient with Rheum        Elevated liver enzymes  Assessment & Plan  - Normal liver enzymes on admission  - CMP 7/30 no significant changes, AST:64, ALT:162  - Likely secondary to Rifampin  - CTM             PLAN:  - Continue monitoring   - Daily CMP  - Consider switching Abx alternative if significant worsening labs    Latent tuberculosis  Assessment & Plan  Positive quantiferon, lived in [place] until 15 years ago. Never treated or told he has abnormal tuberculosis testing previously.    Hypokalemia  Assessment & Plan  - Resolved  - K 3.7 on admission  - 7/27 3.5  - 7/28 3.9  - CTM and replete if needed    Constipation  Assessment & Plan  - Patient has not had a Bowel movement for 4 days  - Patient denied abdominal pain, distention, unremarkable abdominal exam.  - Likely sec to immobilization and drugs  -Plain filme abdomen showed moderate stool burden but not obstruction.  - Patient had a large BM after given suppository            PLAN:  - Plenty of fluids  - Stop Oxy  - CTM  - DC to [facility]  - Stool softeners PRN    Acute gout of knee- (present on admission)  Assessment & Plan  - Arthrocentesis showed few monosodium urate crystals  - Hx of monoarthritis in the past for about 10-12 years, not severe stating that never went to the Dr for this type of pain, it would resolve in 1-2 days with OTC NSAID's  - This presentation was the first time with Polyarthropathy   - We are treating as acute gout as well.  - Patient will need outpatient Rheum follow up

## 2020-07-30 NOTE — CARE PLAN
Problem: Communication  Goal: The ability to communicate needs accurately and effectively will improve  Outcome: PROGRESSING AS EXPECTED     Problem: Safety  Goal: Will remain free from falls  Outcome: PROGRESSING AS EXPECTED  Note: Pt educated on safety precautions, utilization of the call light, and bed alarm.  Pt verbalized understanding.      Problem: Pain Management  Goal: Pain level will decrease to patient's comfort goal  Outcome: PROGRESSING AS EXPECTED     Problem: Infection  Goal: Will remain free from infection  Outcome: PROGRESSING AS EXPECTED  Note: Implement standard precautions and perform handwashing before and after patient contact. RN will follow protocols and necessary steps to minimize the spread of infection.  RN educated pt and any visitors on proper hand hygiene.

## 2020-07-30 NOTE — DISCHARGE SUMMARY
Internal Medicine Discharge Summary     Date of Admission: 07/25/2020  Date of Discharge: 08/07/2020  Service: Internal Medicine  Attending Physician: Rocío Calderon MD  Senior Resident: Tarik Starr M.D.  Intern: Néstor Marinelli MD    Discharge Diagnosis:   Inflammatory polyarthritis, possibly secondary to gout    Hospital problems:  Active Problems:    Polyarthralgia with peripheral swelling POA: Unknown    Elevated liver enzymes POA: Unknown    Hypokalemia POA: Unknown    Latent tuberculosis POA: Unknown    Acute gout of knee POA: Yes    Constipation POA: Unknown  Resolved Problems:    * No resolved hospital problems. *        Hospital Course:   Fede Miller is a 59 year old gentleman without known medical history who presented 7/25/2020 complaining of acute onset of 1 week of worsening symmetric polyarthritis of his bilateral ankles, knees, wrists, left shoulder, and left elbow in the setting of leukocytosis, elevated inflammatory markers, and occasional night sweats. He was admitted for further evaluation and received an initial dose of methylprednisolone. 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 Joint aspiration was performed and notable for an inflammatory aspirate with 10-15,000 WBCs with neutrophil predominance as well as a few monosodium urate crystals. While awaiting brucellosis and other zoonotic and fungal serologies, empiric treatment for an acute gout flare was initiated with high-dose Naproxen as well as empiric Doxycycline and Rifampin for possible brucellosis. Infectious disease consultation was obtained and recommendations were to continue antibiotic therapy however subsequent brucellosis antibody testing was negative, and thus antibiotics were discontinued. He gradually improved on this regimen throughout his hospitalization but required placement at a skilled nursing facility to improve his mobility.  Workup including autoimmune serologies, RPR, gonorrhea/chlamydia, blood and joint cultures, lyme serologies, were negative. He had a positive quantiferon test with no respiratory symptoms and a negative chest x-ray indicating latent tuberculosis for which he was started on Rifampin. He was then discharged in stable condition to home on 08/07/2020 with recommended primary care follow-up and rheumatology referral per primary care as well as infectious disease.    Consultants:   Infectious disease    Physical Exam on Day of Discharge:   Vitals:    07/29/20 2330 07/30/20 0350 07/30/20 0900 07/30/20 1252   BP: 159/88 144/90 151/85 153/104   Pulse: 65 70 68 73   Resp: 18 16 16 16   Temp: 36.7 °C (98 °F) 36.6 °C (97.8 °F) 36.7 °C (98.1 °F) 36.8 °C (98.3 °F)   TempSrc: Temporal Temporal Temporal Temporal   SpO2: 96% 95% 94% 95%   Weight:       Height:         Weight/BMI: Body mass index is 26.55 kg/m².  Pulse Oximetry: 95 %, O2 (LPM): 0, O2 Delivery Device: None - Room Air    General: No acute distress, appears comfortable  CVS: RRR no m/r/g  PULM: Clear to auscultation bilaterally  ABD: Non-tender, non-distended, +BS  EXTR: No peripheral edema, continuing left wrist tenderness with decreased ROM, decreased ROM left knee, otherwise improving  NEURO: Alert and oriented x4    Most Recent Labs:    Lab Results   Component Value Date/Time    WBC 12.8 (H) 07/28/2020 04:30 AM    RBC 4.37 (L) 07/28/2020 04:30 AM    HEMOGLOBIN 12.6 (L) 07/28/2020 04:30 AM    HEMATOCRIT 38.6 (L) 07/28/2020 04:30 AM    MCV 88.3 07/28/2020 04:30 AM    MCH 28.8 07/28/2020 04:30 AM    MCHC 32.6 (L) 07/28/2020 04:30 AM    MPV 9.0 07/28/2020 04:30 AM    NEUTSPOLYS 75.70 (H) 07/28/2020 04:30 AM    LYMPHOCYTES 12.60 (L) 07/28/2020 04:30 AM    MONOCYTES 8.70 07/28/2020 04:30 AM    EOSINOPHILS 1.20 07/28/2020 04:30 AM    BASOPHILS 0.50 07/28/2020 04:30 AM      Lab Results   Component Value Date/Time    SODIUM 135 07/29/2020 05:31 AM    POTASSIUM 3.8 07/29/2020  05:31 AM    CHLORIDE 101 07/29/2020 05:31 AM    CO2 22 07/29/2020 05:31 AM    GLUCOSE 117 (H) 07/29/2020 05:31 AM    BUN 22 07/29/2020 05:31 AM    CREATININE 0.62 07/29/2020 05:31 AM      Lab Results   Component Value Date/Time    ALTSGPT 64 (H) 07/29/2020 05:31 AM    ASTSGOT 31 07/29/2020 05:31 AM    ALKPHOSPHAT 162 (H) 07/29/2020 05:31 AM    TBILIRUBIN 0.4 07/29/2020 05:31 AM    LIPASE 19 07/25/2020 09:04 PM    ALBUMIN 3.4 07/29/2020 05:31 AM    GLOBULIN 3.6 (H) 07/29/2020 05:31 AM     No results found for: PROTHROMBTM, INR     Procedures and Imaging:     RN-LMEMLTF-3 VIEW   Final Result      No evidence of bowel obstruction.      Moderate amount of colonic stool.      DX-KNEE 2- LEFT   Final Result      1.  Mild degenerative change of LEFT knee.   2.  No fracture or dislocation.      DX-KNEE 2- RIGHT   Final Result      1.  Mild to moderate osteoarthritis of the knee.   2.  Moderate joint effusion.   3.  No acute osseous abnormality.      DX-ANKLE 3+ VIEWS RIGHT   Final Result      1.  No acute osseous abnormality.   2.  Mild degenerative changes.   3.  Moderate plantar calcaneal spur.         DX-WRIST-COMPLETE 3+ LEFT   Final Result      1.  Mild to moderate degenerative changes of the wrist and borderline widening of the scapholunate interval.   2.  No acute osseous abnormality.      DX-CHEST-PORTABLE (1 VIEW)   Final Result      No acute cardiopulmonary abnormality.          Condition at Discharge:   Stable     Disposition:    Home    Discharge Medications:      Medication List      START taking these medications      Instructions   lidocaine 5 % Ptch  Commonly known as: LIDODERM   Apply 1 Patch to skin as directed every 24 hours.  Dose: 1 Patch     naproxen 500 MG Tabs  Commonly known as: NAPROSYN   Take 1 Tab by mouth 2 times a day, with meals for 14 days.  Dose: 500 mg     omeprazole 20 MG delayed-release capsule  Commonly known as: PRILOSEC   Take 1 Cap by mouth every day.  Dose: 20 mg     polyethylene  glycol/lytes 17 g Pack  Commonly known as: MIRALAX   Take 1 Packet by mouth every day.  Dose: 17 g     riFAMPin 300 MG Caps  Commonly known as: RIFADINE   Take 2 Caps by mouth every day for 30 days.  Dose: 600 mg        CHANGE how you take these medications      Instructions   acetaminophen 500 MG Tabs  What changed:   · how much to take  · when to take this  · reasons to take this  Commonly known as: TYLENOL   Take 2 Tabs by mouth 3 times a day.  Dose: 1,000 mg        STOP taking these medications    ibuprofen 200 MG Tabs  Commonly known as: MOTRIN              Instructions:   Follow up with primary care and request a rheumatology consult  Follow up with infectious disease  Take the naproxen scheduled twice daily with food for 14 days    The patient was instructed to return to the ER in the event of worsening symptoms. I have counseled the patient on the importance of compliance and the patient has agreed to proceed with all medical recommendations and follow up plan indicated above.   The patient understands that all medications come with benefits and risks. Risks may include permanent injury or death and these risks can be minimized with close reassessment and monitoring.        Follow-up:   Primary care - would likely benefit from referral to rheumatology given very atypical presentation for gout versus other autoimmune etiology. Recommend consideration of allopurinol 3 weeks after symptoms began. Recommend treatment of latent TB with 4 months of rifampin. Ensure that LFTs are monitored while on therapy. Recommend avoidance of steroid therapy given his latent TB. Omeprazole can be discontinued after completion of naproxen.    Recommend requesting records to follow-up.        Time Spent on Discharge: 40 minutes

## 2020-07-30 NOTE — DISCHARGE PLANNING
Agency/Facility Name: [facility]  Outcome: Left voice message for Deidre in regards to bed availability/worker caitie comp authorization. Requested a call back.

## 2020-07-30 NOTE — THERAPY
Occupational Therapy  Daily Treatment     Patient Name: Fede Miller  Age:  59 y.o., Sex:  male  Medical Record #: [id number]  Today's Date: 7/30/2020     Precautions  Precautions: Fall Risk    Assessment    Pt motivated and agreeable to participate in OT session. Pt provided with resistive sponges to work on bilateral  strength if pain free. Pt educated on use of sock aid to don socks, however due to poor  strength, pt required maxA to don socks. Pt provided with built up  to use with utensils and toothbrush as pt states he has difficulty holding onto small objects. Will continue to work with pt while here to address UE strengthening/AROM, ADLs, and ADL transfers.    Plan    Continue current treatment plan.    Discharge recommendations: Recommend post-acute placement for additional occupational therapy services prior to discharge home. Recommend PMR consult.     Subjective    Pt alert, pleasant, and agreeable to OT tx session.      Objective       07/30/20 1040   Hand Strengthening   Hand Strengthening Right ;Left    Comment  exercises with resistive sponge. Pt provided with yellow and red for progression. Pt educated to complete 20 reps each hand 3x/day in pain free range.   Other Treatments   Other Treatments Provided Provided with built up  for utensils and toothbrush.   Bed Mobility    Supine to Sit Supervised   Activities of Daily Living   Lower Body Dressing Maximal Assist  (for socks with use of AE)   Patient / Family Goals   Patient / Family Goal #1 To return to PLOF   Short Term Goals   Short Term Goal # 1 Pt will complete toilet xfer supv   Goal Outcome # 1 Goal not met   Short Term Goal # 2 Pt will complete LB dressing supv   Goal Outcome # 2 Progressing slower than expected   Short Term Goal # 3 Pt will complete G/H (brushing teeth) supv   Goal Outcome # 3 Goal not met   Anticipated Discharge Equipment   DC Equipment Unable To Determine At This Time

## 2020-07-30 NOTE — THERAPY
Physical Therapy   Daily Treatment     Patient Name: Fede Miller  Age:  59 y.o., Sex:  male  Medical Record #: [id number]  Today's Date: 7/30/2020     Precautions: Fall Risk    Assessment    Patient demonstrates improved knee flexion after therex, including knee flexion holds, and improved gait pattern with FWW, cueing knee flexion during swing.  Patient does have pain with knee movement, but once stationary, even in knee flexion, pain subsides.  Significant education provided on ambulating with nursing staff 3x/day with FWW and performing HEP of sitting knee flexion. Weight bearing painful during, but appears to improve knee flexibility and pain control post.         07/30/20 0950   Precautions   Precautions Fall Risk   Comments Greek speaking, left knee poor ROM   Sitting Lower Body Exercises   Sitting Lower Body Exercises Yes   Standing Lower Body Exercises   Standing Lower Body Exercises Yes   Other Exercises knee flexion holds 2 x 5 x 10 breaths    Neuro-Muscular Treatments   Neuro-Muscular Treatments Weight Shift Left   Balance   Sitting Balance (Static) Fair +   Sitting Balance (Dynamic) Fair +   Standing Balance (Static) Fair   Standing Balance (Dynamic) Fair   Weight Shift Sitting Fair   Weight Shift Standing Fair   Skilled Intervention Verbal Cuing   Comments FWW   Gait Analysis   Gait Level Of Assist Supervised   Assistive Device Front Wheel Walker   Distance (Feet) 30   # of Times Distance was Traveled 2   Deviation Bradykinetic;Decreased Heel Strike;Decreased Toe Off   Weight Bearing Status fwb   Comments significant improved knee flexion with FWW   Bed Mobility    Supine to Sit Supervised   Sit to Supine Supervised   Scooting Supervised   Functional Mobility   Sit to Stand Minimal Assist  (very slow, needs help for initiation)   Short Term Goals    Short Term Goal # 1 pt will perform supine <> sit without bed features with SPV in 6 visits to be able to get in/out of bed at home   Goal Outcome  # 1 Progressing as expected   Short Term Goal # 2 pt will perform all functional xfrs with SPV in 6 visits for improved independence   Goal Outcome # 2 Progressing as expected   Short Term Goal # 3 pt will ambulate 150ft with SPV in 6 visits to access home   Goal Outcome # 3 Progressing as expected   Short Term Goal # 4 pt will negotiate FOS with SPV in 6 visits to access home   Goal Outcome # 4 Goal not met   Anticipated Discharge Equipment   DC Equipment Unable To Determine At This Time   Interdisciplinary Plan of Care Collaboration   IDT Collaboration with  Nursing   Patient Position at End of Therapy Edge of Bed;Call Light within Reach;Tray Table within Reach;Phone within Reach   Collaboration Comments results of PT       Plan    Continue current treatment plan.    Discharge recommendations:  Post Acute Placement

## 2020-07-30 NOTE — PROGRESS NOTES
Infectious Disease Daily Progress Note    Date of Service  7/30/2020    Chief Complaint  Doxycycline 7/27-present  Rifampin 7/27-present     7/28: No acute issues. No fever. Joint pain a bit better but ROM still problematic.  7/29: Significant improvement with swelling and ROM with R hand and R knee  7/30: Improved ROM to most joints. Pains still present but better. No fevers. Walking in halls.     Review of Systems  Review of Systems   Musculoskeletal:        Bilateral hand and wrist swelling improving. R knee improved over L knee   All other systems reviewed and are negative.       Physical Exam  Temp:  [36.4 °C (97.5 °F)-36.8 °C (98.3 °F)] 36.8 °C (98.3 °F)  Pulse:  [65-73] 73  Resp:  [16-18] 16  BP: (144-159)/() 153/104  SpO2:  [94 %-96 %] 95 %    Physical Exam  Constitutional:       Appearance: Normal appearance.   HENT:      Head: Normocephalic and atraumatic.   Cardiovascular:      Rate and Rhythm: Normal rate and regular rhythm.      Pulses: Normal pulses.      Heart sounds: Murmur present.   Pulmonary:      Effort: Pulmonary effort is normal. No respiratory distress.      Breath sounds: No wheezing.   Abdominal:      General: Abdomen is flat. There is no distension.      Tenderness: There is no abdominal tenderness.   Musculoskeletal:      Comments: Bilateral hand and wrist swelling. Improved ROM with R wrist. L wrist still stiff. Similarly R knee with imporved flexion and extension. L knee limited with 70 degree flexion   Neurological:      General: No focal deficit present.      Mental Status: He is alert.   Psychiatric:         Mood and Affect: Mood normal.         Fluids    Intake/Output Summary (Last 24 hours) at 7/30/2020 1633  Last data filed at 7/30/2020 0350  Gross per 24 hour   Intake 240 ml   Output 1150 ml   Net -910 ml       Laboratory  Recent Labs     07/28/20  0430   WBC 12.8*   RBC 4.37*   HEMOGLOBIN 12.6*   HEMATOCRIT 38.6*   MCV 88.3   MCH 28.8   MCHC 32.6*   RDW 47.8   PLATELETCT  462*   MPV 9.0     Recent Labs     07/28/20  0430 07/29/20  0531   SODIUM 137 135   POTASSIUM 3.9 3.8   CHLORIDE 103 101   CO2 22 22   GLUCOSE 102* 117*   BUN 21 22   CREATININE 0.61 0.62   CALCIUM 8.9 9.0                Plan:  1.  Acute polyarticular arthritis.    2.  Leukocytosis due to #1.    3.  Chronic left ankle rash.    4.  Latent TB      Recommendation  - Anti inflammatories per IM.   - Can stop Doxy. Brucelosis titer neg  - Has latent TB now - Rifampin 4 months - follow in my office ~ 2 weeks with CBC, CMP, CRP, ESR.  - Gouty arthritis still seems highly possibly diagnosis although atypical. Curiosity towards Reactive arthritis.   - Rheumatology referral would benefit patient  - Syphilis neg by RPR.   - Ok to discharge from and ID perspective.     35 minutes was spent with 50% face-to-face care and med direction for outpatient therapy.      Case was discussed with primary team. Case was discussed with the patient Hungarian.    Thank you for this most interesting consultation.    Dr Vaughan

## 2020-07-31 LAB
BACTERIA BLD CULT: NORMAL
BACTERIA BLD CULT: NORMAL
C IMMITIS IGM SPEC QL IA: 0.5 IV
COCCIDIOIDES AB SPEC QL ID: ABNORMAL
COCCIDIOIDES AB TITR SER CF: ABNORMAL {TITER}
COCCIDIOIDES IGG SPEC QL IA: 1 IV
SIGNIFICANT IND 70042: NORMAL
SIGNIFICANT IND 70042: NORMAL
SITE SITE: NORMAL
SITE SITE: NORMAL
SOURCE SOURCE: NORMAL
SOURCE SOURCE: NORMAL

## 2020-07-31 PROCEDURE — 700102 HCHG RX REV CODE 250 W/ 637 OVERRIDE(OP): Performed by: STUDENT IN AN ORGANIZED HEALTH CARE EDUCATION/TRAINING PROGRAM

## 2020-07-31 PROCEDURE — G0378 HOSPITAL OBSERVATION PER HR: HCPCS

## 2020-07-31 PROCEDURE — 99226 PR SUBSEQUENT OBSERVATION CARE,LEVEL III: CPT | Mod: GC | Performed by: HOSPITALIST

## 2020-07-31 PROCEDURE — 700111 HCHG RX REV CODE 636 W/ 250 OVERRIDE (IP): Performed by: STUDENT IN AN ORGANIZED HEALTH CARE EDUCATION/TRAINING PROGRAM

## 2020-07-31 PROCEDURE — 96372 THER/PROPH/DIAG INJ SC/IM: CPT

## 2020-07-31 PROCEDURE — 700101 HCHG RX REV CODE 250: Performed by: STUDENT IN AN ORGANIZED HEALTH CARE EDUCATION/TRAINING PROGRAM

## 2020-07-31 PROCEDURE — 97116 GAIT TRAINING THERAPY: CPT

## 2020-07-31 PROCEDURE — A9270 NON-COVERED ITEM OR SERVICE: HCPCS | Performed by: STUDENT IN AN ORGANIZED HEALTH CARE EDUCATION/TRAINING PROGRAM

## 2020-07-31 RX ADMIN — ENOXAPARIN SODIUM 40 MG: 40 INJECTION SUBCUTANEOUS at 07:06

## 2020-07-31 RX ADMIN — LIDOCAINE 1 PATCH: 50 PATCH CUTANEOUS at 09:42

## 2020-07-31 RX ADMIN — OMEPRAZOLE 20 MG: 20 CAPSULE, DELAYED RELEASE ORAL at 07:07

## 2020-07-31 RX ADMIN — ACETAMINOPHEN 1000 MG: 500 TABLET ORAL at 17:52

## 2020-07-31 RX ADMIN — NAPROXEN 500 MG: 500 TABLET ORAL at 09:41

## 2020-07-31 RX ADMIN — NAPROXEN 500 MG: 500 TABLET ORAL at 17:53

## 2020-07-31 RX ADMIN — ACETAMINOPHEN 1000 MG: 500 TABLET ORAL at 13:13

## 2020-07-31 RX ADMIN — RIFAMPIN 600 MG: 300 CAPSULE ORAL at 07:07

## 2020-07-31 RX ADMIN — POLYETHYLENE GLYCOL 3350 1 PACKET: 17 POWDER, FOR SOLUTION ORAL at 07:05

## 2020-07-31 RX ADMIN — ACETAMINOPHEN 1000 MG: 500 TABLET ORAL at 07:06

## 2020-07-31 ASSESSMENT — COGNITIVE AND FUNCTIONAL STATUS - GENERAL
SUGGESTED CMS G CODE MODIFIER MOBILITY: CH
MOBILITY SCORE: 24

## 2020-07-31 ASSESSMENT — ENCOUNTER SYMPTOMS
SORE THROAT: 0
BRUISES/BLEEDS EASILY: 0
HEARTBURN: 0
BACK PAIN: 0
FEVER: 0
DIZZINESS: 0
SENSORY CHANGE: 0
HEADACHES: 0
CONSTIPATION: 0
VOMITING: 0
DIARRHEA: 0
DOUBLE VISION: 0
ORTHOPNEA: 0
PALPITATIONS: 0
BLOOD IN STOOL: 0
NAUSEA: 0
PHOTOPHOBIA: 0
CHILLS: 0
ABDOMINAL PAIN: 0
NECK PAIN: 0
SHORTNESS OF BREATH: 0
HEMOPTYSIS: 0
DIAPHORESIS: 0
COUGH: 0
SPEECH CHANGE: 0
BLURRED VISION: 0
MYALGIAS: 0
DEPRESSION: 0
HALLUCINATIONS: 0
FOCAL WEAKNESS: 0

## 2020-07-31 ASSESSMENT — GAIT ASSESSMENTS
DEVIATION: DECREASED BASE OF SUPPORT;INCREASED BASE OF SUPPORT
ASSISTIVE DEVICE: FRONT WHEEL WALKER
GAIT LEVEL OF ASSIST: SUPERVISED
DISTANCE (FEET): 300

## 2020-07-31 NOTE — PROGRESS NOTES
Infectious Disease Daily Progress Note    Date of Service  7/31/2020    Chief Complaint  Doxycycline 7/27-7-30  Rifampin 7/27-present     7/28: No acute issues. No fever. Joint pain a bit better but ROM still problematic.  7/29: Significant improvement with swelling and ROM with R hand and R knee  7/30: Improved ROM to most joints. Pains still present but better. No fevers. Walking in halls.   7/31: Patient reports that he is feeling significantly better and was able to walk three laps around the floor. He denies any abdominal pain.     Review of Systems  Review of Systems   Gastrointestinal: Negative for abdominal pain, constipation and diarrhea.   Musculoskeletal:        Improved swelling in bilateral wrists. Increased range of motion in the knees R>L.    All other systems reviewed and are negative.       Physical Exam  Temp:  [36.1 °C (96.9 °F)-36.8 °C (98.3 °F)] 36.1 °C (96.9 °F)  Pulse:  [57-73] 57  Resp:  [14-16] 14  BP: (124-165)/() 124/79  SpO2:  [94 %-97 %] 96 %    Physical Exam  Constitutional:       Appearance: Normal appearance.   HENT:      Head: Normocephalic and atraumatic.   Cardiovascular:      Rate and Rhythm: Normal rate and regular rhythm.      Pulses: Normal pulses.      Heart sounds: Murmur present.   Pulmonary:      Effort: Pulmonary effort is normal. No respiratory distress.      Breath sounds: No wheezing.   Abdominal:      General: Abdomen is flat. There is no distension.      Tenderness: There is no abdominal tenderness.   Musculoskeletal:      Comments: Bilateral hand and wrist swelling significantly improved from previous exam. Restricted flexion of left wrist. Right and left knees non-edematous. Intact range of motion of right knee. Left knee improved flexion to 80 degrees.    Neurological:      General: No focal deficit present.      Mental Status: He is alert.   Psychiatric:         Mood and Affect: Mood normal.         Fluids    Intake/Output Summary (Last 24 hours) at  7/31/2020 1141  Last data filed at 7/30/2020 2000  Gross per 24 hour   Intake 240 ml   Output --   Net 240 ml       Laboratory      Recent Labs     07/29/20  0531   SODIUM 135   POTASSIUM 3.8   CHLORIDE 101   CO2 22   GLUCOSE 117*   BUN 22   CREATININE 0.62   CALCIUM 9.0                Plan:  1.  Acute polyarticular arthritis.    2.  Leukocytosis due to #1.    3.  Chronic left ankle rash.    4.  Latent TB  5. Transaminitis, stable      Recommendation  - Continue anti-inflammatories as per primary team   - Gouty arthritis seems like the most possible diagnosis even though it is an atypical presentation. Would consider Rheumatology consult as outpatient to investigate reactive arthritis.   - Continue Rifampin 4 months   - Outpatient follow up in place with [facility] Infectious disease  - ID will now sign off, patient is okay to discharge from ID perspective.     Case was discussed with primary team. Case was discussed with the patient [place]. Thank you for this interesting consultation.    Case discussed with Dr. Luz.     Agree with above  Pending dispo to SNF. Will continue treatment with Latent TB with Rifampin. Office followup in [facility] ID office arranged. No evidence of Brucellosis. His septic arthritis is gout related. May benefit from Rheumatology followup.  ID service to sign off    Collaboration with Resident physician Dr Mckenzie  30 min spent with 50% face to face care

## 2020-07-31 NOTE — PROGRESS NOTES
Report received from VERONA Jaramillo.  Patient sitting up in bed eating his dinner.  POC gone over with pt and all questions answered at this time.  Patient A & O  X 4.  No apparent signs of distress.  Safety precautions in place.  Patient educated to call for assistance.  Will continue to monitor.

## 2020-07-31 NOTE — DISCHARGE PLANNING
Agency/Facility Name: [facility]  Spoke To: Linn  Outcome: Clinical documents received by workers comp, still in review. Linn to call this CCA once has any new updates. Notified VERONA Reynolds.

## 2020-07-31 NOTE — PROGRESS NOTES
Daily Progress Note:     Date of Service: 7/31/2020  Primary Team: [facility] IM Green Team   Attending: Yash Calderon M.D.   Senior Resident: Dr. Starr  Intern: Dr. Marinelli  Contact:  [phone]    Chief Complaint:     Polyarthropathy  Gout    Subjective:   No acute events overnight reported.  Patient was not able to discharged yesterday due to workers comp authorization issues.  Patient is clear to be discharge once he gets approved, CM working on transfer, but since he is a workers comp case might not happen till next week.  Putting order to transfer to Medical since might stay over the weekend, discussed with CM.  Patient doing better, he is now able to walk outside his room yesterday for the first time since admission.  Patient had a large bowel movement, constipation resolved.  Today no complains, denied fever, chills, night sweats.  ID recommended stop Doxy, he will be discharged on Rifampin for 4 months due to latent TB, he will follow up with ID in 2 weeks after discharge.  Patient stable, with significant improvement in mobility in the past 24 hrs.  He will also follow up with [facility] outpatient.    Consultants/Specialty:  ID  Orthopaedic    Review of Systems:      Review of Systems   Constitutional: Negative for chills, diaphoresis, fever and malaise/fatigue.   HENT: Negative for congestion, ear discharge, hearing loss, sore throat and tinnitus.    Eyes: Negative for blurred vision, double vision and photophobia.   Respiratory: Negative for cough, hemoptysis and shortness of breath.    Cardiovascular: Negative for chest pain, palpitations, orthopnea and leg swelling.   Gastrointestinal: Negative for abdominal pain, blood in stool, constipation, diarrhea, heartburn, nausea and vomiting.   Genitourinary: Negative for dysuria, frequency, hematuria and urgency.   Musculoskeletal: Positive for joint pain. Negative for back pain, myalgias and neck pain.   Skin: Negative for itching and rash.   Neurological:  Negative for dizziness, sensory change, speech change, focal weakness and headaches.   Endo/Heme/Allergies: Negative for environmental allergies. Does not bruise/bleed easily.   Psychiatric/Behavioral: Negative for depression, hallucinations and suicidal ideas.       Objective Data:   Physical Exam:   Vitals:   Temp:  [36.1 °C (96.9 °F)-36.8 °C (98.3 °F)] 36.1 °C (96.9 °F)  Pulse:  [57-73] 57  Resp:  [14-16] 14  BP: (124-165)/() 124/79  SpO2:  [94 %-97 %] 96 %  !  Physical Exam  Constitutional:       Appearance: He is not ill-appearing or diaphoretic.   HENT:      Head: Normocephalic and atraumatic.      Nose: Nose normal. No congestion or rhinorrhea.      Mouth/Throat:      Mouth: Mucous membranes are moist.      Pharynx: Oropharynx is clear. No oropharyngeal exudate or posterior oropharyngeal erythema.   Eyes:      General: No scleral icterus.     Extraocular Movements: Extraocular movements intact.      Conjunctiva/sclera: Conjunctivae normal.      Pupils: Pupils are equal, round, and reactive to light.   Neck:      Musculoskeletal: Normal range of motion. No neck rigidity or muscular tenderness.   Cardiovascular:      Rate and Rhythm: Normal rate and regular rhythm.      Pulses: Normal pulses.      Heart sounds: Normal heart sounds. No murmur.   Pulmonary:      Effort: Pulmonary effort is normal. No respiratory distress.      Breath sounds: Normal breath sounds. No wheezing, rhonchi or rales.   Abdominal:      General: Bowel sounds are normal. There is no distension.      Palpations: Abdomen is soft.      Tenderness: There is no abdominal tenderness. There is no guarding or rebound.   Musculoskeletal:         General: Swelling present.      Right lower leg: No edema.      Left lower leg: No edema.   Skin:     General: Skin is warm.      Capillary Refill: Capillary refill takes less than 2 seconds.      Coloration: Skin is not jaundiced.      Findings: No bruising, erythema or rash.   Neurological:       General: No focal deficit present.      Mental Status: He is alert and oriented to person, place, and time. Mental status is at baseline.      Cranial Nerves: No cranial nerve deficit.   Psychiatric:         Mood and Affect: Mood normal.         Behavior: Behavior normal.         Thought Content: Thought content normal.           Labs:   Review    Imaging:   Review    Problem Representation:  Patient presented to ED with Polyarthropathy, acute onset, no history of similar events, but had monoarthropathy in the past wich would resolve in 1-2 days with OTC NSAID's.  Extensive rheumatologic work up negative, some labs pending, arthrocentesis showed few urate crystals, he had high suspicion for brucella and was treated empirically.  Patient had mild improvement unfortunately severe arthropathy has not resolved significantly so patient will be DC to SNF facility for therapy, we will need follow up with ID and Rheum outpatient.    Polyarthralgia with peripheral swelling  Assessment & Plan  - No previous Hx of Rheumatologic disease  - Denied family history of Rheumatoid conditions  - Patient stated that in the past he has had Joint swelling and pain, but he has been able to tolerate it and resolve with OTC NSAID's  - This is the first time it get very severe and first time that this many joints are involved  - Ankles, right knee and feet resolved pain and swelling.  - 7/30 Left Knee improved, patient now walking on hallways on his own, still has reduced ROM, but much better.  - Wrists and fingers continue to  Improve, left wrists still swelling more evident on his left side, ROM worse as well.  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    - VS at ED unremarkable  - Imaging knees showed mild degenerative disease  - Admission  Labs: Cbc- 17.9 wbc, hb 12.6, Crp-12.61 (elevated) on, Esr-elevated at 56, Uric acid- 4.6  - ID consulted 7/27  - CM working on SNF transfer, likely to occur till next week.                      PLAN:  - Transfer to Medical status since might not get DC till next week  - Continue same management  - Cultures NGTD  - Autoimmune workup negative  - Pain management-   - Rifampin and Doxy started to cover Brucellosis   - ID ordered: Lyme titers negative, Blsto and Histoplasmosis serology negative, RPR negative  - CTM  - ID follow up 2 weeks after DC  - Encourage ambulation if possible  - DC to SNIF when approved  - Follow up outpatient with Rheum        Elevated liver enzymes  Assessment & Plan  - Normal liver enzymes on admission  - CMP 7/30 no significant changes, AST:64, ALT:162  - Likely secondary to Rifampin  - CTM             PLAN:  - Continue monitoring   - Daily CMP  - Consider switching Abx alternative if significant worsening labs    Latent tuberculosis  Assessment & Plan  - Positive quantiferon, lived in [place] until 15 years ago.  - Never treated or told he has abnormal tuberculosis testing previously.  - ID recommended Rifampin 4 months and stop doxy  - Follow up outpatient in 2 weeks post DC with ID    Hypokalemia  Assessment & Plan  - Resolved  - Patient eating well  - K 3.7 on admission  - 7/27 3.5  - 7/28 3.9  - CTM and replete if needed    Constipation  Assessment & Plan  - Resolved  - Had a large BM after suppository    Acute gout of knee- (present on admission)  Assessment & Plan  - Arthrocentesis showed few monosodium urate crystals  - Hx of monoarthritis in the past for about 10-12 years, not severe stating that never went to the Dr for this type of pain, it would resolve in 1-2 days with OTC NSAID's  - This presentation was the first time with Polyarthropathy   - We are treating as acute gout as well.  - Patient will need outpatient Rheum follow up   - Also needs to establish with a PCP  - CM  working on DC to SNF

## 2020-07-31 NOTE — DISCHARGE PLANNING
Received call from [facility] that [facility] is still awaiting auth from Workers Comp for admit. Gini RHOADES updated.

## 2020-07-31 NOTE — CARE PLAN
Problem: Communication  Goal: The ability to communicate needs accurately and effectively will improve  Outcome: MET     Problem: Safety  Goal: Will remain free from injury  Outcome: MET     Problem: Venous Thromboembolism (VTW)/Deep Vein Thrombosis (DVT) Prevention:  Goal: Patient will participate in Venous Thrombosis (VTE)/Deep Vein Thrombosis (DVT)Prevention Measures  Outcome: MET      4 8 16 MILD SRF RETX AND F/U 5-6 WKS

## 2020-07-31 NOTE — CARE PLAN
Problem: Safety  Goal: Will remain free from falls  Outcome: PROGRESSING AS EXPECTED  Note: Pt educated on safety precautions, utilization of the call light, and bed alarm.  Pt verbalized understanding.      Problem: Pain Management  Goal: Pain level will decrease to patient's comfort goal  Outcome: PROGRESSING AS EXPECTED     Problem: Infection  Goal: Will remain free from infection  Outcome: PROGRESSING AS EXPECTED  Note: Implement standard precautions and perform handwashing before and after patient contact. RN will follow protocols and necessary steps to minimize the spread of infection.  RN educated pt and any visitors on proper hand hygiene.

## 2020-08-01 LAB
ALBUMIN SERPL BCP-MCNC: 3.3 G/DL (ref 3.2–4.9)
ALBUMIN/GLOB SERPL: 1 G/DL
ALP SERPL-CCNC: 143 U/L (ref 30–99)
ALT SERPL-CCNC: 39 U/L (ref 2–50)
ANION GAP SERPL CALC-SCNC: 14 MMOL/L (ref 7–16)
AST SERPL-CCNC: 15 U/L (ref 12–45)
BILIRUB SERPL-MCNC: 0.2 MG/DL (ref 0.1–1.5)
BUN SERPL-MCNC: 21 MG/DL (ref 8–22)
CALCIUM SERPL-MCNC: 9 MG/DL (ref 8.5–10.5)
CHLORIDE SERPL-SCNC: 101 MMOL/L (ref 96–112)
CO2 SERPL-SCNC: 20 MMOL/L (ref 20–33)
CREAT SERPL-MCNC: 0.71 MG/DL (ref 0.5–1.4)
GLOBULIN SER CALC-MCNC: 3.4 G/DL (ref 1.9–3.5)
GLUCOSE SERPL-MCNC: 145 MG/DL (ref 65–99)
POTASSIUM SERPL-SCNC: 3.8 MMOL/L (ref 3.6–5.5)
PROT SERPL-MCNC: 6.7 G/DL (ref 6–8.2)
SODIUM SERPL-SCNC: 135 MMOL/L (ref 135–145)

## 2020-08-01 PROCEDURE — 80053 COMPREHEN METABOLIC PANEL: CPT

## 2020-08-01 PROCEDURE — 700101 HCHG RX REV CODE 250: Performed by: STUDENT IN AN ORGANIZED HEALTH CARE EDUCATION/TRAINING PROGRAM

## 2020-08-01 PROCEDURE — 700102 HCHG RX REV CODE 250 W/ 637 OVERRIDE(OP): Performed by: STUDENT IN AN ORGANIZED HEALTH CARE EDUCATION/TRAINING PROGRAM

## 2020-08-01 PROCEDURE — 36415 COLL VENOUS BLD VENIPUNCTURE: CPT

## 2020-08-01 PROCEDURE — 700111 HCHG RX REV CODE 636 W/ 250 OVERRIDE (IP): Performed by: STUDENT IN AN ORGANIZED HEALTH CARE EDUCATION/TRAINING PROGRAM

## 2020-08-01 PROCEDURE — 96372 THER/PROPH/DIAG INJ SC/IM: CPT

## 2020-08-01 PROCEDURE — A9270 NON-COVERED ITEM OR SERVICE: HCPCS | Performed by: STUDENT IN AN ORGANIZED HEALTH CARE EDUCATION/TRAINING PROGRAM

## 2020-08-01 PROCEDURE — G0378 HOSPITAL OBSERVATION PER HR: HCPCS

## 2020-08-01 PROCEDURE — 99225 PR SUBSEQUENT OBSERVATION CARE,LEVEL II: CPT | Mod: GC | Performed by: HOSPITALIST

## 2020-08-01 RX ADMIN — RIFAMPIN 600 MG: 300 CAPSULE ORAL at 05:02

## 2020-08-01 RX ADMIN — NAPROXEN 500 MG: 500 TABLET ORAL at 08:52

## 2020-08-01 RX ADMIN — ACETAMINOPHEN 1000 MG: 500 TABLET ORAL at 05:01

## 2020-08-01 RX ADMIN — ACETAMINOPHEN 1000 MG: 500 TABLET ORAL at 17:47

## 2020-08-01 RX ADMIN — OMEPRAZOLE 20 MG: 20 CAPSULE, DELAYED RELEASE ORAL at 05:02

## 2020-08-01 RX ADMIN — ENOXAPARIN SODIUM 40 MG: 40 INJECTION SUBCUTANEOUS at 05:02

## 2020-08-01 RX ADMIN — ACETAMINOPHEN 1000 MG: 500 TABLET ORAL at 14:19

## 2020-08-01 RX ADMIN — LIDOCAINE 1 PATCH: 50 PATCH CUTANEOUS at 08:52

## 2020-08-01 RX ADMIN — NAPROXEN 500 MG: 500 TABLET ORAL at 17:47

## 2020-08-01 ASSESSMENT — ENCOUNTER SYMPTOMS
VOMITING: 0
BRUISES/BLEEDS EASILY: 0
SORE THROAT: 0
DEPRESSION: 0
NAUSEA: 0
BACK PAIN: 0
DIAPHORESIS: 0
HEMOPTYSIS: 0
SPUTUM PRODUCTION: 0
SENSORY CHANGE: 0
ABDOMINAL PAIN: 0
DOUBLE VISION: 0
SPEECH CHANGE: 0
BLURRED VISION: 0
FALLS: 0
FOCAL WEAKNESS: 0
FEVER: 0
NECK PAIN: 0
DIZZINESS: 0
PHOTOPHOBIA: 0
BLOOD IN STOOL: 0
CHILLS: 0
CONSTIPATION: 0
PALPITATIONS: 0
COUGH: 0
HEARTBURN: 0
MYALGIAS: 0
HALLUCINATIONS: 0
ORTHOPNEA: 0
HEADACHES: 0
SHORTNESS OF BREATH: 0

## 2020-08-01 ASSESSMENT — PATIENT HEALTH QUESTIONNAIRE - PHQ9
9. THOUGHTS THAT YOU WOULD BE BETTER OFF DEAD, OR OF HURTING YOURSELF: NOT AT ALL
5. POOR APPETITE OR OVEREATING: NOT AT ALL
7. TROUBLE CONCENTRATING ON THINGS, SUCH AS READING THE NEWSPAPER OR WATCHING TELEVISION: NOT AT ALL
2. FEELING DOWN, DEPRESSED, IRRITABLE, OR HOPELESS: NOT AT ALL
6. FEELING BAD ABOUT YOURSELF - OR THAT YOU ARE A FAILURE OR HAVE LET YOURSELF OR YOUR FAMILY DOWN: NOT AL ALL
SUM OF ALL RESPONSES TO PHQ9 QUESTIONS 1 AND 2: 0
8. MOVING OR SPEAKING SO SLOWLY THAT OTHER PEOPLE COULD HAVE NOTICED. OR THE OPPOSITE, BEING SO FIGETY OR RESTLESS THAT YOU HAVE BEEN MOVING AROUND A LOT MORE THAN USUAL: NOT AT ALL
4. FEELING TIRED OR HAVING LITTLE ENERGY: NOT AT ALL
SUM OF ALL RESPONSES TO PHQ QUESTIONS 1-9: 0
1. LITTLE INTEREST OR PLEASURE IN DOING THINGS: NOT AT ALL
3. TROUBLE FALLING OR STAYING ASLEEP OR SLEEPING TOO MUCH: NOT AT ALL

## 2020-08-01 NOTE — THERAPY
Physical Therapy   Daily Treatment     Patient Name: Fede Miller  Age:  59 y.o., Sex:  male  Medical Record #: [id number]  Today's Date: 7/31/2020     Precautions: Fall Risk    Assessment    Patient has made significant progress with skilled PT, demonstrates significant improvement in LE ROM, ambulation tolerance and was able to ambulate stairs with step to pattern (R up, L down) at supervision level with cueing.  Patient will stay on caseload for continued stair training as patient reports not being confident about ambulating stairs.  Patient lives alone, and remains concerned about performing fine motor UE tasks with hand weakness, but states that his legs are significantly improved.  Patient reports ambulating 2-3 times a day around unit with nursing.  At this time, patient from a physical therapy standpoint, is a household ambulator.  However, patient does not appear to have support for groceries, or community tasks.  Will defer to social work to determine.  Additionally, patient appears to have more OT needs than PT needs at this time. Pt will continue to follow to improve patient's confidence with stair ambulation.        07/31/20 0958   Precautions   Precautions Fall Risk   Comments Rwandan speaking, B UE weakness, L knee flexion limitation   Cognition    Cognition / Consciousness WDL   Comments pleasant   Balance   Sitting Balance (Static) Fair +   Sitting Balance (Dynamic) Fair +   Standing Balance (Static) Fair   Standing Balance (Dynamic) Fair   Weight Shift Sitting Fair   Weight Shift Standing Fair   Skilled Intervention Verbal Cuing   Comments FWW   Gait Analysis   Gait Level Of Assist Supervised   Assistive Device Front Wheel Walker   Distance (Feet) 300   # of Times Distance was Traveled 1   Deviation Decreased Base Of Support;Increased Base Of Support   Weight Bearing Status fwb   Comments significant improvement in knee flexion since last visit   Bed Mobility    Supine to Sit Supervised    Sit to Supine Supervised   Scooting Supervised   Functional Mobility   Sit to Stand Supervised   How much difficulty does the patient currently have...   Turning over in bed (including adjusting bedclothes, sheets and blankets)? 4   Sitting down on and standing up from a chair with arms (e.g., wheelchair, bedside commode, etc.) 4   Moving from lying on back to sitting on the side of the bed? 4   How much help from another person does the patient currently need...   Moving to and from a bed to a chair (including a wheelchair)? 4   Need to walk in a hospital room? 4   Climbing 3-5 steps with a railing? 4   6 clicks Mobility Score 24   Short Term Goals    Short Term Goal # 1 pt will perform supine <> sit without bed features with SPV in 6 visits to be able to get in/out of bed at home   Goal Outcome # 1 Goal met   Short Term Goal # 2 pt will perform all functional xfrs with SPV in 6 visits for improved independence   Goal Outcome # 2 Goal met   Short Term Goal # 3 pt will ambulate 150ft with SPV in 6 visits to access home   Goal Outcome # 3 Goal met   Short Term Goal # 4 pt will negotiate FOS with SPV in 6 visits to access home   Goal Outcome # 4 Progressing as expected   Education Group   Education Provided Role of Physical Therapist;Gait Training;Weight Bearing Precautions   Role of Physical Therapist Patient Response Patient;Acceptance;Explanation;Verbal Demonstration   Gait Training Patient Response Patient;Acceptance;Explanation;Verbal Demonstration   Anticipated Discharge Equipment   DC Equipment Front-Wheel Walker   Interdisciplinary Plan of Care Collaboration   IDT Collaboration with  Nursing   Patient Position at End of Therapy In Bed;Call Light within Reach;Tray Table within Reach;Phone within Reach   Collaboration Comments results of PT       Plan    Treatment plan modified to 3 times per week until therapy goals are met for the following treatments:  Bed Mobility, Community Re-integration, Gait Training,  Neuro Re-Education / Balance, Self Care/Home Evaluation, Stair Training, Therapeutic Activities and Therapeutic Exercises.    Discharge recommendations:  Defer to occupation therapy, as patient has concerns with UE fine motor tasks secondary to UE limitations.  At this time, patient can go home from a physical therapy standpoint if he has assist from family or services for groceries.

## 2020-08-01 NOTE — CARE PLAN
Problem: Pain Management  Goal: Pain level will decrease to patient's comfort goal  Outcome: MET     Problem: Psychosocial Needs:  Goal: Level of anxiety will decrease  Outcome: MET

## 2020-08-01 NOTE — DISCHARGE PLANNING
Agency/Facility Name: [facility]  Outcome: Left voice message for admissions in regards to status of authorization with workers comp. Requested a call back.       Update @ 1020: CCA received voice message from Linn @ [facility], authorization with workers comp remains pending. Will update this CCA if anything changes.

## 2020-08-01 NOTE — PROGRESS NOTES
A/o, respirations are even and unlabored on room air ,assessment completed, vital signs stable, lidocaine patch in place both arms, still complaining of pain,  updated communication board,  poc discussed and understood, verbalized understanding, all questions answered at this time , fall precautions in place, call button within reach, will continue to monitor

## 2020-08-02 PROCEDURE — 700102 HCHG RX REV CODE 250 W/ 637 OVERRIDE(OP): Performed by: STUDENT IN AN ORGANIZED HEALTH CARE EDUCATION/TRAINING PROGRAM

## 2020-08-02 PROCEDURE — G0378 HOSPITAL OBSERVATION PER HR: HCPCS

## 2020-08-02 PROCEDURE — 700101 HCHG RX REV CODE 250: Performed by: STUDENT IN AN ORGANIZED HEALTH CARE EDUCATION/TRAINING PROGRAM

## 2020-08-02 PROCEDURE — 99225 PR SUBSEQUENT OBSERVATION CARE,LEVEL II: CPT | Mod: GC | Performed by: HOSPITALIST

## 2020-08-02 PROCEDURE — A9270 NON-COVERED ITEM OR SERVICE: HCPCS | Performed by: STUDENT IN AN ORGANIZED HEALTH CARE EDUCATION/TRAINING PROGRAM

## 2020-08-02 RX ADMIN — ACETAMINOPHEN 1000 MG: 500 TABLET ORAL at 04:51

## 2020-08-02 RX ADMIN — ACETAMINOPHEN 1000 MG: 500 TABLET ORAL at 13:17

## 2020-08-02 RX ADMIN — NAPROXEN 500 MG: 500 TABLET ORAL at 08:56

## 2020-08-02 RX ADMIN — NAPROXEN 500 MG: 500 TABLET ORAL at 18:00

## 2020-08-02 RX ADMIN — OMEPRAZOLE 20 MG: 20 CAPSULE, DELAYED RELEASE ORAL at 04:51

## 2020-08-02 RX ADMIN — LIDOCAINE 1 PATCH: 50 PATCH CUTANEOUS at 08:57

## 2020-08-02 RX ADMIN — ACETAMINOPHEN 1000 MG: 500 TABLET ORAL at 18:00

## 2020-08-02 NOTE — PROGRESS NOTES
Daily Progress Note:     Date of Service: 8/2/2020  Primary Team: [facility] Green Team   Attending: Yash Calderon M.D.   Senior Resident: Dr. Starr  Intern: Dr. Marinelli  Contact:  [phone]    Chief Complaint:   Polyarthropathy  Latent TB  Gout     Subjective:  No acute events overnight reported.  Patient's functioning significantly improved, but not good enough for home transfer since he lives alone and had almost 20 high and steep steps to climb at home.  Patient stable, pain and swelling improved and some joints resolved.  Constipation resolved, has had 3-4 BM now.  Patient will be discharge to SNF once workers comp approves it.  He will follow up outpatient with ID and Rheum.    Consultants/Specialty:    ID  Orthopaedic     Review of Systems:      ROS     Review of Systems   Constitutional: Negative for chills, diaphoresis, fever and malaise/fatigue.   HENT: Negative for congestion, ear discharge, hearing loss, sore throat and tinnitus.    Eyes: Negative for blurred vision, double vision and photophobia.   Respiratory: Negative for cough, hemoptysis, sputum production and shortness of breath.    Cardiovascular: Negative for chest pain, palpitations, orthopnea and leg swelling.   Gastrointestinal: Negative for abdominal pain, blood in stool, constipation, heartburn, melena, nausea and vomiting.   Genitourinary: Negative for dysuria, frequency, hematuria and urgency.   Musculoskeletal: Positive for joint pain. Negative for back pain, falls, myalgias and neck pain.   Skin: Negative for itching and rash.   Neurological: Negative for dizziness, sensory change, speech change, focal weakness and headaches.   Endo/Heme/Allergies: Negative for environmental allergies. Does not bruise/bleed easily.   Psychiatric/Behavioral: Negative for depression, hallucinations and suicidal ideas.         Objective Data:   Physical Exam:   Vitals:   Temp:  [36.1 °C (97 °F)-36.9 °C (98.5 °F)] 36.9 °C (98.5 °F)  Pulse:  [55-74]  61  Resp:  [15-16] 16  BP: (122-157)/(72-94) 122/72  SpO2:  [95 %-98 %] 95 %     Physical Exam    Physical Exam  Vitals signs reviewed.   Constitutional:       General: He is not in acute distress.     Appearance: Normal appearance. He is not ill-appearing or diaphoretic.   HENT:      Head: Normocephalic and atraumatic.      Nose: Nose normal. No congestion or rhinorrhea.      Mouth/Throat:      Mouth: Mucous membranes are moist.      Pharynx: Oropharynx is clear. No oropharyngeal exudate or posterior oropharyngeal erythema.   Eyes:      General: No scleral icterus.     Extraocular Movements: Extraocular movements intact.      Conjunctiva/sclera: Conjunctivae normal.      Pupils: Pupils are equal, round, and reactive to light.   Neck:      Musculoskeletal: Normal range of motion and neck supple. No neck rigidity or muscular tenderness.   Cardiovascular:      Rate and Rhythm: Normal rate and regular rhythm.      Pulses: Normal pulses.      Heart sounds: Normal heart sounds. No murmur. No gallop.    Pulmonary:      Effort: Pulmonary effort is normal. No respiratory distress.      Breath sounds: Normal breath sounds. No wheezing, rhonchi or rales.   Abdominal:      General: Bowel sounds are normal. There is no distension.      Palpations: Abdomen is soft.      Tenderness: There is no abdominal tenderness. There is no guarding or rebound.   Musculoskeletal:         General: No swelling or tenderness.      Right lower leg: No edema.      Left lower leg: No edema.      Comments: Feet and ankle joints normal ROM, no swelling noticed, no pain.  Right knee back to normal, baseline ROM.  Left knee significant improvement in his ROM, mild pain and mild swelling, but now able to ambulate without assistance, uses a walker.  Wrists mild swelling on his right, moderate in his left, moderate reduced ROM.  Fingers moderate swelling, mild improvement, worse fingers are 3rd and 4th left hand.   Skin:     General: Skin is warm.       Capillary Refill: Capillary refill takes less than 2 seconds.      Coloration: Skin is not jaundiced.      Findings: No erythema or rash.   Neurological:      General: No focal deficit present.      Mental Status: He is alert and oriented to person, place, and time. Mental status is at baseline.      Cranial Nerves: No cranial nerve deficit.   Psychiatric:         Mood and Affect: Mood normal.         Behavior: Behavior normal.         Thought Content: Thought content normal.         Labs:   Review    Imaging:   Review    Problem Representation:     Polyarthralgia with peripheral swelling  Assessment & Plan  - No previous Hx of Rheumatologic disease  - Denied family history of Rheumatoid conditions  - Patient stated that in the past he has had Joint swelling and pain, but he has been able to tolerate it and resolve with OTC NSAID's  - This is the first time it get very severe and first time that this many joints are involved  - Ankles, right knee and feet resolved pain and swelling.  - 7/30 Left Knee improved, patient now walking on hallways on his own, still has reduced ROM, but much better.  - Wrists and fingers continue to  Improve, left wrists still swelling more evident on his left side, ROM worse as well.  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    - VS at ED unremarkable  - Imaging knees showed mild degenerative disease  - Admission Labs: Cbc- 17.9 wbc, hb 12.6, Crp-12.61 (elevated) on, Esr-elevated at 56, Uric acid- 4.6  - ID consulted 7/27  - 8/2 Continues to improve in functioning and mobility, but not enough to go back home since he lives alone and must climb close to 20 high steep steps.  - CM working on SNF transfer, likely to occur till next week.   - Workers comp pending approval for SNF  - No  updates per CM for the weekend                       PLAN:  - Transfer to Medical status since might not get DC till next week  - Continue same management  - Cultures NGTD  - Autoimmune workup negative  - Pain management-   - Rifampin and Doxy started to cover Brucellosis   - ID ordered: Lyme titers negative, Blsto and Histoplasmosis serology negative, RPR negative  - CTM  - ID follow up 2 weeks after DC  - Encourage ambulation if possible  - DC to [facility] when approved  - Follow up outpatient with Rheum        Elevated liver enzymes  Assessment & Plan  - Normal liver enzymes on admission  - CMP 7/30 no significant changes, AST:64, ALT:162  - Liver enzymes back to Normal limits on 8/1  - CTM             PLAN:  - Continue monitoring   - No need for daily CMP  - After DC will follow with Rheum and ID      Latent tuberculosis  Assessment & Plan  - Positive quantiferon, lived in [place] until 15 years ago.  - Never treated or told he has abnormal tuberculosis testing previously.  - ID recommended Rifampin 4 months and stop doxy  - Follow up outpatient in 2 weeks post DC with ID    Hypokalemia  Assessment & Plan  - Resolved  - Patient eating well  - K 3.7 on admission  - 8/1 3.8  - CTM and replete if needed    Constipation  Assessment & Plan  - Resolved  - Pt has had now 3-4 more BM since given suppository    Acute gout of knee- (present on admission)  Assessment & Plan  - Arthrocentesis showed few monosodium urate crystals  - Hx of monoarthritis in the past for about 10-12 years, not severe stating that never went to the Dr for this type of pain, it would resolve in 1-2 days with OTC NSAID's  - This presentation was the first time with Polyarthropathy   - We are treating as acute gout as well.  - Patient will need outpatient Rheum follow up   - Also needs to establish with a PCP  - Continues to improve in mobility, now walking independently outside his room with walker.  - Patient was able to climbed few stairs with PT on  7/31, but patient stated that at home he has to climb close to 20 steps and home steps are double in height and he feels not capable to do so.  - Workers comp pending approval in order to be DC to SNF

## 2020-08-02 NOTE — PROGRESS NOTES
Rounded pt, vital signs table, pt still complaining of joint pain, no swelling noted, will continue to monitor

## 2020-08-02 NOTE — CARE PLAN
Problem: Safety  Goal: Will remain free from falls  Outcome: PROGRESSING AS EXPECTED   Pt instructed on use of call light. Call light and personal belongings within reach of pt. Pt denies any needs at this time. Will continue to monitor.

## 2020-08-03 PROCEDURE — 700102 HCHG RX REV CODE 250 W/ 637 OVERRIDE(OP): Performed by: STUDENT IN AN ORGANIZED HEALTH CARE EDUCATION/TRAINING PROGRAM

## 2020-08-03 PROCEDURE — A9270 NON-COVERED ITEM OR SERVICE: HCPCS | Performed by: STUDENT IN AN ORGANIZED HEALTH CARE EDUCATION/TRAINING PROGRAM

## 2020-08-03 PROCEDURE — 700111 HCHG RX REV CODE 636 W/ 250 OVERRIDE (IP): Performed by: STUDENT IN AN ORGANIZED HEALTH CARE EDUCATION/TRAINING PROGRAM

## 2020-08-03 PROCEDURE — 99225 PR SUBSEQUENT OBSERVATION CARE,LEVEL II: CPT | Mod: GC | Performed by: HOSPITALIST

## 2020-08-03 PROCEDURE — 96372 THER/PROPH/DIAG INJ SC/IM: CPT

## 2020-08-03 PROCEDURE — 700101 HCHG RX REV CODE 250: Performed by: STUDENT IN AN ORGANIZED HEALTH CARE EDUCATION/TRAINING PROGRAM

## 2020-08-03 PROCEDURE — 97530 THERAPEUTIC ACTIVITIES: CPT

## 2020-08-03 PROCEDURE — G0378 HOSPITAL OBSERVATION PER HR: HCPCS

## 2020-08-03 RX ORDER — RIFAMPIN 300 MG/1
600 CAPSULE ORAL DAILY
Status: DISCONTINUED | OUTPATIENT
Start: 2020-08-03 | End: 2020-08-07 | Stop reason: HOSPADM

## 2020-08-03 RX ADMIN — POLYETHYLENE GLYCOL 3350 1 PACKET: 17 POWDER, FOR SOLUTION ORAL at 04:57

## 2020-08-03 RX ADMIN — RIFAMPIN 600 MG: 300 CAPSULE ORAL at 09:22

## 2020-08-03 RX ADMIN — OMEPRAZOLE 20 MG: 20 CAPSULE, DELAYED RELEASE ORAL at 04:56

## 2020-08-03 RX ADMIN — NAPROXEN 500 MG: 500 TABLET ORAL at 17:59

## 2020-08-03 RX ADMIN — ACETAMINOPHEN 1000 MG: 500 TABLET ORAL at 04:56

## 2020-08-03 RX ADMIN — ENOXAPARIN SODIUM 40 MG: 40 INJECTION SUBCUTANEOUS at 04:58

## 2020-08-03 RX ADMIN — ACETAMINOPHEN 1000 MG: 500 TABLET ORAL at 17:59

## 2020-08-03 RX ADMIN — NAPROXEN 500 MG: 500 TABLET ORAL at 09:22

## 2020-08-03 RX ADMIN — LIDOCAINE 1 PATCH: 50 PATCH CUTANEOUS at 18:01

## 2020-08-03 ASSESSMENT — COGNITIVE AND FUNCTIONAL STATUS - GENERAL
TOILETING: A LITTLE
DRESSING REGULAR LOWER BODY CLOTHING: A LOT
PERSONAL GROOMING: A LITTLE
DAILY ACTIVITIY SCORE: 17
HELP NEEDED FOR BATHING: A LITTLE
DRESSING REGULAR UPPER BODY CLOTHING: A LITTLE
EATING MEALS: A LITTLE
SUGGESTED CMS G CODE MODIFIER DAILY ACTIVITY: CK

## 2020-08-03 NOTE — PROGRESS NOTES
Daily Progress Note:     Date of Service: 8/3/2020  Primary Team: [facility] Green Team   Attending: Yash Calderon M.D.   Senior Resident: Dr. Starr  Intern: Dr. Marinelli  Contact:  [phone]    Chief Complaint:   Polyarthropathy  Latent TB  Gout      Subjective:   No acute events overnight.  Patient continues to improve daily.  He states more mobility day after day, worse joints are left wrist and fingers, and also left knee, but improving significantly.  Patient denies any other symptoms than pain. No SOB, palpitations, chest pain, abdominal or urinary symptoms.  Patient still continues to have a hard time feeding himself due to severe reduced ROM on his hands.  CM working on his placement to SNF, still pending approval from workers comp. Today's update 8/3 unchanged.  I spoke directly with a patient's close friend and family member to try to arrange potential help at home or at friend's /family house, but unable to meet needs from patient.    Consultants/Specialty:  ID  Orthopaedic    Review of Systems:      ROS      Review of Systems   Constitutional: Negative for chills, diaphoresis, fever and malaise/fatigue.   HENT: Negative for congestion, ear discharge, hearing loss, sore throat and tinnitus.    Eyes: Negative for blurred vision, double vision and photophobia.   Respiratory: Negative for cough, hemoptysis, sputum production and shortness of breath.    Cardiovascular: Negative for chest pain, palpitations, orthopnea and leg swelling.   Gastrointestinal: Negative for abdominal pain, blood in stool, constipation, heartburn, melena, nausea and vomiting.   Genitourinary: Negative for dysuria, frequency, hematuria and urgency.   Musculoskeletal: Positive for joint pain. Negative for back pain, falls, myalgias and neck pain.   Skin: Negative for itching and rash.   Neurological: Negative for dizziness, sensory change, speech change, focal weakness and headaches.   Endo/Heme/Allergies: Negative  for environmental allergies. Does not bruise/bleed easily.   Psychiatric/Behavioral: Negative for depression, hallucinations and suicidal ideas.         Objective Data:   Physical Exam:   Vitals:   Temp:  [36.3 °C (97.4 °F)-37 °C (98.6 °F)] 36.3 °C (97.4 °F)  Pulse:  [57-74] 74  Resp:  [14-18] 18  BP: (120-138)/(78-85) 133/80  SpO2:  [94 %-98 %] 97 %     Physical Exam    Physical Exam  Vitals signs reviewed.   Constitutional:       General: He is not in acute distress.     Appearance: Normal appearance. He is not ill-appearing or diaphoretic.   HENT:      Head: Normocephalic and atraumatic.      Nose: Nose normal. No congestion or rhinorrhea.      Mouth/Throat:      Mouth: Mucous membranes are moist.      Pharynx: Oropharynx is clear. No oropharyngeal exudate or posterior oropharyngeal erythema.   Eyes:      General: No scleral icterus.     Extraocular Movements: Extraocular movements intact.      Conjunctiva/sclera: Conjunctivae normal.      Pupils: Pupils are equal, round, and reactive to light.   Neck:      Musculoskeletal: Normal range of motion and neck supple. No neck rigidity or muscular tenderness.   Cardiovascular:      Rate and Rhythm: Normal rate and regular rhythm.      Pulses: Normal pulses.      Heart sounds: Normal heart sounds. No murmur. No gallop.    Pulmonary:      Effort: Pulmonary effort is normal. No respiratory distress.      Breath sounds: Normal breath sounds. No wheezing, rhonchi or rales.   Abdominal:      General: Bowel sounds are normal. There is no distension.      Palpations: Abdomen is soft.      Tenderness: There is no abdominal tenderness. There is no guarding or rebound.   Musculoskeletal:         General: No swelling or tenderness.      Right lower leg: No edema.      Left lower leg: No edema.      Comments: Feet and ankle joints normal ROM, no swelling noticed, no pain.  Right knee back to normal, baseline ROM.  Left knee significant improvement in his ROM, mild pain and mild  swelling, but now able to ambulate without assistance, uses a walker.  Wrists mild swelling on his right, moderate in his left hand with moderate reduced ROM.  Fingers moderate swelling, mild improvement, worse fingers are 3rd and 4th left hand.   Skin:     General: Skin is warm.      Capillary Refill: Capillary refill takes less than 2 seconds.      Coloration: Skin is not jaundiced.      Findings: No erythema or rash.   Neurological:      General: No focal deficit present.      Mental Status: He is alert and oriented to person, place, and time. Mental status is at baseline.      Cranial Nerves: No cranial nerve deficit.   Psychiatric:         Mood and Affect: Mood normal.         Behavior: Behavior normal.         Thought Content: Thought content normal.      Labs:   Review    Imaging:   Review    Problem Representation:       Polyarthralgia with peripheral swelling  Assessment & Plan  - No previous Hx of Rheumatologic disease  - Denied family history of Rheumatoid conditions  - Patient stated that in the past he has had Joint swelling and pain, but he has been able to tolerate it and resolve with OTC NSAID's  - This is the first time it get very severe and first time that this many joints are involved  - Ankles, right knee and feet resolved pain and swelling.  - 7/30 Left Knee improved, patient now walking on hallways on his own, still has reduced ROM, but much better.  - Wrists and fingers continue to  Improve, left wrists still swelling more evident on his left side, ROM worse as well.  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    - VS at ED unremarkable  - Imaging knees showed mild degenerative disease  - Admission Labs: Cbc- 17.9 wbc, hb 12.6, Crp-12.61 (elevated) on, Esr-elevated at 56, Uric acid- 4.6  - ID  consulted 7/27  - 8/3 Continues to improve in functioning and mobility,.           Patient not well enough to sent home to perform all activities of daily living, severe ROM limitation on hands and moderate on left knee.  - CM working on SNF transfer, likely to occur till next week.   - Workers comp pending approval for SNF  - No updates per CM for the weekend                       PLAN:  - Transfer to Medical status since might not get DC till next week  - Continue same management  - Cultures NGTD  - Autoimmune workup negative  - Pain management-   - Rifampin and Doxy started to cover Brucellosis   - ID ordered: Lyme titers negative, Blsto and Histoplasmosis serology negative, RPR negative  - CTM  - ID follow up 2 weeks after DC  - Encourage ambulation if possible  - DC to [facility] when approved  - Follow up outpatient with Rheum  - Workers comp pending SNF approval      Elevated liver enzymes  Assessment & Plan  - Normal liver enzymes on admission  - CMP 7/30 no significant changes, AST:64, ALT:162  - Liver enzymes back to Normal limits on 8/1  - CTM             PLAN:  - Continue monitoring   - No need for daily CMP  - After DC will follow with Rheum and ID  - Awaiting workers comp approval      Latent tuberculosis  Assessment & Plan  - Positive quantiferon, lived in [place] until 15 years ago.  - Never treated or told he has abnormal tuberculosis testing previously.  - ID recommended Rifampin 4 months and stop doxy  - Follow up outpatient in 2 weeks post DC with ID  - Patient tolerating well drug    Hypokalemia  Assessment & Plan  - Resolved  - Patient eating well  - K 3.7 on admission  - 8/1 3.8  - CTM and replete if needed    Constipation  Assessment & Plan  - Resolved  - Pt has had now 3-4 more BM since given suppository    Acute gout of knee- (present on admission)  Assessment & Plan  - Arthrocentesis showed few monosodium urate crystals  - Hx of monoarthritis in the past for about 10-12 years, not severe stating  that never went to the Dr for this type of pain, it would resolve in 1-2 days with OTC NSAID's  - This presentation was the first time with Polyarthropathy   - We are treating as acute gout as well.  - Patient will need outpatient Rheum follow up   - Also needs to establish with a PCP  - Continues to improve in mobility, now walking independently outside his room with walker.  - Patient was able to climbed few stairs with PT on 7/31, but patient stated that at home he has to climb close to 20 steps and home steps are double in height and he feels not capable to do so.  - Workers comp pending approval in order to be DC to SNF  - CM no new updates

## 2020-08-03 NOTE — DISCHARGE PLANNING
Agency/Facility Name: [facility]  Spoke To: Milagro  Outcome: Auth from workers comp is still pending. Will call this CCA back with updates.    Milagro was told that the worker's comp , Estefany, assigned to this pt has suffered an injury and will not be back to work until mid-week. It is unclear if another  will step in or if the process will be on hold until her return.

## 2020-08-03 NOTE — PROGRESS NOTES
Pt stable, vital signs stable,  hand pain better compare yesterday, pt aware still waiting for placement, will continue to monitor

## 2020-08-03 NOTE — PROGRESS NOTES
MD at bedside informed RN that family available to possibly assist in care at home, Case management informed by MD.

## 2020-08-04 LAB
ALBUMIN SERPL BCP-MCNC: 3.8 G/DL (ref 3.2–4.9)
ALBUMIN/GLOB SERPL: 1 G/DL
ALP SERPL-CCNC: 122 U/L (ref 30–99)
ALT SERPL-CCNC: 30 U/L (ref 2–50)
ANION GAP SERPL CALC-SCNC: 15 MMOL/L (ref 7–16)
AST SERPL-CCNC: 15 U/L (ref 12–45)
BILIRUB SERPL-MCNC: 0.2 MG/DL (ref 0.1–1.5)
BUN SERPL-MCNC: 19 MG/DL (ref 8–22)
CALCIUM SERPL-MCNC: 9.3 MG/DL (ref 8.5–10.5)
CHLORIDE SERPL-SCNC: 103 MMOL/L (ref 96–112)
CO2 SERPL-SCNC: 20 MMOL/L (ref 20–33)
CREAT SERPL-MCNC: 0.6 MG/DL (ref 0.5–1.4)
GLOBULIN SER CALC-MCNC: 3.7 G/DL (ref 1.9–3.5)
GLUCOSE SERPL-MCNC: 104 MG/DL (ref 65–99)
POTASSIUM SERPL-SCNC: 4.1 MMOL/L (ref 3.6–5.5)
PROT SERPL-MCNC: 7.5 G/DL (ref 6–8.2)
SODIUM SERPL-SCNC: 138 MMOL/L (ref 135–145)

## 2020-08-04 PROCEDURE — A9270 NON-COVERED ITEM OR SERVICE: HCPCS | Performed by: STUDENT IN AN ORGANIZED HEALTH CARE EDUCATION/TRAINING PROGRAM

## 2020-08-04 PROCEDURE — 80053 COMPREHEN METABOLIC PANEL: CPT

## 2020-08-04 PROCEDURE — 96372 THER/PROPH/DIAG INJ SC/IM: CPT

## 2020-08-04 PROCEDURE — 99225 PR SUBSEQUENT OBSERVATION CARE,LEVEL II: CPT | Mod: GC | Performed by: HOSPITALIST

## 2020-08-04 PROCEDURE — 700111 HCHG RX REV CODE 636 W/ 250 OVERRIDE (IP): Performed by: STUDENT IN AN ORGANIZED HEALTH CARE EDUCATION/TRAINING PROGRAM

## 2020-08-04 PROCEDURE — 97110 THERAPEUTIC EXERCISES: CPT

## 2020-08-04 PROCEDURE — 97535 SELF CARE MNGMENT TRAINING: CPT

## 2020-08-04 PROCEDURE — 97116 GAIT TRAINING THERAPY: CPT

## 2020-08-04 PROCEDURE — G0378 HOSPITAL OBSERVATION PER HR: HCPCS

## 2020-08-04 PROCEDURE — 700102 HCHG RX REV CODE 250 W/ 637 OVERRIDE(OP): Performed by: STUDENT IN AN ORGANIZED HEALTH CARE EDUCATION/TRAINING PROGRAM

## 2020-08-04 PROCEDURE — 700101 HCHG RX REV CODE 250: Performed by: STUDENT IN AN ORGANIZED HEALTH CARE EDUCATION/TRAINING PROGRAM

## 2020-08-04 RX ORDER — IBUPROFEN 200 MG
200 TABLET ORAL 2 TIMES DAILY
Status: DISCONTINUED | OUTPATIENT
Start: 2020-08-04 | End: 2020-08-04

## 2020-08-04 RX ADMIN — ACETAMINOPHEN 1000 MG: 500 TABLET ORAL at 05:30

## 2020-08-04 RX ADMIN — OMEPRAZOLE 20 MG: 20 CAPSULE, DELAYED RELEASE ORAL at 05:30

## 2020-08-04 RX ADMIN — ACETAMINOPHEN 1000 MG: 500 TABLET ORAL at 18:47

## 2020-08-04 RX ADMIN — ENOXAPARIN SODIUM 40 MG: 40 INJECTION SUBCUTANEOUS at 05:30

## 2020-08-04 RX ADMIN — LIDOCAINE 1 PATCH: 50 PATCH CUTANEOUS at 10:19

## 2020-08-04 RX ADMIN — RIFAMPIN 600 MG: 300 CAPSULE ORAL at 05:27

## 2020-08-04 RX ADMIN — ACETAMINOPHEN 1000 MG: 500 TABLET ORAL at 10:19

## 2020-08-04 RX ADMIN — NAPROXEN 500 MG: 500 TABLET ORAL at 20:53

## 2020-08-04 RX ADMIN — NAPROXEN 500 MG: 500 TABLET ORAL at 10:18

## 2020-08-04 RX ADMIN — POLYETHYLENE GLYCOL 3350 1 PACKET: 17 POWDER, FOR SOLUTION ORAL at 05:26

## 2020-08-04 ASSESSMENT — ENCOUNTER SYMPTOMS
MYALGIAS: 0
PHOTOPHOBIA: 0
CHILLS: 0
SORE THROAT: 0
PALPITATIONS: 0
SENSORY CHANGE: 0
TINGLING: 0
VOMITING: 0
NECK PAIN: 0
DOUBLE VISION: 0
COUGH: 0
BACK PAIN: 0
CLAUDICATION: 0
NAUSEA: 0
HEADACHES: 0
DIZZINESS: 0
DIAPHORESIS: 0
DEPRESSION: 0
HALLUCINATIONS: 0
SHORTNESS OF BREATH: 0
FOCAL WEAKNESS: 0
CONSTIPATION: 0
BRUISES/BLEEDS EASILY: 0
BLOOD IN STOOL: 0
SPUTUM PRODUCTION: 0
HEMOPTYSIS: 0
HEARTBURN: 0
ORTHOPNEA: 0
FEVER: 0
BLURRED VISION: 0
ABDOMINAL PAIN: 0

## 2020-08-04 ASSESSMENT — COGNITIVE AND FUNCTIONAL STATUS - GENERAL
TURNING FROM BACK TO SIDE WHILE IN FLAT BAD: A LITTLE
HELP NEEDED FOR BATHING: A LITTLE
DAILY ACTIVITIY SCORE: 17
PERSONAL GROOMING: A LITTLE
MOVING FROM LYING ON BACK TO SITTING ON SIDE OF FLAT BED: A LITTLE
MOBILITY SCORE: 20
DRESSING REGULAR LOWER BODY CLOTHING: A LOT
TOILETING: A LITTLE
SUGGESTED CMS G CODE MODIFIER DAILY ACTIVITY: CK
MOVING TO AND FROM BED TO CHAIR: A LITTLE
CLIMB 3 TO 5 STEPS WITH RAILING: A LITTLE
DRESSING REGULAR UPPER BODY CLOTHING: A LITTLE
EATING MEALS: A LITTLE
SUGGESTED CMS G CODE MODIFIER MOBILITY: CJ

## 2020-08-04 ASSESSMENT — GAIT ASSESSMENTS
GAIT LEVEL OF ASSIST: SUPERVISED
DEVIATION: INCREASED BASE OF SUPPORT;BRADYKINETIC

## 2020-08-04 NOTE — THERAPY
"Occupational Therapy  Daily Treatment     Patient Name: Fede Miller  Age:  59 y.o., Sex:  male  Medical Record #: [id number]  Today's Date: 8/3/2020     Precautions  Precautions: Fall Risk  Comments: Persian speaking, B UE weakness, L knee flexion limitation    Assessment    Patient seen for session to focus on UEs today. His hands are still swollen at this joints, however many of them have improved. He has a lot more ROM in R hand, although his MCP joint of 3rd finger remains very swollen and difficult to range, as well as R pinky DIP joint. His left hand is swollen throughout and he demos improved, yet still very limited ROM. Presented patient with tendon gliding exercises which he practiced multiple times. He opposition looks pretty good and he attempted isolated joint movements, with good outcome and limited pain. Will follow up with patient this week.     Plan    Continue current treatment plan.    Discharge recommendations:  Patient may be able to DC home with outpatient hand therapy.     Subjective    \"It hurts when I force them.\"     Objective       08/03/20 1028   Short Term Goals   Short Term Goal # 1 Pt will complete toilet xfer supv   Goal Outcome # 1 Progressing as expected   Short Term Goal # 2 Pt will complete LB dressing supv   Goal Outcome # 2 Progressing slower than expected   Short Term Goal # 3 Pt will complete G/H (brushing teeth) supv   Goal Outcome # 3 Progressing as expected         "

## 2020-08-04 NOTE — PROGRESS NOTES
Daily Progress Note:     Date of Service: 8/4/2020  Primary Team: [facility] IM Green Team   Attending: Yash Calderon M.D.   Senior Resident: Dr. Starr  Intern: Dr. Marinelli  Contact:  [phone]    Chief Complaint:   Polyarthropathy  Latent TB  Gout      Subjective:  No acute events overnight reported by personnel.  Patient states daily improvement in his ambulation, but very small improvement in his left hand swelling and functioning.  I discussed possibility to obtain direct help from a friend and a family member, but it was not ideal since they also have medical problems in their own, they live far and also they have jaylen other commitments.  8/4 CM spoke to Bailey serrano, requesting to fax MD's SNF referral, awaiting response.  We will continue to assess his functionality daily for possible home discharge when appropriate.    Consultants/Specialty:  ID  Orthopaedic    Review of Systems:      Review of Systems   Constitutional: Negative for chills, diaphoresis, fever and malaise/fatigue.   HENT: Negative for congestion, hearing loss, nosebleeds, sore throat and tinnitus.    Eyes: Negative for blurred vision, double vision and photophobia.   Respiratory: Negative for cough, hemoptysis, sputum production and shortness of breath.    Cardiovascular: Negative for chest pain, palpitations, orthopnea, claudication and leg swelling.   Gastrointestinal: Negative for abdominal pain, blood in stool, constipation, heartburn, melena, nausea and vomiting.   Genitourinary: Negative for dysuria, frequency, hematuria and urgency.   Musculoskeletal: Positive for joint pain. Negative for back pain, myalgias and neck pain.   Skin: Negative for itching and rash.   Neurological: Negative for dizziness, tingling, sensory change, focal weakness and headaches.   Endo/Heme/Allergies: Negative for environmental allergies. Does not bruise/bleed easily.   Psychiatric/Behavioral: Negative for depression, hallucinations and suicidal  ideas.       Objective Data:   Physical Exam:   Vitals:   Temp:  [36.1 °C (97 °F)-36.8 °C (98.2 °F)] 36.1 °C (97 °F)  Pulse:  [57-74] 60  Resp:  [16-18] 16  BP: (115-134)/(75-85) 115/75  SpO2:  [94 %-97 %] 96 %    Physical Exam  Vitals signs reviewed.   Constitutional:       General: He is not in acute distress.     Appearance: Normal appearance. He is not ill-appearing.   HENT:      Head: Normocephalic and atraumatic.      Right Ear: There is no impacted cerumen.      Left Ear: There is no impacted cerumen.      Nose: Nose normal. No congestion or rhinorrhea.      Mouth/Throat:      Mouth: Mucous membranes are moist.      Pharynx: Oropharynx is clear. No oropharyngeal exudate or posterior oropharyngeal erythema.   Eyes:      General: No scleral icterus.     Extraocular Movements: Extraocular movements intact.      Conjunctiva/sclera: Conjunctivae normal.      Pupils: Pupils are equal, round, and reactive to light.   Neck:      Musculoskeletal: Normal range of motion and neck supple. No neck rigidity or muscular tenderness.   Cardiovascular:      Rate and Rhythm: Normal rate and regular rhythm.      Pulses: Normal pulses.      Heart sounds: Normal heart sounds. No murmur.   Pulmonary:      Effort: Pulmonary effort is normal. No respiratory distress.      Breath sounds: Normal breath sounds. No wheezing or rhonchi.   Abdominal:      General: Bowel sounds are normal. There is no distension.      Palpations: Abdomen is soft. There is no mass.      Tenderness: There is no abdominal tenderness. There is no guarding or rebound.   Musculoskeletal:         General: Swelling, tenderness and deformity present.      Right lower leg: No edema.      Left lower leg: No edema.      Comments: Feet and ankle joints normal ROM, no swelling noticed, no pain.  Right knee back to normal, baseline ROM, no swelling.  Left knee significant improvement in his ROM, mild pain and mild swelling, but able to ambulate without assistance, needs  of walking devise at times.  Wrists mild swelling on his right, moderate/severe swelling in his left hand with moderate reduced ROM.  Fingers moderate swelling, mild improvement, worse fingers are 3rd and 4th left hand.    Skin:     General: Skin is warm.      Capillary Refill: Capillary refill takes less than 2 seconds.      Coloration: Skin is not jaundiced.      Findings: No bruising or erythema.   Neurological:      General: No focal deficit present.      Mental Status: He is alert and oriented to person, place, and time. Mental status is at baseline.      Cranial Nerves: No cranial nerve deficit.   Psychiatric:         Mood and Affect: Mood normal.         Behavior: Behavior normal.         Thought Content: Thought content normal.           Labs:   Review    Imaging:   Review    Problem Representation:       Polyarthralgia with peripheral swelling  Assessment & Plan  - No previous Hx of Rheumatologic disease  - Denied family history of Rheumatoid conditions  - Patient stated that in the past he has had Joint swelling and pain, but he has been able to tolerate it and resolve with OTC NSAID's  - This is the first time it get very severe and first time that this many joints are involved  - Ankles, right knee and feet resolved pain and swelling.  - 7/30 Left Knee improved, patient now walking on hallways on his own, still has reduced ROM, but much better.  - Wrists and fingers continue to  Improve, left wrists still swelling more evident on his left side, ROM worse as well.  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    - VS at ED unremarkable  - Imaging knees showed mild degenerative disease  - Admission Labs: Cbc- 17.9 wbc, hb 12.6, Crp-12.61 (elevated) on, Esr-elevated at 56, Uric acid- 4.6  - ID consulted 7/27  -  8/4 Continues to improve in functioning and mobility on his left knee. Hands moderate/severe moderate/severe         swelling and reduced ROM.           Patient not well enough to sent home to perform all activities of daily living, severe ROM limitation on hands and moderate on left knee.  - CM working on SNF transfer, likely to occur till next week.   - Workers comp pending approval for SNF  - No updates per CM for the weekend                       PLAN:  - Transfer to Medical status since might not get DC till next week  - Continue same management  - Cultures NGTD  - Autoimmune workup negative  - Pain management-   - Rifampin and Doxy started to cover Brucellosis   - ID ordered: Lyme titers negative, Blsto and Histoplasmosis serology negative, RPR negative  - CTM  - ID follow up 2 weeks after DC  - Encourage ambulation if possible  - DC to [facility] when approved  - Follow up outpatient with Rheum  - Workers comp pending SNF approval      Elevated liver enzymes  Assessment & Plan  - Normal liver enzymes on admission  - CMP 7/30 no significant changes, AST:64, ALT:162  - Liver enzymes back to Normal limits on 8/1  - CTM             PLAN:  - Continue monitoring   - No need for daily CMP  - After DC will follow with Rheum and ID  - Awaiting workers comp approval      Latent tuberculosis  Assessment & Plan  - Positive quantiferon, lived in [place] until 15 years ago.  - Never treated or told he has abnormal tuberculosis testing previously.  - ID recommended Rifampin 4 months and stop doxy  - Follow up outpatient in 2 weeks post DC with ID  - Patient tolerating well drug    Hypokalemia  Assessment & Plan  - Resolved  - Patient eating well  - K 3.7 on admission  - 8/1 3.8  - CTM and replete if needed    Constipation  Assessment & Plan  - Resolved  - Pt has had now 3-4 more BM since given suppository    Acute gout of knee- (present on admission)  Assessment & Plan  - Arthrocentesis showed few monosodium urate crystals  - Hx  of monoarthritis in the past for about 10-12 years, not severe stating that never went to the Dr for this type of pain, it would resolve in 1-2 days with OTC NSAID's  - This presentation was the first time with Polyarthropathy   - We are treating as acute gout as well.  - Patient will need outpatient Rheum follow up   - Also needs to establish with a PCP  - Continues to improve in mobility, now walking independently outside his room with walker.  - Patient was able to climbed few stairs with PT on 7/31, but patient stated that at home he has to climb close to 20 steps and home steps are double in height and he feels not capable to do so.  - Workers comp pending approval in order to be DC to SNF  - CM no new updates

## 2020-08-04 NOTE — PROGRESS NOTES
Assessment completed. Pt A&Ox 4. Respirations are even and unlabored on room air. Pt reports pain at this time.  VS stable, call light and belongings within reach. POC updated (placement). Pt educated on room and call light, pt verbalized understanding. Communication board updated. Needs met.

## 2020-08-04 NOTE — CARE PLAN
Problem: Safety  Goal: Will remain free from falls  Outcome: PROGRESSING AS EXPECTED     Problem: Infection  Goal: Will remain free from infection  Outcome: PROGRESSING AS EXPECTED     Problem: Fluid Volume:  Goal: Will maintain balanced intake and output  Outcome: PROGRESSING AS EXPECTED     Problem: Respiratory:  Goal: Respiratory status will improve  Outcome: PROGRESSING AS EXPECTED     Problem: Bowel/Gastric:  Goal: Normal bowel function is maintained or improved  Outcome: PROGRESSING AS EXPECTED  Goal: Will not experience complications related to bowel motility  Outcome: PROGRESSING AS EXPECTED     Problem: Urinary Elimination:  Goal: Ability to reestablish a normal urinary elimination pattern will improve  Outcome: PROGRESSING AS EXPECTED

## 2020-08-04 NOTE — THERAPY
"Occupational Therapy  Daily Treatment     Patient Name: Fede Miller  Age:  59 y.o., Sex:  male  Medical Record #: [id number]  Today's Date: 8/4/2020     Precautions  Precautions: Fall Risk  Comments: Yi speaking, B UE weakness, L knee flexion limitation    Assessment    Patient seen for OT treatment with focus on BUE follow up. Patient reports that the exercises hurt when he forced any movement... reiterated to patient only move within pain free range and not overdo it. He vocalized understanding. Extensive time spent discussing DC options with patient. He expressed concern about going home where he lives alone, has steps to enter, and is required to use his hands for several ADL and IADL tasks. Recommend post-acute placement at this time.     Plan    Continue current treatment plan.    Discharge recommendations:  Recommend post-acute placement for additional occupational therapy services prior to discharge home.      Subjective    \"I want to go home, of course, I just don't feel sure that I can.\"     Objective       08/04/20 0937   Activities of Daily Living   Eating Supervision   Lower Body Dressing Supervision   Skilled Intervention Verbal Cuing;Compensatory Strategies   Comments Patient has built up  for feeding and grooming tools   Functional Mobility   Sit to Stand Supervised   Bed, Chair, Wheelchair Transfer Supervised   Transfer Method Stand Pivot   Mobility sup><sit, STS   Skilled Intervention Verbal Cuing;Compensatory Strategies   Short Term Goals   Short Term Goal # 1 Pt will complete toilet xfer supv   Goal Outcome # 1 Progressing as expected   Short Term Goal # 2 Pt will complete LB dressing supv   Goal Outcome # 2 Progressing as expected   Short Term Goal # 3 Pt will complete G/H (brushing teeth) supv   Goal Outcome # 3 Progressing as expected         "

## 2020-08-04 NOTE — THERAPY
Physical Therapy   Daily Treatment     Patient Name: Fede Miller  Age:  59 y.o., Sex:  male  Medical Record #: [id number]  Today's Date: 8/4/2020     Precautions: Fall Risk    Assessment  Pt continues to show progression with mobility. Pt able to complete all mobility at SPV level and was able to negotiate stairs with step-to pattern, single rail, supervision. Pt ambulated outside on various surfaces and inclines. Pt demonstrates steady gait and improved functional ROM to be able to navigate environment. Pt's biggest concerns are related to his hands and fine motor skills regarding returning home alone to complete all of his ADLs and IADLs. Pt reports he does not feel safe to return home. As pt displays more OT needs at this time, will reduce frequency as he is mobilizing and ambulating daily with nursing. Patient would still benefit from post-acute placement for additional rehabilitation services to focus on IADL and community mobility training that is not available in the acute care setting. If to return home, recommend home health therapy, though defer recs to OT.     Plan  Modify to 1x/wk.   Discharge recommendations:  Recommend post-acute placement for continued physical therapy services prior to discharge home.          08/04/20 0934   Other Treatments   Other Treatments Provided lengthy discussion regarding DC plan and role of outpatient vs HH vs rehab. discussed safety concerns of patient's with return home and talked with RN and CM. pt concerned about his hands and fine motor skills regarding eating and functioning alone in his home.    Balance   Sitting Balance (Static) Fair +   Sitting Balance (Dynamic) Fair +   Standing Balance (Static) Fair   Standing Balance (Dynamic) Fair   Comments no AD   Gait Analysis   Gait Level Of Assist Supervised   Assistive Device None   Distance (Feet)   (> 500)   Deviation Increased Base Of Support;Bradykinetic   # of Stairs Climbed 15   Level of Assist with Stairs  Supervised   Weight Bearing Status no restrictions   Comments ambulated outside in healing garden. pt negotiated rocks, inclines, uneven surfaces, turns. pt able to bend fwds and turn around. no LOB throughout. slower mariama on rocks. incr pain with incr knee flexion but able to achieve adequate knee flexion to negotiate stairs.    Bed Mobility    Supine to Sit Supervised   Sit to Supine Supervised   Functional Mobility   Sit to Stand Supervised   Bed, Chair, Wheelchair Transfer Supervised   Short Term Goals    Short Term Goal # 1 pt will perform supine <> sit without bed features with SPV in 6 visits to be able to get in/out of bed at home   Goal Outcome # 1 Goal met   Short Term Goal # 2 pt will perform all functional xfrs with SPV in 6 visits for improved independence   Goal Outcome # 2 Goal met   Short Term Goal # 3 pt will ambulate 150ft with SPV in 6 visits to access home   Goal Outcome # 3 Goal met   Short Term Goal # 4 pt will negotiate FOS with SPV in 6 visits to access home   Goal Outcome # 4 Goal met   Short Term Goal # 5 pt will complete all mobility at mod I in 6 visits for safe return home   Goal Outcome # 5 Progressing as expected   Anticipated Discharge Equipment   DC Equipment None

## 2020-08-04 NOTE — DISCHARGE PLANNING
Called Workers Comp and spoke with Bailey, she requested MD's SNF referral be faxed to [phone]. Copy of SNF referral faxed to [facility] will await response.

## 2020-08-05 PROCEDURE — 99225 PR SUBSEQUENT OBSERVATION CARE,LEVEL II: CPT | Mod: GC | Performed by: HOSPITALIST

## 2020-08-05 PROCEDURE — 700102 HCHG RX REV CODE 250 W/ 637 OVERRIDE(OP): Performed by: STUDENT IN AN ORGANIZED HEALTH CARE EDUCATION/TRAINING PROGRAM

## 2020-08-05 PROCEDURE — G0378 HOSPITAL OBSERVATION PER HR: HCPCS

## 2020-08-05 PROCEDURE — 700111 HCHG RX REV CODE 636 W/ 250 OVERRIDE (IP): Performed by: STUDENT IN AN ORGANIZED HEALTH CARE EDUCATION/TRAINING PROGRAM

## 2020-08-05 PROCEDURE — 700101 HCHG RX REV CODE 250: Performed by: STUDENT IN AN ORGANIZED HEALTH CARE EDUCATION/TRAINING PROGRAM

## 2020-08-05 PROCEDURE — 96376 TX/PRO/DX INJ SAME DRUG ADON: CPT

## 2020-08-05 PROCEDURE — 96372 THER/PROPH/DIAG INJ SC/IM: CPT

## 2020-08-05 PROCEDURE — A9270 NON-COVERED ITEM OR SERVICE: HCPCS | Performed by: STUDENT IN AN ORGANIZED HEALTH CARE EDUCATION/TRAINING PROGRAM

## 2020-08-05 RX ORDER — MORPHINE SULFATE 4 MG/ML
1 INJECTION, SOLUTION INTRAMUSCULAR; INTRAVENOUS ONCE
Status: COMPLETED | OUTPATIENT
Start: 2020-08-05 | End: 2020-08-05

## 2020-08-05 RX ADMIN — RIFAMPIN 600 MG: 300 CAPSULE ORAL at 05:22

## 2020-08-05 RX ADMIN — MORPHINE SULFATE 1 MG: 4 INJECTION INTRAVENOUS at 11:20

## 2020-08-05 RX ADMIN — ACETAMINOPHEN 1000 MG: 500 TABLET ORAL at 05:21

## 2020-08-05 RX ADMIN — ACETAMINOPHEN 1000 MG: 500 TABLET ORAL at 17:23

## 2020-08-05 RX ADMIN — POLYETHYLENE GLYCOL 3350 1 PACKET: 17 POWDER, FOR SOLUTION ORAL at 05:22

## 2020-08-05 RX ADMIN — LIDOCAINE 1 PATCH: 50 PATCH CUTANEOUS at 10:42

## 2020-08-05 RX ADMIN — ACETAMINOPHEN 1000 MG: 500 TABLET ORAL at 11:02

## 2020-08-05 RX ADMIN — ENOXAPARIN SODIUM 40 MG: 40 INJECTION SUBCUTANEOUS at 05:22

## 2020-08-05 RX ADMIN — NAPROXEN 500 MG: 500 TABLET ORAL at 08:53

## 2020-08-05 RX ADMIN — OMEPRAZOLE 20 MG: 20 CAPSULE, DELAYED RELEASE ORAL at 05:22

## 2020-08-05 RX ADMIN — NAPROXEN 500 MG: 500 TABLET ORAL at 17:23

## 2020-08-05 NOTE — PROGRESS NOTES
Received care of pt from NOC RN this am. Pt is A+O x 4. Using interpretor Ipad with pt today. Pt denies need for pain medication at this time.

## 2020-08-05 NOTE — PROGRESS NOTES
Daily Progress Note:     Date of Service: 8/5/2020  Primary Team: [facility]   Attending: Yash Calderon M.D.   Senior Resident: Dr. Starr  Intern: Dr. Marinelli  Contact:  [phone]    Chief Complaint:   Polyarthropathy  Latent TB  Gout       Subjective:  No acute events overnight reported.  Significant improvement in swelling and functionality in lower extremities and right wrist, left wrists and 2nd,3rd,4th finger still severe swelling and reduced ROM, pain is tolerable with schedules pain killers.  Patient does not have family support to help with his medical condition.  We are still awaiting for Workers comp to approve authorization.   Patient continues to improve significantly likely will be able to be DC home if SNF transfer keeps delaying.  8/5 CM update, spoke again with Bailey ([employer] comp) still pending review and approval.  We will continue to await for response.    Consultants/Specialty:  ID  Orthopaedic    Review of Systems:       Review of Systems   Constitutional: Negative for chills, diaphoresis, fever and malaise/fatigue.   HENT: Negative for congestion, hearing loss, nosebleeds, sore throat and tinnitus.    Eyes: Negative for blurred vision, double vision and photophobia.   Respiratory: Negative for cough, hemoptysis, sputum production and shortness of breath.    Cardiovascular: Negative for chest pain, palpitations, orthopnea, claudication and leg swelling.   Gastrointestinal: Negative for abdominal pain, blood in stool, constipation, heartburn, melena, nausea and vomiting.   Genitourinary: Negative for dysuria, frequency, hematuria and urgency.   Musculoskeletal: Positive for joint pain. Negative for back pain, myalgias and neck pain.   Skin: Negative for itching and rash.   Neurological: Negative for dizziness, tingling, sensory change, focal weakness and headaches.   Endo/Heme/Allergies: Negative for environmental allergies. Does not bruise/bleed easily.    Psychiatric/Behavioral: Negative for depression, hallucinations and suicidal ideas.     Objective Data:   Physical Exam:   Vitals:   Temp:  [36.1 °C (97 °F)-36.7 °C (98.1 °F)] 36.4 °C (97.5 °F)  Pulse:  [60-66] 60  Resp:  [16] 16  BP: (112-129)/(72-81) 116/76  SpO2:  [93 %-99 %] 99 %    Physical Exam    Physical Exam  Vitals signs reviewed.   Constitutional:       General: He is not in acute distress.     Appearance: Normal appearance. He is not ill-appearing.   HENT:      Head: Normocephalic and atraumatic.      Right Ear: There is no impacted cerumen.      Left Ear: There is no impacted cerumen.      Nose: Nose normal. No congestion or rhinorrhea.      Mouth/Throat:      Mouth: Mucous membranes are moist.      Pharynx: Oropharynx is clear. No oropharyngeal exudate or posterior oropharyngeal erythema.   Eyes:      General: No scleral icterus.     Extraocular Movements: Extraocular movements intact.      Conjunctiva/sclera: Conjunctivae normal.      Pupils: Pupils are equal, round, and reactive to light.   Neck:      Musculoskeletal: Normal range of motion and neck supple. No neck rigidity or muscular tenderness.   Cardiovascular:      Rate and Rhythm: Normal rate and regular rhythm.      Pulses: Normal pulses.      Heart sounds: Normal heart sounds. No murmur.   Pulmonary:      Effort: Pulmonary effort is normal. No respiratory distress.      Breath sounds: Normal breath sounds. No wheezing or rhonchi.   Abdominal:      General: Bowel sounds are normal. There is no distension.      Palpations: Abdomen is soft. There is no mass.      Tenderness: There is no abdominal tenderness. There is no guarding or rebound.   Musculoskeletal:         General: Swelling, tenderness and deformity present.      Right lower leg: No edema.      Left lower leg: No edema.      Comments: Feet and ankle joints normal ROM, no swelling noticed, no pain or erythema, normal local temperature.  Right knee back to baseline, normal  ROM, no  swelling, erythema or increased local temperature.  Left knee significant improvement in his ROM, swelling resolved, no increased local temperature or erythema, ambulation significantly improved, no needing walking device.  Right wrist mild swelling, improved ROM, most finger swelling almost back to his baseline with exception middle finger which still has severe swelling and reduced ROM.    Skin:     General: Skin is warm.      Capillary Refill: Capillary refill takes less than 2 seconds.      Coloration: Skin is not jaundiced.      Findings: No bruising or erythema.   Neurological:      General: No focal deficit present.      Mental Status: He is alert and oriented to person, place, and time. Mental status is at baseline.      Cranial Nerves: No cranial nerve deficit.   Psychiatric:         Mood and Affect: Mood normal.         Behavior: Behavior normal.         Thought Content: Thought content normal.         Labs:   Review    Imaging:   Review    Problem Representation:       Polyarthralgia with peripheral swelling  Assessment & Plan  - No previous Hx of Rheumatologic disease  - Denied family history of Rheumatoid conditions  - Patient stated that in the past he has had Joint swelling and pain, but he has been able to tolerate it and resolve with OTC NSAID's  - This is the first time it get very severe and first time that this many joints are involved  - Ankles, right knee and feet resolved pain and swelling.  - 7/30 Left Knee improved, patient now walking on hallways on his own, still has reduced ROM, but much better.  - Wrists and fingers continue to  Improve, left wrists still swelling more evident on his left side, ROM worse as well.  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    - VS at ED unremarkable  -  Imaging knees showed mild degenerative disease  - Admission Labs: Cbc- 17.9 wbc, hb 12.6, Crp-12.61 (elevated) on, Esr-elevated at 56, Uric acid- 4.6  - ID consulted 7/27  - 8/4 Continues to improve in functioning and mobility on his left knee. Hands moderate/severe moderate/severe         swelling and reduced ROM.           Patient not well enough to sent home to perform all activities of daily living, severe ROM limitation on hands and moderate on left knee.  - CM working on SNF transfer, likely to occur till next week.   - Workers comp pending approval for SNF  - 8/5 CM spoke again to Bailey no update in  Pending authorization Status.                      PLAN:  - Transfer to Medical status since might not get DC till next week  - Continue same management  - CTM  - ID follow up 2 weeks after DC  - Encourage ambulation  - DC to SNIF when approved  - Follow up outpatient with Rheum  - Workers comp pending SNF approval      Elevated liver enzymes  Assessment & Plan  - Normal liver enzymes on admission  - CMP 7/30 no significant changes, AST:64, ALT:162  - Liver enzymes back to Normal limits on 8/1  - CTM             PLAN:  - Continue monitoring   - No need for daily CMP  - After DC will follow with Rheum and ID  - Awaiting workers comp approval      Latent tuberculosis  Assessment & Plan  - Positive quantiferon, lived in [place] until 15 years ago.  - Never treated or told he has abnormal tuberculosis testing previously.  - ID recommended Rifampin 4 months and stop doxy  - Follow up outpatient in 2 weeks post DC with ID  - Patient tolerating well drug    Hypokalemia  Assessment & Plan  - Resolved  - Patient eating well  - K 3.7 on admission  - 8/1 3.8  - CTM and replete if needed    Constipation  Assessment & Plan  - Resolved  - Pt has had now 3-4 more BM since given suppository    Acute gout of knee- (present on admission)  Assessment & Plan  - Arthrocentesis showed few monosodium urate crystals  - Hx of  monoarthritis in the past for about 10-12 years, not severe stating that never went to the Dr for this type of pain, it would resolve in 1-2 days with OTC NSAID's  - This presentation was the first time with Polyarthropathy   - We are treating as acute gout as well.  - Patient will need outpatient Rheum follow up   - Also needs to establish with a PCP  - Continues to improve in mobility, now walking independently outside his room with walker.  - Patient was able to climbed few stairs with PT on 7/31, but patient stated that at home he has to climb close to 20 steps and home steps are double in height and he feels not capable to do so.  - Workers comp pending approval in order to be DC to SNF  - CM no new updates

## 2020-08-05 NOTE — CARE PLAN
Problem: Mobility  Goal: Risk for activity intolerance will decrease  Outcome: PROGRESSING AS EXPECTED    Pt walking in halls today using walker w/o difficulty.      Problem: Knowledge Deficit  Goal: Knowledge of disease process/condition, treatment plan, diagnostic tests, and medications will improve  Outcome: PROGRESSING AS EXPECTED    Discussed plan of care for today w/ pt this am using interpretor iPad, he is A+O, he verbalizes understanding of his plan of care, questions answered. Emotional support given. Reinforcing education as necessary. Discussed pt's care with resident.

## 2020-08-05 NOTE — PROGRESS NOTES
A/o, respirations are even and unlabored on room air, assessment completed, pt still complaining of joint pain, vital signs stable, updated communication board,  poc discussed and understood, verbalized understanding, all questions answered at this time , fall precautions in place, call button within reach, will continue to monitor

## 2020-08-05 NOTE — DISCHARGE PLANNING
Received call from Milagro @ [facility] and workers comp is still pending case. Milagro requesting info if patient has other insurance. Spoke with patient via  and patient does not have insurance or PCP. Called and updated Milagro at [facility] and [facility] can not accept patient until workers comp is completed. Pao [facility]HOAD[facility] updated.

## 2020-08-05 NOTE — PROGRESS NOTES
Called resident, discussed pt's pain with him, order received for morphine IV x 1, med adminstered. See doc flowsheets

## 2020-08-06 LAB — B DERMAT AB SER-ACNC: 0.3 IV

## 2020-08-06 PROCEDURE — A9270 NON-COVERED ITEM OR SERVICE: HCPCS | Performed by: STUDENT IN AN ORGANIZED HEALTH CARE EDUCATION/TRAINING PROGRAM

## 2020-08-06 PROCEDURE — 96372 THER/PROPH/DIAG INJ SC/IM: CPT

## 2020-08-06 PROCEDURE — 700102 HCHG RX REV CODE 250 W/ 637 OVERRIDE(OP): Performed by: STUDENT IN AN ORGANIZED HEALTH CARE EDUCATION/TRAINING PROGRAM

## 2020-08-06 PROCEDURE — 700111 HCHG RX REV CODE 636 W/ 250 OVERRIDE (IP): Performed by: STUDENT IN AN ORGANIZED HEALTH CARE EDUCATION/TRAINING PROGRAM

## 2020-08-06 PROCEDURE — 700101 HCHG RX REV CODE 250: Performed by: STUDENT IN AN ORGANIZED HEALTH CARE EDUCATION/TRAINING PROGRAM

## 2020-08-06 PROCEDURE — G0378 HOSPITAL OBSERVATION PER HR: HCPCS

## 2020-08-06 PROCEDURE — 99225 PR SUBSEQUENT OBSERVATION CARE,LEVEL II: CPT | Mod: GC | Performed by: HOSPITALIST

## 2020-08-06 RX ADMIN — POLYETHYLENE GLYCOL 3350 1 PACKET: 17 POWDER, FOR SOLUTION ORAL at 06:17

## 2020-08-06 RX ADMIN — ACETAMINOPHEN 1000 MG: 500 TABLET ORAL at 18:50

## 2020-08-06 RX ADMIN — RIFAMPIN 600 MG: 300 CAPSULE ORAL at 06:16

## 2020-08-06 RX ADMIN — OMEPRAZOLE 20 MG: 20 CAPSULE, DELAYED RELEASE ORAL at 06:16

## 2020-08-06 RX ADMIN — NAPROXEN 500 MG: 500 TABLET ORAL at 10:28

## 2020-08-06 RX ADMIN — LIDOCAINE 1 PATCH: 50 PATCH CUTANEOUS at 10:31

## 2020-08-06 RX ADMIN — ENOXAPARIN SODIUM 40 MG: 40 INJECTION SUBCUTANEOUS at 06:16

## 2020-08-06 RX ADMIN — ACETAMINOPHEN 1000 MG: 500 TABLET ORAL at 06:16

## 2020-08-06 RX ADMIN — ACETAMINOPHEN 1000 MG: 500 TABLET ORAL at 15:11

## 2020-08-06 NOTE — PROGRESS NOTES
Bedside report received at 1900. Assessment done. VSS. AOx4, Georgian speaking only,  in use. Unlabored and even breathing,RA. No pain at this time. Skin intact. 100% dinner finished. Hourly patient rounding done. Fall precautions in place.  Call light in place, bed locked and in lowest position. White board updated. Reviewed POC. All questions answered at this time.

## 2020-08-06 NOTE — PROGRESS NOTES
Pt medicated for pain control. Denies needs. PO intake encouraged. Will pass care to NOC RN @ [facility].

## 2020-08-06 NOTE — PROGRESS NOTES
Daily Progress Note:     Date of Service: 8/6/2020  Primary Team: [facility] Green Team   Attending: Yash Calderon M.D.   Senior Resident: Dr. Starr  Intern: Dr. Marinelli  Contact:  [phone]    Chief Complaint:   Chief Complaint:   Polyarthropathy  Latent TB  Gout        Subjective:   No acute events overnight reported by RN personnel.  Patient is doing well, mobility and swelling with significant improvement in most of affected joints at presentation. Worse joints causing pain and severe limitation of ROM limited to left wrist and fingers 2nd, 3rd and 4th.  Patient continues to have moderate pain occasionally, he is ambulatory without walking device, tolerating well oral intake, no issues voiding or moving his bowels.  CM in daily communication with [employer] (worners comp), stating authorization will not be approved, patient also not candidate for home health, must pay out of pocket, but patient unable to afford it.  Medical team now contemplating possible DC home in the near future since he continues to progress remarkably.  Patient will be discharged home, he needs to establish care with PCP, follow up with Rheum and Infectious disease.  I spoke with his friend which will be providing a ride and will help him as much as he can at home, friend expected to  Mr White at 4 pm.  Patient's limitations are now only limited to left hand, he is able to use his right hand at a functional level, patient independent, takes showers w/o help, ambulatory, able to use his phone, able to feed himself. Left wrist and 2,3,4 finger moderate reduced ROM, with some improvement.     Consultants/Specialty:  ID  Orthopaedic    Review of Systems:         Review of Systems   Constitutional: Negative for chills, diaphoresis, fever and malaise/fatigue.   HENT: Negative for congestion, hearing loss, nosebleeds, sore throat and tinnitus.    Eyes: Negative for blurred vision, double vision and photophobia.   Respiratory: Negative  for cough, hemoptysis, sputum production and shortness of breath.    Cardiovascular: Negative for chest pain, palpitations, orthopnea, claudication and leg swelling.   Gastrointestinal: Negative for abdominal pain, blood in stool, constipation, heartburn, melena, nausea and vomiting.   Genitourinary: Negative for dysuria, frequency, hematuria and urgency.   Musculoskeletal: Positive for joint pain. Negative for back pain, myalgias and neck pain.   Skin: Negative for itching and rash.   Neurological: Negative for dizziness, tingling, sensory change, focal weakness and headaches.   Endo/Heme/Allergies: Negative for environmental allergies. Does not bruise/bleed easily.   Psychiatric/Behavioral: Negative for depression, hallucinations and suicidal ideas.     Objective Data:   Physical Exam:   Vitals:   Temp:  [36.4 °C (97.5 °F)-37.1 °C (98.7 °F)] 36.6 °C (97.8 °F)  Pulse:  [55-80] 60  Resp:  [18-20] 18  BP: (119-126)/(79-81) 124/80  SpO2:  [95 %-98 %] 96 %        Physical Exam  Vitals signs reviewed.   Constitutional:       General: He is not in acute distress.     Appearance: Normal appearance. He is not ill-appearing.   HENT:      Head: Normocephalic and atraumatic.      Right Ear: There is no impacted cerumen.      Left Ear: There is no impacted cerumen.      Nose: Nose normal. No congestion or rhinorrhea.      Mouth/Throat:      Mouth: Mucous membranes are moist.      Pharynx: Oropharynx is clear. No oropharyngeal exudate or posterior oropharyngeal erythema.   Eyes:      General: No scleral icterus.     Extraocular Movements: Extraocular movements intact.      Conjunctiva/sclera: Conjunctivae normal.      Pupils: Pupils are equal, round, and reactive to light.   Neck:      Musculoskeletal: Normal range of motion and neck supple. No neck rigidity or muscular tenderness.   Cardiovascular:      Rate and Rhythm: Normal rate and regular rhythm.      Pulses: Normal pulses.      Heart sounds: Normal heart sounds. No  murmur.   Pulmonary:      Effort: Pulmonary effort is normal. No respiratory distress.      Breath sounds: Normal breath sounds. No wheezing or rhonchi.   Abdominal:      General: Bowel sounds are normal. There is no distension.      Palpations: Abdomen is soft. There is no mass.      Tenderness: There is no abdominal tenderness. There is no guarding or rebound.   Musculoskeletal:         General: Swelling, tenderness and deformity present.      Right lower leg: No edema.      Left lower leg: No edema.      Comments: Feet and ankle joints normal ROM, no swelling noticed, no pain or erythema, normal local temperature.  Right knee back to baseline, normal  ROM, no swelling, erythema or increased local temperature.  Left knee significant improvement in his ROM, swelling resolved, no increased local temperature or erythema, ambulation significantly improved, no needing walking device.  Right wrist mild swelling, improved ROM, most finger swelling almost back to his baseline with exception middle finger which still has severe swelling and reduced ROM.     Skin:     General: Skin is warm.      Capillary Refill: Capillary refill takes less than 2 seconds.      Coloration: Skin is not jaundiced.      Findings: No bruising or erythema.   Neurological:      General: No focal deficit present.      Mental Status: He is alert and oriented to person, place, and time. Mental status is at baseline.      Cranial Nerves: No cranial nerve deficit.   Psychiatric:         Mood and Affect: Mood normal.         Behavior: Behavior normal.          Labs:   Review    Imaging:   Review    Problem Representation:        Polyarthralgia with peripheral swelling  Assessment & Plan  - No previous Hx of Rheumatologic disease  - Denied family history of Rheumatoid conditions  - Patient stated that in the past he has had Joint swelling and pain, but he has been able to tolerate it and resolve with OTC NSAID's  - This is the first time it get very  severe and first time that this many joints are involved  - Ankles, right knee and feet resolved pain and swelling.  - 7/30 Left Knee improved, patient now walking on hallways on his own, still has reduced ROM, but much better.  - Wrists and fingers continue to  Improve, left wrists still swelling more evident on his left side, ROM worse as well.  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    - VS at ED unremarkable  - Imaging knees showed mild degenerative disease  - Admission Labs: Cbc- 17.9 wbc, hb 12.6, Crp-12.61 (elevated) on, Esr-elevated at 56, Uric acid- 4.6  - ID consulted  On 7/27  - 8/6 Continues to improve in functioning and mobility on his left knee. Knees and feet back to baseline, no swelling or pain. Right wrist mild swelling and diminished ROM, middle right finger moderate swelling with severe restricted ROM.  Left wrist moderate swelling and decreased ROM, left 2,3,4 finger still moderate swelling and severe limitation ROM.  Pain is mild/moderate.  -  8/6 CM in daily communication with [employer] (workers comp) will NOT authorize patient approval.  - 8/6 Patient will be DC home, unable to provide HH since he needs to pay out of pocket and not able to afford it                      PLAN:  - DC home  - OTC NSAID's  - Topic OTC NSAID's  - Establish PCP  - Continue Therapy   - Rheum follow up  - ID follow up    Elevated liver enzymes  Assessment & Plan  - Normal liver enzymes on admission  - CMP 7/30 no significant changes, AST:64, ALT:162  - Liver enzymes back to Normal limits on 8/1  - CTM             PLAN:    - Establish with a PCP   - Follow with Rheum and ID  -       Latent tuberculosis  Assessment & Plan  - Positive quantiferon, lived in [place] until 15 years ago.  - Never treated or told he has  abnormal tuberculosis testing previously.  - ID recommended Rifampin 4 months   - Establish with a PCP as soon as possible   - Follow up outpatient in 2 weeks post DC with ID  - Patient tolerating well drug    Hypokalemia  Assessment & Plan  - Resolved  - Patient eating well  - K 3.7 on admission  - 8/1 3.8  - CTM and replete if needed    Constipation  Assessment & Plan  - Resolved  - Pt has had now 3-4 more BM since given suppository    Acute gout of knee- (present on admission)  Assessment & Plan  - Arthrocentesis showed few monosodium urate crystals  - Hx of monoarthritis in the past for about 10-12 years, not severe stating that never went to the Dr for this type of pain, it would resolve in 1-2 days with OTC NSAID's  - This presentation was the first time with Polyarthropathy   - We are treating as acute gout as well.  - Patient will need outpatient Rheum follow up   - Also needs to establish with a PCP  - Continues to improve in mobility, now walking independently outside his room with walker.  - 8/6 Patient able to walk w/o devices, now at baseline, knees and feet no swelling or pain at all.  - Establish with a PCP  - CM no new updates

## 2020-08-06 NOTE — PROGRESS NOTES
Pt in bed awake,a&ox4,BL hand swollen,c/o pain on movement,pt wants to take shower,poc explained Lewis-interpretor helped in communicating.

## 2020-08-06 NOTE — CARE PLAN
Problem: Safety  Goal: Will remain free from falls  Outcome: PROGRESSING AS EXPECTED     Problem: Infection  Goal: Will remain free from infection  Outcome: PROGRESSING AS EXPECTED     Problem: Fluid Volume:  Goal: Will maintain balanced intake and output  Outcome: PROGRESSING AS EXPECTED     Problem: Respiratory:  Goal: Respiratory status will improve  Outcome: PROGRESSING AS EXPECTED     Problem: Bowel/Gastric:  Goal: Normal bowel function is maintained or improved  Outcome: PROGRESSING AS EXPECTED  Goal: Will not experience complications related to bowel motility  Outcome: PROGRESSING AS EXPECTED     Problem: Urinary Elimination:  Goal: Ability to reestablish a normal urinary elimination pattern will improve  Outcome: PROGRESSING AS EXPECTED     Problem: Skin Integrity  Goal: Risk for impaired skin integrity will decrease  Outcome: PROGRESSING AS EXPECTED     Problem: Mobility  Goal: Risk for activity intolerance will decrease  Outcome: PROGRESSING AS EXPECTED     Problem: Medication  Goal: Compliance with prescribed medication will improve  Outcome: PROGRESSING AS EXPECTED     Problem: Knowledge Deficit  Goal: Knowledge of disease process/condition, treatment plan, diagnostic tests, and medications will improve  Outcome: PROGRESSING AS EXPECTED  Goal: Knowledge of the prescribed therapeutic regimen will improve  Outcome: PROGRESSING AS EXPECTED     Problem: Discharge Barriers/Planning  Goal: Patient's continuum of care needs will be met  Outcome: PROGRESSING AS EXPECTED    Awaiting SNF placement, Pain control. Patient education on hand hygiene and infection prevention measures. Fall precautions in place, educated to call for assistance. POC discussed, verbalized understanding. Pain assessed, interventions appropriate.

## 2020-08-07 ENCOUNTER — PHARMACY VISIT (OUTPATIENT)
Dept: PHARMACY | Facility: MEDICAL CENTER | Age: 60
End: 2020-08-07
Payer: COMMERCIAL

## 2020-08-07 VITALS
DIASTOLIC BLOOD PRESSURE: 75 MMHG | BODY MASS INDEX: 26.46 KG/M2 | RESPIRATION RATE: 18 BRPM | WEIGHT: 174.6 LBS | TEMPERATURE: 97.8 F | OXYGEN SATURATION: 96 % | SYSTOLIC BLOOD PRESSURE: 121 MMHG | HEIGHT: 68 IN | HEART RATE: 62 BPM

## 2020-08-07 PROCEDURE — RXMED WILLOW AMBULATORY MEDICATION CHARGE: Performed by: STUDENT IN AN ORGANIZED HEALTH CARE EDUCATION/TRAINING PROGRAM

## 2020-08-07 PROCEDURE — G0378 HOSPITAL OBSERVATION PER HR: HCPCS

## 2020-08-07 PROCEDURE — 99217 PR OBSERVATION CARE DISCHARGE: CPT | Mod: GC | Performed by: HOSPITALIST

## 2020-08-07 PROCEDURE — 700111 HCHG RX REV CODE 636 W/ 250 OVERRIDE (IP): Performed by: STUDENT IN AN ORGANIZED HEALTH CARE EDUCATION/TRAINING PROGRAM

## 2020-08-07 PROCEDURE — A9270 NON-COVERED ITEM OR SERVICE: HCPCS | Performed by: STUDENT IN AN ORGANIZED HEALTH CARE EDUCATION/TRAINING PROGRAM

## 2020-08-07 PROCEDURE — 700101 HCHG RX REV CODE 250: Performed by: STUDENT IN AN ORGANIZED HEALTH CARE EDUCATION/TRAINING PROGRAM

## 2020-08-07 PROCEDURE — 96372 THER/PROPH/DIAG INJ SC/IM: CPT

## 2020-08-07 PROCEDURE — 700102 HCHG RX REV CODE 250 W/ 637 OVERRIDE(OP): Performed by: STUDENT IN AN ORGANIZED HEALTH CARE EDUCATION/TRAINING PROGRAM

## 2020-08-07 RX ORDER — RIFAMPIN 300 MG/1
600 CAPSULE ORAL DAILY
Qty: 60 CAP | Refills: 11 | Status: SHIPPED
Start: 2020-08-08 | End: 2020-10-01

## 2020-08-07 RX ADMIN — ENOXAPARIN SODIUM 40 MG: 40 INJECTION SUBCUTANEOUS at 05:12

## 2020-08-07 RX ADMIN — OMEPRAZOLE 20 MG: 20 CAPSULE, DELAYED RELEASE ORAL at 05:12

## 2020-08-07 RX ADMIN — NAPROXEN 500 MG: 500 TABLET ORAL at 09:35

## 2020-08-07 RX ADMIN — RIFAMPIN 600 MG: 300 CAPSULE ORAL at 05:12

## 2020-08-07 RX ADMIN — ACETAMINOPHEN 1000 MG: 500 TABLET ORAL at 05:12

## 2020-08-07 RX ADMIN — LIDOCAINE 1 PATCH: 50 PATCH CUTANEOUS at 09:35

## 2020-08-07 NOTE — PROGRESS NOTES
Daily Progress Note:     Date of Service: 8/7/2020  Primary Team: [facility] Green Team   Attending: Yash Calderon M.D.   Senior Resident: Dr. Starr  Intern: Dr. Marinelli  Contact:  [phone]    Chief Complaint:   Polyarthropathy  Latent TB  Gout            Subjective:  Patient's orders for discharge yesterday 8/6 fell off.  Patient medically clear to be DC home.  Mild pain, he will follow up with ID and Rheum.  He needs to establish PCP.          Consultants/Specialty:  ID  Orthopaedic      Review of Systems:       Review of Systems   Constitutional: Negative for chills, diaphoresis, fever and malaise/fatigue.   HENT: Negative for congestion, hearing loss, nosebleeds, sore throat and tinnitus.    Eyes: Negative for blurred vision, double vision and photophobia.   Respiratory: Negative for cough, hemoptysis, sputum production and shortness of breath.    Cardiovascular: Negative for chest pain, palpitations, orthopnea, claudication and leg swelling.   Gastrointestinal: Negative for abdominal pain, blood in stool, constipation, heartburn, melena, nausea and vomiting.   Genitourinary: Negative for dysuria, frequency, hematuria and urgency.   Musculoskeletal: Positive for joint pain. Negative for back pain, myalgias and neck pain.   Skin: Negative for itching and rash.   Neurological: Negative for dizziness, tingling, sensory change, focal weakness and headaches.   Endo/Heme/Allergies: Negative for environmental allergies. Does not bruise/bleed easily.   Psychiatric/Behavioral: Negative for depression, hallucinations and suicidal ideas.   Objective Data:   Physical Exam:   Vitals:   Temp:  [36.4 °C (97.5 °F)-36.6 °C (97.8 °F)] 36.6 °C (97.8 °F)  Pulse:  [61-68] 62  Resp:  [17-20] 18  BP: (115-140)/(70-93) 121/75  SpO2:  [95 %-98 %] 96 %      Physical Exam  Vitals signs reviewed.   Constitutional:       General: He is not in acute distress.     Appearance: Normal appearance. He is not ill-appearing.   HENT:       Head: Normocephalic and atraumatic.      Right Ear: There is no impacted cerumen.      Left Ear: There is no impacted cerumen.      Nose: Nose normal. No congestion or rhinorrhea.      Mouth/Throat:      Mouth: Mucous membranes are moist.      Pharynx: Oropharynx is clear. No oropharyngeal exudate or posterior oropharyngeal erythema.   Eyes:      General: No scleral icterus.     Extraocular Movements: Extraocular movements intact.      Conjunctiva/sclera: Conjunctivae normal.      Pupils: Pupils are equal, round, and reactive to light.   Neck:      Musculoskeletal: Normal range of motion and neck supple. No neck rigidity or muscular tenderness.   Cardiovascular:      Rate and Rhythm: Normal rate and regular rhythm.      Pulses: Normal pulses.      Heart sounds: Normal heart sounds. No murmur.   Pulmonary:      Effort: Pulmonary effort is normal. No respiratory distress.      Breath sounds: Normal breath sounds. No wheezing or rhonchi.   Abdominal:      General: Bowel sounds are normal. There is no distension.      Palpations: Abdomen is soft. There is no mass.      Tenderness: There is no abdominal tenderness. There is no guarding or rebound.   Musculoskeletal:         General: Swelling, tenderness and deformity present.      Right lower leg: No edema.      Left lower leg: No edema.      Comments: Feet and ankle joints normal ROM, no swelling noticed, no pain or erythema, normal local temperature.  Right knee back to baseline, normal  ROM, no swelling, erythema or increased local temperature.  Left knee significant improvement in his ROM, swelling resolved, no increased local temperature or erythema, ambulation significantly improved, no needing walking device.  Right wrist mild swelling, improved ROM, most finger swelling almost back to his baseline with exception middle finger which still has severe swelling and reduced ROM.     Skin:     General: Skin is warm.      Capillary Refill: Capillary refill takes  less than 2 seconds.      Coloration: Skin is not jaundiced.      Findings: No bruising or erythema.   Neurological:      General: No focal deficit present.      Mental Status: He is alert and oriented to person, place, and time. Mental status is at baseline.      Cranial Nerves: No cranial nerve deficit.   Psychiatric:         Mood and Affect: Mood normal.         Behavior: Behavior normal.         Labs:   Review    Imaging:   Review    Problem Representation:       Polyarthralgia with peripheral swelling  Assessment & Plan  - No previous Hx of Rheumatologic disease  - Denied family history of Rheumatoid conditions  - Patient stated that in the past he has had Joint swelling and pain, but he has been able to tolerate it and resolve with OTC NSAID's  - This is the first time it get very severe and first time that this many joints are involved  - Ankles, right knee and feet resolved pain and swelling.  - 7/30 Left Knee improved, patient now walking on hallways on his own, still has reduced ROM, but much better.  - Wrists and fingers continue to  Improve, left wrists still swelling more evident on his left side, ROM worse as well.  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    - VS at ED unremarkable  - Imaging knees showed mild degenerative disease  - Admission Labs: Cbc- 17.9 wbc, hb 12.6, Crp-12.61 (elevated) on, Esr-elevated at 56, Uric acid- 4.6  - ID consulted  On 7/27  - 8/6 Continues to improve in functioning and mobility on his left knee. Knees and feet back to baseline, no swelling or pain. Right wrist mild swelling and diminished ROM, middle right finger moderate swelling with severe restricted ROM.  Left wrist moderate swelling and decreased ROM, left 2,3,4 finger still moderate swelling and severe  limitation ROM.  Pain is mild/moderate.  -  8/6 CM in daily communication with [employer] (workers comp) will NOT authorize patient approval.  - 8/6 DC on 8/6 failed, patient's ride also did not show up.  - 8/7 Patient told early morning that he will be DC with or W/O ride           He is medically clear for safe DC.                      PLAN:  - DC home  - OTC NSAID's  - Topic OTC NSAID's  - Establish PCP  - Continue Therapy   - Rheum follow up  - ID follow up    Elevated liver enzymes  Assessment & Plan  - Normal liver enzymes on admission  - CMP 7/30 no significant changes, AST:64, ALT:162  - Liver enzymes back to Normal limits on 8/1  - CTM             PLAN:    - Establish with a PCP   - Follow with Rheum and ID  -       Latent tuberculosis  Assessment & Plan  - Positive quantiferon, lived in [place] until 15 years ago.  - Never treated or told he has abnormal tuberculosis testing previously.  - ID recommended Rifampin 4 months   - Establish with a PCP as soon as possible   - Follow up outpatient in 2 weeks post DC with ID  - Patient tolerating well drug    Hypokalemia  Assessment & Plan  - Resolved  - Patient eating well  - K 3.7 on admission  - 8/1 3.8  - CTM and replete if needed    Constipation  Assessment & Plan  - Resolved  - Pt has had now 3-4 more BM since given suppository    Acute gout of knee- (present on admission)  Assessment & Plan  - Arthrocentesis showed few monosodium urate crystals  - Hx of monoarthritis in the past for about 10-12 years, not severe stating that never went to the Dr for this type of pain, it would resolve in 1-2 days with OTC NSAID's  - This presentation was the first time with Polyarthropathy   - We are treating as acute gout as well.  - Patient will need outpatient Rheum follow up   - Also needs to establish with a PCP  - Continues to improve in mobility, now walking independently outside his room with walker.  - 8/6 Patient able to walk w/o devices, now at baseline, knees and  feet no swelling or pain at all.  - Establish with a PCP  - CM no new updates

## 2020-08-07 NOTE — PROGRESS NOTES
Pt had repeated  Discharge education with couple of ,pt had lot of questions,answered all questions,pharmacy delivered only one med,pharmacy tech going to get other meds,awaiting for ride and meds.

## 2020-08-07 NOTE — PROGRESS NOTES
Bedside report received at 1900. Assessment done. VSS. AOx4. Unlabored and even breathing,RA. 5/10 hand pain at this time. Skin intact. 100% dinner finished. Hourly patient rounding done. Fall precautions in place.  Call light in place, bed locked and in lowest position. White board updated. Reviewed POC. All questions answered at this time. Daughter bedside

## 2020-08-07 NOTE — PROGRESS NOTES
Pt in bed awake,a&ox4,Guamanian speaking,pt seen by MD for d/c plan,explained to pt with the help of  Lewis.

## 2020-08-07 NOTE — DISCHARGE INSTRUCTIONS
"Artrosis  Osteoarthritis    La artrosis es un tipo de artritis que afecta el tejido que cubre los extremos de los huesos en las articulaciones (cartílago). El cartílago actúa candice amortiguador entre los huesos y los ayuda a moverse con suavidad. La artrosis se produce cuando el cartílago de las articulaciones se gasta. A veces, la artrosis se denomina artritis \"por uso y desgaste\".  La artrosis es la forma más frecuente de artritis. A menudo, afecta a las personas mayores. Es jasvir enfermedad que empeora con el tiempo (jasvir enfermedad progresiva). Esta enfermedad afecta con más frecuencia las articulaciones de:  · Los dedos de las rebecca.  · Los dedos de los pies.  · La cadera.  · Las rodillas.  · La columna vertebral, incluido el simon y la sandeep lumbar.  ¿Cuáles son las causas?  El desgaste del cartílago que cubre los extremos de los huesos relacionado con la edad, causa esta afección.  ¿Qué incrementa el riesgo?  Los siguientes factores pueden hacer que usted sea más propenso a tener esta afección:  · Edad avanzada.  · Tener exceso de peso u obesidad.  · Uso excesivo de las articulaciones, candice en el soco de los atletas.  · Lesión pasada de jasvir articulación.  · Cirugía pasada en javsir articulación.  · Antecedentes familiares de artrosis.  ¿Cuáles son los signos o los síntomas?  Los principales síntomas de esta enfermedad son dolor, hinchazón y rigidez en la articulación. Con el tiempo, la articulación puede perder bonilla forma. Pequeños trozos de hueso o cartílago se pueden desprender y flotar dentro de la articulación, lo cual puede causar más dolor y daño en la articulación. Pueden formarse pequeños depósitos de hueso (osteófitos) en los extremos de la articulación. Otros síntomas pueden incluir lo siguiente:  · Jasvir sensación de chirrido o raspado dentro de la articulación al moverla.  · Sonidos de chasquido o crujido al moverse.  Los síntomas pueden afectar jasvir o más articulaciones. La artrosis en jasvir articulación " principal, candice la rodilla o la cadera, puede causar dolor al caminar o al realizar ejercicio. Si tiene artrosis en las rebecca, es posible que no pueda agarrar objetos, torcer la mano o controlar pequeños movimientos de las rebecca y los dedos (motricidad leann).  ¿Cómo se diagnostica?  Esta afección se puede diagnosticar en función de lo siguiente:  · Diane antecedentes médicos.  · Un examen físico.  · Diane síntomas.  · Radiografías de la(s) articulación(es) afectada(s).  · Análisis de malena para descartar otros tipos de artritis.  ¿Cómo se trata?  No hay rut para esta enfermedad, jacqueline el tratamiento puede ayudar a controlar el dolor y mejorar el funcionamiento de la articulación. Los planes de tratamiento pueden incluir:  · Un programa de ejercicio indicado que permita el descanso y el alivio de la articulación. Puede trabajar con un fisioterapeuta.  · Un plan de control del peso.  · Técnicas de alivio del dolor, candice:  ? Aplicación de calor y frío en la articulación.  ? Impulsos eléctricos aplicados a las terminaciones nerviosas que se encuentran debajo de la piel (estimulación nerviosa eléctrica transcutánea o TENS).  ? Masajes.  ? Ciertos suplementos nutricionales.  · Antiinflamatorios no esteroideos (SADA) o medicamentos recetados para ayudar a aliviar el dolor.  · Medicamentos para ayudar a aliviar el dolor y la inflamación (corticoesteroides). Estos se pueden administrar por boca (vía oral) o mediante jasvir inyección.  · Dispositivos de ayuda, candice un dispositivo ortopédico, jasvir férula, un guante especial o un bastón.  · Cirugía, candice:  ? Jasvir osteotomía. Se hace para volver a posicionar los huesos y aliviar el dolor o para retirar los trozos sueltos de hueso y cartílago.  ? Cirugía de reemplazo articular. Es posible que necesite esta cirugía si tiene jasvir artrosis muy grave (avanzada).  Siga estas indicaciones en bonilla casa:  Actividad  · Descanse las articulaciones afectadas según las indicaciones del médico.  · No  conduzca ni use maquinaria pesada mientras eric analgésicos recetados.  · Josue ejercicio según le indiquen. Es posible que el médico o el fisioterapeuta le recomienden tipos específicos de ejercicio, tales candice:  ? Ejercicios de fortalecimiento. Se realizan para fortalecer los músculos que sostienen las articulaciones afectadas por la artritis. Pueden realizarse con peso o con bandas para agregar resistencia.  ? Ejercicios aeróbicos. Son ejercicios, candice caminar a paso ligero o hacer gimnasia aeróbica acuática, que aumentan la actividad del corazón.  ? Actividades de amplitud de movimientos. Facilitan el movimiento de las articulaciones.  ? Ejercicios de equilibrio y agilidad.  Control del dolor, la rigidez y la hinchazón         · Si se lo indican, aplique calor en la sandeep afectada con la frecuencia que le haya indicado el médico. Use la brayan de calor que el médico le recomiende, candice jasvir compresa de calor húmedo o jasvir almohadilla térmica.  ? Si tiene un dispositivo de ayuda que se puede quitar, quíteselo según lo indicado por bonilla médico.  ? Coloque jasvir toalla entre la piel y la brayan de calor. Si el médico le indica que no se quite el dispositivo de ayuda mientras se aplica calor, coloque jasvir toalla entre el dispositivo de ayuda y la brayan de calor.  ? Aplique el calor saul 20 a 30 minutos.  ? Retire la brayan de calor si la piel se pone de color bush brillante. Kosse es muy importante si no puede sentir dolor, calor o frío. Puede correr un riesgo mayor de sufrir quemaduras.  · Si se lo indican, aplique hielo sobre la articulación afectada:  ? Si tiene un dispositivo de ayuda que se puede quitar, quíteselo según lo indicado por bonilla médico.  ? Ponga el hielo en jasvir bolsa plástica.  ? Coloque jasvir toalla entre la piel y la bolsa de hielo. Si el médico le indica que no se quite el dispositivo de ayuda mientras se aplica hielo, coloque jasvir toalla entre el dispositivo de ayuda y la bolsa de hielo.  ? Coloque el  hielo saul 20 minutos, 2 a 3 veces por día.  Instrucciones generales  · [place] los medicamentos de venta quan y los recetados solamente candice se lo haya indicado el médico.  · Mantenga un peso saludable. Siga las instrucciones de bonilla médico con respecto al control de bonilla peso. Estas pueden incluir restricciones en la dieta.  · No consuma ningún producto que contenga nicotina o tabaco, candice cigarrillos y cigarrillos electrónicos. Estos pueden retrasar la consolidación del hueso. Si necesita ayuda para dejar de fumar, consulte al médico.  · Use los dispositivos de ayuda candice se lo haya indicado el médico.  · Concurra a todas las visitas de seguimiento candice se lo haya indicado el médico. Tees [place] es importante.  Dónde encontrar más información:  · Instituto Nacional de Reumatismo Articular y Enfermedades Musculoesqueléticas y Dermatológicas (National [place] of Arthritis and Musculoskeletal and Skin Diseases): www.niams.nih.gov  · Instituto Nacional sobre el Envejecimiento (National [place] on Aging): www.cindy.nih.gov  · Instituto Estadounidense de Reumatología (American College of Rheumatology): www.rheumatology.org  Comuníquese con un médico si:  · Bonilla piel se pone williams.  · Presenta jasvir erupción cutánea.  · Siente un dolor que empeora.  · Tiene fiebre y siente dolor en la articulación o el músculo.  Solicite ayuda de inmediato si:  · Pierde mucho peso.  · Pierde el apetito repentinamente.  · Transpira saul la noche.  Resumen  · La artrosis es jasvir clase de artritis que afecta el tejido que cubre los extremos de los huesos en las articulaciones (cartílago).  · El desgaste del cartílago que cubre los extremos de los huesos relacionado con la edad, causa esta afección.  · Los síntomas principales de esta enfermedad son dolor, hinchazón y rigidez en la articulación.  · No hay rut para esta enfermedad, jacqueline el tratamiento puede ayudar a controlar el dolor y mejorar el funcionamiento de la articulación.  Esta información  no tiene candice fin reemplazar el consejo del médico. Asegúrese de hacerle al médico cualquier pregunta que tenga.  Document Released: 09/27/2006 Document Revised: 03/18/2019 Document Reviewed: 08/25/2014  [employer] Patient Education © 2020 [employer]  Discharge Instructions    Discharged to home by car with relative. Discharged via wheelchair, hospital escort: Yes.  Special equipment needed: Not Applicable    Be sure to schedule a follow-up appointment with your primary care doctor or any specialists as instructed.     Discharge Plan:   Diet Plan: Discussed  Activity Level: Discussed  Smoking Cessation Offered: Patient Counseled  Confirmed Follow up Appointment: Patient to Call and Schedule Appointment  Confirmed Symptoms Management: Discussed  Medication Reconciliation Updated: Yes    I understand that a diet low in cholesterol, fat, and sodium is recommended for good health. Unless I have been given specific instructions below for another diet, I accept this instruction as my diet prescription.   Other diet:     Special Instructions: None    · Is patient discharged on Warfarin / Coumadin?   No     Depression / Suicide Risk    As you are discharged from this [facility] facility, it is important to learn how to keep safe from harming yourself.    Recognize the warning signs:  · Abrupt changes in personality, positive or negative- including increase in energy   · Giving away possessions  · Change in eating patterns- significant weight changes-  positive or negative  · Change in sleeping patterns- unable to sleep or sleeping all the time   · Unwillingness or inability to communicate  · Depression  · Unusual sadness, discouragement and loneliness  · Talk of wanting to die  · Neglect of personal appearance   · Rebelliousness- reckless behavior  · Withdrawal from people/activities they love  · Confusion- inability to concentrate     If you or a loved one observes any of these behaviors or has concerns about  self-harm, here's what you can do:  · Talk about it- your feelings and reasons for harming yourself  · Remove any means that you might use to hurt yourself (examples: pills, rope, extension cords, firearm)  · Get professional help from the community (Mental Health, Substance Abuse, psychological counseling)  · Do not be alone:Call your Safe Contact- someone whom you trust who will be there for you.  · Call your local CRISIS HOTLINE 346-8578 or 264-924-0308  · Call your local Children's Mobile Crisis Response Team [place] (244) 698-2131 or www.Beibamboo  · Call the toll free National Suicide Prevention Hotlines   · National Suicide Prevention Lifeline 494-378-QASS (0818)  · National Hope Line Network 800-SUICIDE (578-8641)

## 2020-08-07 NOTE — PROGRESS NOTES
Getting delayed with medicine delivery,pt is ok to get medicines as he has only one ride and the ride was going to leave .Pt a&ox4,no c/o pain,pt not in distress,discharge instructions &prescriptions given,pt able to follow instructions,questions answered,discharge without any events.pt taken down to his ride.

## 2020-08-07 NOTE — DISCHARGE PLANNING
Meds-to-Beds: Discharge prescription order listed below delivered to patient's bedside. RN notified. Patient counseled in Mozambican using  [place].  RN inquired about acetaminophen and naproxen. Case management contacted and approved naproxen. Bedside RN notified me that patient elected to go pick these up from [facility] on [address].      Fede Corado   Home Medication Instructions JOSE MANUEL:[id number]    Printed on:08/07/20 7131   Medication Information                      riFAMPin (RIFADINE) 300 MG Cap  Take 2 Caps by mouth every day for 30 days.               Melanie Laughlin, PharmD

## 2020-08-07 NOTE — CARE PLAN
Problem: Safety  Goal: Will remain free from falls  Outcome: PROGRESSING AS EXPECTED     Problem: Infection  Goal: Will remain free from infection  Outcome: PROGRESSING AS EXPECTED     Problem: Fluid Volume:  Goal: Will maintain balanced intake and output  Outcome: PROGRESSING AS EXPECTED     Problem: Respiratory:  Goal: Respiratory status will improve  Outcome: PROGRESSING AS EXPECTED     Problem: Bowel/Gastric:  Goal: Normal bowel function is maintained or improved  Outcome: PROGRESSING AS EXPECTED  Goal: Will not experience complications related to bowel motility  Outcome: PROGRESSING AS EXPECTED     Problem: Urinary Elimination:  Goal: Ability to reestablish a normal urinary elimination pattern will improve  Outcome: PROGRESSING AS EXPECTED     Problem: Skin Integrity  Goal: Risk for impaired skin integrity will decrease  Outcome: PROGRESSING AS EXPECTED     Problem: Mobility  Goal: Risk for activity intolerance will decrease  Outcome: PROGRESSING AS EXPECTED     Problem: Medication  Goal: Compliance with prescribed medication will improve  Outcome: PROGRESSING AS EXPECTED     Problem: Knowledge Deficit  Goal: Knowledge of disease process/condition, treatment plan, diagnostic tests, and medications will improve  Outcome: PROGRESSING AS EXPECTED  Goal: Knowledge of the prescribed therapeutic regimen will improve  Outcome: PROGRESSING AS EXPECTED     Problem: Discharge Barriers/Planning  Goal: Patient's continuum of care needs will be met  Outcome: PROGRESSING AS EXPECTED     Pain control, rheum op follow up, PCP set up, possible DC. Patient education on hand hygiene and infection prevention measures. Fall precautions in place, educated to call for assistance. POC discussed, verbalized understanding. Pain assessed, interventions appropriate.

## 2020-08-07 NOTE — DISCHARGE PLANNING
Received request for assistance with medications. RX Rifampin cost is over $200.00 and patient unable to afford and has no insurance and workers comp is still reviewing and case is pending. Approved services cost $34.63. Approved service form filled out and faxed to [facility] @ [phone]. Fahad RHOADES updated.

## 2020-09-08 ENCOUNTER — OFFICE VISIT (OUTPATIENT)
Dept: URGENT CARE | Facility: PHYSICIAN GROUP | Age: 60
End: 2020-09-08

## 2020-09-08 VITALS
BODY MASS INDEX: 25.82 KG/M2 | RESPIRATION RATE: 16 BRPM | HEART RATE: 86 BPM | SYSTOLIC BLOOD PRESSURE: 162 MMHG | OXYGEN SATURATION: 97 % | TEMPERATURE: 98.3 F | WEIGHT: 169.75 LBS | DIASTOLIC BLOOD PRESSURE: 98 MMHG

## 2020-09-08 DIAGNOSIS — R52 COMPLAINTS OF TOTAL BODY PAIN: ICD-10-CM

## 2020-09-08 PROCEDURE — 99204 OFFICE O/P NEW MOD 45 MIN: CPT | Performed by: PHYSICIAN ASSISTANT

## 2020-09-08 RX ORDER — PREDNISONE 10 MG/1
TABLET ORAL
Qty: 1 QUANTITY SUFFICIENT | Refills: 0 | Status: SHIPPED | OUTPATIENT
Start: 2020-09-08 | End: 2020-10-01

## 2020-09-08 ASSESSMENT — FIBROSIS 4 INDEX: FIB4 SCORE: 0.34

## 2020-09-08 NOTE — PROGRESS NOTES
Chief Complaint   Patient presents with   • Back Pain     severe lower back pain, hand.       HISTORY OF PRESENT ILLNESS: Patient is a 59 y.o. male who presents today for the following:    Patient is here with his friend for evaluation of severe neck and back pain.  Patient reports bending over at work 7/22, describing immediately neck pain that extended to his back and hands.  He was apparently evaluated through Worker's Compensation.  There is a letter showing denial from a date of injury 7/19/2020.  Patient was apparently hospitalized 7/25/2020 for symmetric polyarthritis of his ankles, knees, wrists, left shoulder, and left elbow in the setting of leukocytosis, elevated inflammatory markers, and occasional night sweats.  Joint aspiration was performed and notable for an inflammatory aspirate with 10-15,000 WBCs with neutrophil predominance as well as a few monosodium urate crystals.  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  Patient gradually improved during the hospitalization but was required placement at a skilled nursing facility to improve his mobility.  Work-up included autoimmune serologies, RPR, gonorrhea/chlamydia, blood and joint cultures, Lyme serologies, all of which were negative.  He had a positive QuantiFERON test with no respiratory symptoms and a negative chest x-ray indicating latent tuberculosis for which he was started on rifampin.  He was discharged 8/7/2020 and recommended follow-up with primary care as well as rheumatology and infectious disease referrals.    Patient was discharged from the hospital but not feel he was well enough to be discharged.  Apparently does not currently have any health insurance and was unable to go into a skilled nursing facility.  He complains of severe pain diffusely.  He denies any further injuries, fever, night sweats, chills, body aches, swelling,  bruising, skin changes.  He states the pain is the worst in the neck and back, bilateral hands and wrists, and bilateral lower legs, diffusely and circumferentially.  He has a difficult time with any mobility and has been unable to work.    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    Current Outpatient Medications Ordered in Epic   Medication Sig Dispense Refill   • predniSONE (DELTASONE) 10 MG Tab 60 mg x 2 days; 50 mg x 2 days; 40 mg x 2 days; 20 mg x 3 days; 10 mg x 2 days; 5 mg x 2 days 1 Quantity Sufficient 0   • riFAMPin (RIFADINE) 300 MG Cap Take 2 Capsules by mouth every day. 60 Cap 11   • acetaminophen (TYLENOL) 500 MG Tab Take 2 Tabs by mouth 3 times a day. 30 Tab 0   • polyethylene glycol/lytes (MIRALAX) 17 g Pack Take 1 Packet by mouth every day. 30 Each 0   • lidocaine (LIDODERM) 5 % Patch Apply 1 Patch to skin as directed every 24 hours. 10 Patch 0   • omeprazole (PRILOSEC) 20 MG delayed-release capsule Take 1 Cap by mouth every day. 30 Cap 0     No current Epic-ordered facility-administered medications on file.        Past Medical History:   Diagnosis Date   • Arthritis        Social History     Tobacco Use   • Smoking status: Current Every Day Smoker     Packs/day: 0.10     Years: 25.00     Pack years: 2.50     Types: Cigarettes   • Smokeless tobacco: Never Used   Substance Use Topics   • Alcohol use: Yes     Frequency: 4 or more times a week     Drinks per session: 1 or 2     Comment: 2-3 beers daily   • Drug use: Not Currently       No family status information on file.   History reviewed. No pertinent family history.    Review of Systems:     Constitutional ROS: No unexpected change in weight, No weakness, No fatigue  Pulmonary ROS: No chronic cough, sputum, or hemoptysis, No dyspnea on exertion, No wheezing  Cardiovascular ROS: No diaphoresis, No edema, No palpitations  Musculoskeletal/Extremities ROS: Diffuse body pain.  Hematologic/Lymphatic ROS: No chills, No night sweats, No weight loss  Skin/Integumentary ROS: No edema, No evidence of rash, No itching      Exam:  BP (!) 162/98   Pulse 86   Temp 36.8 °C (98.3 °F) (Temporal)   Resp 16   Wt 77 kg (169 lb 12.1 oz)   SpO2 97%   General: Well developed, well nourished. No distress.    HENT: Head is grossly normal.  Pulmonary: Unlabored respiratory effort.   Neurologic: Grossly nonfocal. No facial asymmetry noted.  Musculoskeletal: Diffuse tenderness of the neck and back, and the soft tissue and bony prominences, none worse than the other.  No obvious edema and no skin changes noted on the neck or back.  Diffuse tenderness of the extremities, worse from the wrists distally and from the knees distally, circumferentially on all 4 extremities.  No erythema, edema, or evidence of vascular compromise is noted.  Patient is unable to make a fist.  Skin: Warm, dry, good turgor. No rashes in visible areas.   Psych: Normal mood. Alert and oriented to person, place and time.    Assessment/Plan:  Reviewed patient's hospitalization thoroughly including imaging, labs, and physician notes.    Discussed with the patient and his friend that his symptoms are too complex for the urgent care and needs further work-up from a specialist.  Placing referral to physiatry.  Feel steroids may benefit from a pain perspective although advised to the patient that this is only temporary and is not something that should be continued for long-term.  Discussed red flags and ER precautions.    Entire clinic visit was conducted using  #[id number], [place].  1. Complaints of total body pain  REFERRAL TO PHYSIATRY (PMR)     predniSONE (DELTASONE) 10 MG Tab

## 2020-09-18 ENCOUNTER — HOSPITAL ENCOUNTER (OUTPATIENT)
Dept: LAB | Facility: MEDICAL CENTER | Age: 60
End: 2020-09-18
Attending: INTERNAL MEDICINE

## 2020-09-18 LAB
ALBUMIN SERPL BCP-MCNC: 4.1 G/DL (ref 3.2–4.9)
ALBUMIN/GLOB SERPL: 1.5 G/DL
ALP SERPL-CCNC: 148 U/L (ref 30–99)
ALT SERPL-CCNC: 22 U/L (ref 2–50)
ANION GAP SERPL CALC-SCNC: 14 MMOL/L (ref 7–16)
AST SERPL-CCNC: 18 U/L (ref 12–45)
BILIRUB SERPL-MCNC: 0.2 MG/DL (ref 0.1–1.5)
BUN SERPL-MCNC: 11 MG/DL (ref 8–22)
CALCIUM SERPL-MCNC: 8.6 MG/DL (ref 8.5–10.5)
CHLORIDE SERPL-SCNC: 102 MMOL/L (ref 96–112)
CO2 SERPL-SCNC: 22 MMOL/L (ref 20–33)
CREAT SERPL-MCNC: 0.8 MG/DL (ref 0.5–1.4)
CRP SERPL HS-MCNC: 0.08 MG/DL (ref 0–0.75)
GLOBULIN SER CALC-MCNC: 2.8 G/DL (ref 1.9–3.5)
GLUCOSE SERPL-MCNC: 117 MG/DL (ref 65–99)
POTASSIUM SERPL-SCNC: 3.8 MMOL/L (ref 3.6–5.5)
PROT SERPL-MCNC: 6.9 G/DL (ref 6–8.2)
SODIUM SERPL-SCNC: 138 MMOL/L (ref 135–145)

## 2020-09-18 PROCEDURE — 36415 COLL VENOUS BLD VENIPUNCTURE: CPT

## 2020-09-18 PROCEDURE — 86140 C-REACTIVE PROTEIN: CPT

## 2020-09-18 PROCEDURE — 80053 COMPREHEN METABOLIC PANEL: CPT

## 2020-10-01 ENCOUNTER — APPOINTMENT (OUTPATIENT)
Dept: RADIOLOGY | Facility: MEDICAL CENTER | Age: 60
End: 2020-10-01
Attending: EMERGENCY MEDICINE
Payer: COMMERCIAL

## 2020-10-01 ENCOUNTER — APPOINTMENT (OUTPATIENT)
Dept: RADIOLOGY | Facility: MEDICAL CENTER | Age: 60
End: 2020-10-01
Attending: HOSPITALIST

## 2020-10-01 ENCOUNTER — APPOINTMENT (OUTPATIENT)
Dept: RADIOLOGY | Facility: MEDICAL CENTER | Age: 60
End: 2020-10-01
Attending: HOSPITALIST
Payer: COMMERCIAL

## 2020-10-01 ENCOUNTER — HOSPITAL ENCOUNTER (OUTPATIENT)
Facility: MEDICAL CENTER | Age: 60
End: 2020-10-03
Attending: EMERGENCY MEDICINE | Admitting: HOSPITALIST
Payer: COMMERCIAL

## 2020-10-01 DIAGNOSIS — M10.9 ACUTE GOUT OF KNEE, UNSPECIFIED CAUSE, UNSPECIFIED LATERALITY: ICD-10-CM

## 2020-10-01 DIAGNOSIS — M25.50 POLYARTHRALGIA: ICD-10-CM

## 2020-10-01 DIAGNOSIS — R26.81 GAIT INSTABILITY: ICD-10-CM

## 2020-10-01 PROBLEM — R10.13 DYSPEPSIA: Status: ACTIVE | Noted: 2020-10-01

## 2020-10-01 LAB
ALBUMIN SERPL BCP-MCNC: 4.3 G/DL (ref 3.2–4.9)
ALBUMIN/GLOB SERPL: 1.3 G/DL
ALP SERPL-CCNC: 143 U/L (ref 30–99)
ALT SERPL-CCNC: 14 U/L (ref 2–50)
ANION GAP SERPL CALC-SCNC: 15 MMOL/L (ref 7–16)
AST SERPL-CCNC: 13 U/L (ref 12–45)
BASOPHILS # BLD AUTO: 0.9 % (ref 0–1.8)
BASOPHILS # BLD: 0.08 K/UL (ref 0–0.12)
BILIRUB SERPL-MCNC: 0.3 MG/DL (ref 0.1–1.5)
BUN SERPL-MCNC: 10 MG/DL (ref 8–22)
CALCIUM SERPL-MCNC: 9.5 MG/DL (ref 8.5–10.5)
CHLORIDE SERPL-SCNC: 103 MMOL/L (ref 96–112)
CO2 SERPL-SCNC: 21 MMOL/L (ref 20–33)
COVID ORDER STATUS COVID19: NORMAL
CREAT SERPL-MCNC: 0.71 MG/DL (ref 0.5–1.4)
CRP SERPL HS-MCNC: 9.4 MG/L (ref 0–7.5)
EOSINOPHIL # BLD AUTO: 0.23 K/UL (ref 0–0.51)
EOSINOPHIL NFR BLD: 2.6 % (ref 0–6.9)
ERYTHROCYTE [DISTWIDTH] IN BLOOD BY AUTOMATED COUNT: 41.2 FL (ref 35.9–50)
ERYTHROCYTE [SEDIMENTATION RATE] IN BLOOD BY WESTERGREN METHOD: 32 MM/HOUR (ref 0–20)
GLOBULIN SER CALC-MCNC: 3.3 G/DL (ref 1.9–3.5)
GLUCOSE SERPL-MCNC: 110 MG/DL (ref 65–99)
HCT VFR BLD AUTO: 37.6 % (ref 42–52)
HGB BLD-MCNC: 13 G/DL (ref 14–18)
IMM GRANULOCYTES # BLD AUTO: 0.04 K/UL (ref 0–0.11)
IMM GRANULOCYTES NFR BLD AUTO: 0.5 % (ref 0–0.9)
LYMPHOCYTES # BLD AUTO: 2.45 K/UL (ref 1–4.8)
LYMPHOCYTES NFR BLD: 28.2 % (ref 22–41)
MCH RBC QN AUTO: 29 PG (ref 27–33)
MCHC RBC AUTO-ENTMCNC: 34.6 G/DL (ref 33.7–35.3)
MCV RBC AUTO: 83.9 FL (ref 81.4–97.8)
MONOCYTES # BLD AUTO: 0.65 K/UL (ref 0–0.85)
MONOCYTES NFR BLD AUTO: 7.5 % (ref 0–13.4)
NEUTROPHILS # BLD AUTO: 5.24 K/UL (ref 1.82–7.42)
NEUTROPHILS NFR BLD: 60.3 % (ref 44–72)
NRBC # BLD AUTO: 0 K/UL
NRBC BLD-RTO: 0 /100 WBC
PLATELET # BLD AUTO: 329 K/UL (ref 164–446)
PMV BLD AUTO: 10.1 FL (ref 9–12.9)
POTASSIUM SERPL-SCNC: 3.6 MMOL/L (ref 3.6–5.5)
PROT SERPL-MCNC: 7.6 G/DL (ref 6–8.2)
RBC # BLD AUTO: 4.48 M/UL (ref 4.7–6.1)
SODIUM SERPL-SCNC: 139 MMOL/L (ref 135–145)
WBC # BLD AUTO: 8.7 K/UL (ref 4.8–10.8)

## 2020-10-01 PROCEDURE — C9803 HOPD COVID-19 SPEC COLLECT: HCPCS | Performed by: HOSPITALIST

## 2020-10-01 PROCEDURE — 96375 TX/PRO/DX INJ NEW DRUG ADDON: CPT

## 2020-10-01 PROCEDURE — 85652 RBC SED RATE AUTOMATED: CPT

## 2020-10-01 PROCEDURE — A9270 NON-COVERED ITEM OR SERVICE: HCPCS | Performed by: HOSPITALIST

## 2020-10-01 PROCEDURE — 85025 COMPLETE CBC W/AUTO DIFF WBC: CPT

## 2020-10-01 PROCEDURE — 700102 HCHG RX REV CODE 250 W/ 637 OVERRIDE(OP): Performed by: HOSPITALIST

## 2020-10-01 PROCEDURE — 99285 EMERGENCY DEPT VISIT HI MDM: CPT

## 2020-10-01 PROCEDURE — 72128 CT CHEST SPINE W/O DYE: CPT

## 2020-10-01 PROCEDURE — 700111 HCHG RX REV CODE 636 W/ 250 OVERRIDE (IP): Performed by: HOSPITALIST

## 2020-10-01 PROCEDURE — 72131 CT LUMBAR SPINE W/O DYE: CPT

## 2020-10-01 PROCEDURE — G0378 HOSPITAL OBSERVATION PER HR: HCPCS

## 2020-10-01 PROCEDURE — 80053 COMPREHEN METABOLIC PANEL: CPT

## 2020-10-01 PROCEDURE — 96374 THER/PROPH/DIAG INJ IV PUSH: CPT

## 2020-10-01 PROCEDURE — U0003 INFECTIOUS AGENT DETECTION BY NUCLEIC ACID (DNA OR RNA); SEVERE ACUTE RESPIRATORY SYNDROME CORONAVIRUS 2 (SARS-COV-2) (CORONAVIRUS DISEASE [COVID-19]), AMPLIFIED PROBE TECHNIQUE, MAKING USE OF HIGH THROUGHPUT TECHNOLOGIES AS DESCRIBED BY CMS-2020-01-R: HCPCS

## 2020-10-01 PROCEDURE — 99219 PR INITIAL OBSERVATION CARE,LEVL II: CPT | Performed by: HOSPITALIST

## 2020-10-01 PROCEDURE — 86200 CCP ANTIBODY: CPT

## 2020-10-01 PROCEDURE — 86038 ANTINUCLEAR ANTIBODIES: CPT

## 2020-10-01 PROCEDURE — 86141 C-REACTIVE PROTEIN HS: CPT

## 2020-10-01 PROCEDURE — 700111 HCHG RX REV CODE 636 W/ 250 OVERRIDE (IP): Performed by: EMERGENCY MEDICINE

## 2020-10-01 RX ORDER — ACETAMINOPHEN 325 MG/1
650 TABLET ORAL EVERY 6 HOURS PRN
Status: DISCONTINUED | OUTPATIENT
Start: 2020-10-01 | End: 2020-10-03 | Stop reason: HOSPADM

## 2020-10-01 RX ORDER — OXYCODONE HYDROCHLORIDE 5 MG/1
5 TABLET ORAL
Status: DISCONTINUED | OUTPATIENT
Start: 2020-10-01 | End: 2020-10-03 | Stop reason: HOSPADM

## 2020-10-01 RX ORDER — PROMETHAZINE HYDROCHLORIDE 12.5 MG/1
12.5-25 SUPPOSITORY RECTAL EVERY 4 HOURS PRN
Status: DISCONTINUED | OUTPATIENT
Start: 2020-10-01 | End: 2020-10-03 | Stop reason: HOSPADM

## 2020-10-01 RX ORDER — RIFAMPIN 300 MG/1
300 CAPSULE ORAL 2 TIMES DAILY
COMMUNITY

## 2020-10-01 RX ORDER — ONDANSETRON 2 MG/ML
4 INJECTION INTRAMUSCULAR; INTRAVENOUS EVERY 4 HOURS PRN
Status: DISCONTINUED | OUTPATIENT
Start: 2020-10-01 | End: 2020-10-03 | Stop reason: HOSPADM

## 2020-10-01 RX ORDER — KETOROLAC TROMETHAMINE 30 MG/ML
30 INJECTION, SOLUTION INTRAMUSCULAR; INTRAVENOUS ONCE
Status: DISCONTINUED | OUTPATIENT
Start: 2020-10-01 | End: 2020-10-01

## 2020-10-01 RX ORDER — ONDANSETRON 2 MG/ML
4 INJECTION INTRAMUSCULAR; INTRAVENOUS ONCE
Status: COMPLETED | OUTPATIENT
Start: 2020-10-01 | End: 2020-10-01

## 2020-10-01 RX ORDER — AMOXICILLIN 250 MG
2 CAPSULE ORAL 2 TIMES DAILY
Status: DISCONTINUED | OUTPATIENT
Start: 2020-10-01 | End: 2020-10-03 | Stop reason: HOSPADM

## 2020-10-01 RX ORDER — PROMETHAZINE HYDROCHLORIDE 25 MG/1
12.5-25 TABLET ORAL EVERY 4 HOURS PRN
Status: DISCONTINUED | OUTPATIENT
Start: 2020-10-01 | End: 2020-10-03 | Stop reason: HOSPADM

## 2020-10-01 RX ORDER — PROCHLORPERAZINE EDISYLATE 5 MG/ML
5-10 INJECTION INTRAMUSCULAR; INTRAVENOUS EVERY 4 HOURS PRN
Status: DISCONTINUED | OUTPATIENT
Start: 2020-10-01 | End: 2020-10-03 | Stop reason: HOSPADM

## 2020-10-01 RX ORDER — OMEPRAZOLE 20 MG/1
20 CAPSULE, DELAYED RELEASE ORAL DAILY
Status: DISCONTINUED | OUTPATIENT
Start: 2020-10-02 | End: 2020-10-03 | Stop reason: HOSPADM

## 2020-10-01 RX ORDER — SIMETHICONE 80 MG
80 TABLET,CHEWABLE ORAL 3 TIMES DAILY PRN
Status: DISCONTINUED | OUTPATIENT
Start: 2020-10-01 | End: 2020-10-03 | Stop reason: HOSPADM

## 2020-10-01 RX ORDER — OXYCODONE HYDROCHLORIDE 5 MG/1
2.5 TABLET ORAL
Status: DISCONTINUED | OUTPATIENT
Start: 2020-10-01 | End: 2020-10-03 | Stop reason: HOSPADM

## 2020-10-01 RX ORDER — KETOROLAC TROMETHAMINE 30 MG/ML
30 INJECTION, SOLUTION INTRAMUSCULAR; INTRAVENOUS EVERY 6 HOURS PRN
Status: DISCONTINUED | OUTPATIENT
Start: 2020-10-01 | End: 2020-10-03 | Stop reason: HOSPADM

## 2020-10-01 RX ORDER — ACETAMINOPHEN 325 MG/1
650 TABLET ORAL EVERY 4 HOURS PRN
COMMUNITY

## 2020-10-01 RX ORDER — BISACODYL 10 MG
10 SUPPOSITORY, RECTAL RECTAL
Status: DISCONTINUED | OUTPATIENT
Start: 2020-10-01 | End: 2020-10-03 | Stop reason: HOSPADM

## 2020-10-01 RX ORDER — MORPHINE SULFATE 4 MG/ML
4 INJECTION, SOLUTION INTRAMUSCULAR; INTRAVENOUS ONCE
Status: COMPLETED | OUTPATIENT
Start: 2020-10-01 | End: 2020-10-01

## 2020-10-01 RX ORDER — RIFAMPIN 300 MG/1
300 CAPSULE ORAL 2 TIMES DAILY
Status: DISCONTINUED | OUTPATIENT
Start: 2020-10-01 | End: 2020-10-03 | Stop reason: HOSPADM

## 2020-10-01 RX ORDER — KETOROLAC TROMETHAMINE 30 MG/ML
30 INJECTION, SOLUTION INTRAMUSCULAR; INTRAVENOUS ONCE
Status: COMPLETED | OUTPATIENT
Start: 2020-10-01 | End: 2020-10-01

## 2020-10-01 RX ORDER — POLYETHYLENE GLYCOL 3350 17 G/17G
1 POWDER, FOR SOLUTION ORAL
Status: DISCONTINUED | OUTPATIENT
Start: 2020-10-01 | End: 2020-10-03 | Stop reason: HOSPADM

## 2020-10-01 RX ORDER — HYDROMORPHONE HYDROCHLORIDE 1 MG/ML
0.25 INJECTION, SOLUTION INTRAMUSCULAR; INTRAVENOUS; SUBCUTANEOUS
Status: DISCONTINUED | OUTPATIENT
Start: 2020-10-01 | End: 2020-10-03 | Stop reason: HOSPADM

## 2020-10-01 RX ORDER — ONDANSETRON 4 MG/1
4 TABLET, ORALLY DISINTEGRATING ORAL EVERY 4 HOURS PRN
Status: DISCONTINUED | OUTPATIENT
Start: 2020-10-01 | End: 2020-10-03 | Stop reason: HOSPADM

## 2020-10-01 RX ADMIN — RIFAMPIN 300 MG: 300 CAPSULE ORAL at 22:09

## 2020-10-01 RX ADMIN — HYDROMORPHONE HYDROCHLORIDE 0.25 MG: 1 INJECTION, SOLUTION INTRAMUSCULAR; INTRAVENOUS; SUBCUTANEOUS at 21:39

## 2020-10-01 RX ADMIN — DOCUSATE SODIUM 50 MG AND SENNOSIDES 8.6 MG 2 TABLET: 8.6; 5 TABLET, FILM COATED ORAL at 21:39

## 2020-10-01 RX ADMIN — OXYCODONE 5 MG: 5 TABLET ORAL at 22:21

## 2020-10-01 RX ADMIN — ONDANSETRON 4 MG: 2 INJECTION INTRAMUSCULAR; INTRAVENOUS at 17:01

## 2020-10-01 RX ADMIN — MORPHINE SULFATE 4 MG: 4 INJECTION INTRAVENOUS at 17:02

## 2020-10-01 RX ADMIN — KETOROLAC TROMETHAMINE 30 MG: 30 INJECTION, SOLUTION INTRAMUSCULAR at 14:53

## 2020-10-01 ASSESSMENT — LIFESTYLE VARIABLES
ON A TYPICAL DAY WHEN YOU DRINK ALCOHOL HOW MANY DRINKS DO YOU HAVE: 2
HOW MANY TIMES IN THE PAST YEAR HAVE YOU HAD 5 OR MORE DRINKS IN A DAY: 0
TOTAL SCORE: 0
CONSUMPTION TOTAL: NEGATIVE
AVERAGE NUMBER OF DAYS PER WEEK YOU HAVE A DRINK CONTAINING ALCOHOL: 2
HAVE YOU EVER FELT YOU SHOULD CUT DOWN ON YOUR DRINKING: NO
ALCOHOL_USE: YES
DOES PATIENT WANT TO STOP DRINKING: NO
EVER FELT BAD OR GUILTY ABOUT YOUR DRINKING: NO
HAVE PEOPLE ANNOYED YOU BY CRITICIZING YOUR DRINKING: NO
EVER HAD A DRINK FIRST THING IN THE MORNING TO STEADY YOUR NERVES TO GET RID OF A HANGOVER: NO

## 2020-10-01 ASSESSMENT — FIBROSIS 4 INDEX: FIB4 SCORE: 0.48

## 2020-10-01 ASSESSMENT — PATIENT HEALTH QUESTIONNAIRE - PHQ9
SUM OF ALL RESPONSES TO PHQ QUESTIONS 1-9: 2
5. POOR APPETITE OR OVEREATING: NOT AT ALL
4. FEELING TIRED OR HAVING LITTLE ENERGY: NOT AT ALL
2. FEELING DOWN, DEPRESSED, IRRITABLE, OR HOPELESS: SEVERAL DAYS
9. THOUGHTS THAT YOU WOULD BE BETTER OFF DEAD, OR OF HURTING YOURSELF: NOT AT ALL
1. LITTLE INTEREST OR PLEASURE IN DOING THINGS: NOT AT ALL
8. MOVING OR SPEAKING SO SLOWLY THAT OTHER PEOPLE COULD HAVE NOTICED. OR THE OPPOSITE, BEING SO FIGETY OR RESTLESS THAT YOU HAVE BEEN MOVING AROUND A LOT MORE THAN USUAL: NOT AT ALL
6. FEELING BAD ABOUT YOURSELF - OR THAT YOU ARE A FAILURE OR HAVE LET YOURSELF OR YOUR FAMILY DOWN: NOT AL ALL
SUM OF ALL RESPONSES TO PHQ9 QUESTIONS 1 AND 2: 1
3. TROUBLE FALLING OR STAYING ASLEEP OR SLEEPING TOO MUCH: SEVERAL DAYS
7. TROUBLE CONCENTRATING ON THINGS, SUCH AS READING THE NEWSPAPER OR WATCHING TELEVISION: NOT AT ALL

## 2020-10-01 ASSESSMENT — PAIN DESCRIPTION - PAIN TYPE: TYPE: ACUTE PAIN

## 2020-10-01 ASSESSMENT — PAIN SCALES - WONG BAKER
WONGBAKER_NUMERICALRESPONSE: HURTS A LITTLE MORE
WONGBAKER_NUMERICALRESPONSE: HURTS A LITTLE MORE

## 2020-10-01 NOTE — LETTER
[facility], EMERGENCY DEPT   [address], [place] [address]  Phone: Dept: [phone] - Fax:        [facility] Progress Report and Disability Certification  Date of Service: 10/1/2020   No Show:  No  Date / Time of Next Visit:     Claim Information   Patient Name: Fede Carrillo  Claim Number:     Employer:    Date of Injury: 7/19/2020     Insurer / TPA:   ID / SSN: [id number]    Occupation: Goat Dairy Milker Diagnosis: Diagnoses of Polyarthralgia and Gait instability were pertinent to this visit.    Medical Information   Related to Industrial Injury?   ***   Subjective Complaints:      Objective Findings:     Pre-Existing Condition(s):     Assessment:        Status:    Permanent Disability:     Plan:      Diagnostics:      Comments:       Disability Information   Status:      From:     Through:   Restrictions are:     Physical Restrictions   Sitting:    Standing:    Stooping:    Bending:      Squatting:    Walking:    Climbing:    Pushing:      Pulling:    Other:    Reaching Above Shoulder (L):   Reaching Above Shoulder (R):       Reaching Below Shoulder (L):    Reaching Below Shoulder (R):      Not to exceed Weight Limits   Carrying(hrs):   Weight Limit(lb):   Lifting(hrs):   Weight  Limit(lb):     Comments:      Repetitive Actions   Hands: i.e. Fine Manipulations from Grasping:     Feet: i.e. Operating Foot Controls:     Driving / Operate Machinery:     Physician Name: Killian Lucas Physician Signature:   e-Signature:  , Medical Director   Clinic Name / Location: [facility], EMERGENCY DEPT  [address] [id number] [phone]     Clinic Phone Number: Dept: [phone]   Appointment Time:  Visit Start Time:    Check-In Time:  12:32 PM Visit Discharge Time:    Original-Treating Physician or Chiropractor    Page 2-Insurer/TPA    Page 3-Employer    Page  4-Employee

## 2020-10-01 NOTE — LETTER
[facility], EMERGENCY DEPT   [address], [place] [address]  Phone: Dept: [phone] - Fax:        [facility] Progress Report and Disability Certification  Date of Service: 10/1/2020   No Show:  No  Date / Time of Next Visit:     Claim Information   Patient Name: Fede Carrillo  Claim Number:     Employer:   [employer] Date of Injury: 7/19/2020     Insurer / TPA:  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    Medical Information   Related to Industrial Injury?   Comments:Unknown, difficult to blame multiple areas of arthralgia on an accident 4 years ago with exacerbation 2 months ago    Subjective Complaints:  Patient complaining of multiple areas of arthralgia, inability to walk secondary to pain in his neck, back, left hip, left knee, some extent ankles.  He states it was a work-related injury 4 years ago, he states 2 months ago at work he felt a twinge of pain in his back which is worsened.  Patient is currently on prednisone.  With help of , I am unable to identify a new accident at work.   Objective Findings: Polyarthralgia, with pain in thoracic and lumbar spine, left shoulder, left knee, bilateral hips.  Very difficult for patient to transfer from wheelchair to bed secondary to arthralgia.  Pain is unresponsive to Toradol and morphine.   Pre-Existing Condition(s): Patient states back injury 4 years ago   Assessment:   Condition Worsened    Status: Additional Care Required  Comments:Patient hospitalized  Permanent Disability:  Comments:Unknown    Plan:   Comments:Admitted to the hospital for ongoing work-up, patient with multiple elevated inflammatory markers    Diagnostics: CTLaboratory  Comments:CT scan thoracic and lumbar spine evidence of degenerative disease, no acute  fracture, no destructive lesions    Comments:  Elevated sedimentation rate and CRP, consider autoimmune disorder, rheumatologic disease    Disability Information   Status: Temporarily Totally Disabled    From:     Through:   Restrictions are: Temporary  Comments:Patient requires further work-up as to the exact etiology of his disability.   Physical Restrictions   Sitting:    Standing:    Stooping:    Bending:      Squatting:    Walking:    Climbing:    Pushing:      Pulling:    Other:    Reaching Above Shoulder (L):   Reaching Above Shoulder (R):       Reaching Below Shoulder (L):    Reaching Below Shoulder (R):      Not to exceed Weight Limits   Carrying(hrs):   Weight Limit(lb):   Lifting(hrs):   Weight  Limit(lb):     Comments: It is unknown whether his disability today, the polyarthralgia and increased inflammatory markers, are related to a work injury or not.  It seems given the multiple areas it is multifactorial and difficult to blame this completely on a work-related injury.  I am unable to answer several of the questions above given the lack of definitive diagnosis.  Patient is hospitalized as he is currently unable to walk secondary to pain for further evaluation and work-up    Repetitive Actions   Hands: i.e. Fine Manipulations from Grasping:     Feet: i.e. Operating Foot Controls:     Driving / Operate Machinery:     Physician Name: Killian Lucas Physician Signature: KILLIAN Judd M.D. e-Signature:  , Medical Director   Clinic Name / Location: [facility], EMERGENCY DEPT  [address] [place] [address]  [phone]     Clinic Phone Number: Dept: [phone]   Appointment Time:  Visit Start Time:    Check-In Time:  12:32 PM Visit Discharge Time:    Original-Treating Physician or Chiropractor    Page 2-Insurer/TPA    Page 3-Employer    Page 4-Employee

## 2020-10-01 NOTE — LETTER
[facility], EMERGENCY DEPT   [address], [place] [address]  Phone: Dept: [phone] - Fax:        [facility] Progress Report and Disability Certification  Date of Service: 10/1/2020   No Show:  No  Date / Time of Next Visit:     Claim Information   Patient Name: Fede Carrillo  Claim Number:     Employer:    Date of Injury: 7/19/2020     Insurer / TPA:   ID / SSN: [id number]    Occupation: Goat Dairy Milker Diagnosis: There were no encounter diagnoses.    Medical Information   Related to Industrial Injury?   ***   Subjective Complaints:      Objective Findings:     Pre-Existing Condition(s):     Assessment:        Status:    Permanent Disability:     Plan:      Diagnostics:      Comments:       Disability Information   Status:      From:     Through:   Restrictions are:     Physical Restrictions   Sitting:    Standing:    Stooping:    Bending:      Squatting:    Walking:    Climbing:    Pushing:      Pulling:    Other:    Reaching Above Shoulder (L):   Reaching Above Shoulder (R):       Reaching Below Shoulder (L):    Reaching Below Shoulder (R):      Not to exceed Weight Limits   Carrying(hrs):   Weight Limit(lb):   Lifting(hrs):   Weight  Limit(lb):     Comments:      Repetitive Actions   Hands: i.e. Fine Manipulations from Grasping:     Feet: i.e. Operating Foot Controls:     Driving / Operate Machinery:     Physician Name: Killian Lucas Physician Signature:   e-Signature:  , Medical Director   Clinic Name / Location: [facility], EMERGENCY DEPT  [address] [facility] [id number] [phone]     Clinic Phone Number: Dept: [phone]   Appointment Time:  Visit Start Time:    Check-In Time:  12:32 PM Visit Discharge Time:    Original-Treating Physician or Chiropractor    Page 2-Insurer/TPA    Page 3-Employer    Page 4-Employee

## 2020-10-01 NOTE — LETTER
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    Medical Information   Related to Industrial Injury?   Comments:Unknown, difficult to blame multiple areas of arthralgia on an accident 4 years ago with exacerbation 2 months ago ***   Subjective Complaints:  Patient complaining of multiple areas of arthralgia, inability to walk secondary to pain in his neck, back, left hip, left knee, some extent ankles.  He states it was a work-related injury 4 years ago, he states 2 months ago at work he felt a twinge of pain in his back which is worsened.  Patient is currently on prednisone.  With help of , I am unable to identify a new accident at work.   Objective Findings: Polyarthralgia, with pain in thoracic and lumbar spine, left shoulder, left knee, bilateral hips.  Very difficult for patient to transfer from wheelchair to bed secondary to arthralgia.  Pain is unresponsive to Toradol and morphine.   Pre-Existing Condition(s): Patient states back injury 4 years ago   Assessment:   Condition Worsened    Status: Additional Care Required  Comments:Patient hospitalized  Permanent Disability:  Comments:Unknown    Plan:   Comments:Admitted to the hospital for ongoing work-up, patient with multiple elevated inflammatory markers    Diagnostics: CTLaboratory  Comments:CT scan thoracic and lumbar spine evidence of degenerative disease, no acute fracture, no destructive lesions       Comments:  Elevated sedimentation rate and CRP, consider autoimmune disorder, rheumatologic disease    Disability Information   Status: Temporarily Totally Disabled    From:     Through:   Restrictions are: Temporary  Comments:Patient requires further work-up as to the exact etiology of his disability.   Physical Restrictions   Sitting:    Standing:    Stooping:    Bending:      Squatting:    Walking:    Climbing:    Pushing:      Pulling:    Other:    Reaching Above Shoulder (L):   Reaching Above Shoulder (R):       Reaching Below Shoulder (L):    Reaching Below Shoulder (R):      Not to exceed Weight Limits   Carrying(hrs):   Weight Limit(lb):   Lifting(hrs):   Weight  Limit(lb):     Comments: It is unknown whether his disability today, the polyarthralgia and increased inflammatory markers, are related to a work injury or not.  It seems given the multiple areas it is multifactorial and difficult to blame this completely on a work-related injury.  I am unable to answer several of the questions above given the lack of definitive diagnosis.  Patient is hospitalized as he is currently unable to walk secondary to pain for further evaluation and work-up    Repetitive Actions   Hands: i.e. Fine Manipulations from Grasping:     Feet: i.e. Operating Foot Controls:     Driving / Operate Machinery:     Physician Name: Killian Lucas Physician Signature: isidoro-KILLIAN Zaragoza M.D. e-Signature:  , Medical Director   Clinic Name / Location: [facility], EMERGENCY DEPT  [address] [place] [address]  [phone]     Clinic Phone Number: Dept: [phone]   Appointment Time:  Visit Start Time:    Check-In Time:  12:32 PM Visit Discharge Time:    Original-Treating Physician or Chiropractor    Page 2-Insurer/TPA    Page 3-Employer    Page 4-Employee

## 2020-10-01 NOTE — ED TRIAGE NOTES
Chief Complaint   Patient presents with   • Head Pain     Pt reports to have arthritis and is having increased pain all over, chronic since fall a couple years ago.    • Neck Pain   • Knee Pain   • Joint Pain   • Back Pain   • Abdominal Swelling     Pt/staff masked and in appropriate PPE during encounter.

## 2020-10-02 LAB
ANION GAP SERPL CALC-SCNC: 12 MMOL/L (ref 7–16)
BASOPHILS # BLD AUTO: 0.7 % (ref 0–1.8)
BASOPHILS # BLD: 0.06 K/UL (ref 0–0.12)
BUN SERPL-MCNC: 14 MG/DL (ref 8–22)
CALCIUM SERPL-MCNC: 9 MG/DL (ref 8.5–10.5)
CHLORIDE SERPL-SCNC: 104 MMOL/L (ref 96–112)
CO2 SERPL-SCNC: 20 MMOL/L (ref 20–33)
CREAT SERPL-MCNC: 0.8 MG/DL (ref 0.5–1.4)
EOSINOPHIL # BLD AUTO: 0.27 K/UL (ref 0–0.51)
EOSINOPHIL NFR BLD: 3.4 % (ref 0–6.9)
ERYTHROCYTE [DISTWIDTH] IN BLOOD BY AUTOMATED COUNT: 41.7 FL (ref 35.9–50)
GLUCOSE SERPL-MCNC: 125 MG/DL (ref 65–99)
HCT VFR BLD AUTO: 37.1 % (ref 42–52)
HGB BLD-MCNC: 12.3 G/DL (ref 14–18)
IMM GRANULOCYTES # BLD AUTO: 0.04 K/UL (ref 0–0.11)
IMM GRANULOCYTES NFR BLD AUTO: 0.5 % (ref 0–0.9)
LYMPHOCYTES # BLD AUTO: 2.16 K/UL (ref 1–4.8)
LYMPHOCYTES NFR BLD: 26.9 % (ref 22–41)
MCH RBC QN AUTO: 28.3 PG (ref 27–33)
MCHC RBC AUTO-ENTMCNC: 33.2 G/DL (ref 33.7–35.3)
MCV RBC AUTO: 85.3 FL (ref 81.4–97.8)
MONOCYTES # BLD AUTO: 0.63 K/UL (ref 0–0.85)
MONOCYTES NFR BLD AUTO: 7.8 % (ref 0–13.4)
NEUTROPHILS # BLD AUTO: 4.87 K/UL (ref 1.82–7.42)
NEUTROPHILS NFR BLD: 60.7 % (ref 44–72)
NRBC # BLD AUTO: 0 K/UL
NRBC BLD-RTO: 0 /100 WBC
PLATELET # BLD AUTO: 285 K/UL (ref 164–446)
PMV BLD AUTO: 9.6 FL (ref 9–12.9)
POTASSIUM SERPL-SCNC: 3.8 MMOL/L (ref 3.6–5.5)
RBC # BLD AUTO: 4.35 M/UL (ref 4.7–6.1)
SARS-COV-2 RNA RESP QL NAA+PROBE: NOTDETECTED
SODIUM SERPL-SCNC: 136 MMOL/L (ref 135–145)
SPECIMEN SOURCE: NORMAL
URATE SERPL-MCNC: 9.4 MG/DL (ref 2.5–8.3)
WBC # BLD AUTO: 8 K/UL (ref 4.8–10.8)

## 2020-10-02 PROCEDURE — 96375 TX/PRO/DX INJ NEW DRUG ADDON: CPT

## 2020-10-02 PROCEDURE — 80048 BASIC METABOLIC PNL TOTAL CA: CPT

## 2020-10-02 PROCEDURE — 99225 PR SUBSEQUENT OBSERVATION CARE,LEVEL II: CPT | Performed by: INTERNAL MEDICINE

## 2020-10-02 PROCEDURE — 73130 X-RAY EXAM OF HAND: CPT | Mod: LT

## 2020-10-02 PROCEDURE — 73130 X-RAY EXAM OF HAND: CPT | Mod: RT

## 2020-10-02 PROCEDURE — 96376 TX/PRO/DX INJ SAME DRUG ADON: CPT

## 2020-10-02 PROCEDURE — 700102 HCHG RX REV CODE 250 W/ 637 OVERRIDE(OP): Performed by: HOSPITALIST

## 2020-10-02 PROCEDURE — 85025 COMPLETE CBC W/AUTO DIFF WBC: CPT

## 2020-10-02 PROCEDURE — A9270 NON-COVERED ITEM OR SERVICE: HCPCS | Performed by: INTERNAL MEDICINE

## 2020-10-02 PROCEDURE — 700102 HCHG RX REV CODE 250 W/ 637 OVERRIDE(OP): Performed by: INTERNAL MEDICINE

## 2020-10-02 PROCEDURE — A9270 NON-COVERED ITEM OR SERVICE: HCPCS | Performed by: HOSPITALIST

## 2020-10-02 PROCEDURE — 96372 THER/PROPH/DIAG INJ SC/IM: CPT

## 2020-10-02 PROCEDURE — 700111 HCHG RX REV CODE 636 W/ 250 OVERRIDE (IP): Performed by: INTERNAL MEDICINE

## 2020-10-02 PROCEDURE — 84550 ASSAY OF BLOOD/URIC ACID: CPT

## 2020-10-02 PROCEDURE — G0378 HOSPITAL OBSERVATION PER HR: HCPCS

## 2020-10-02 PROCEDURE — 97165 OT EVAL LOW COMPLEX 30 MIN: CPT

## 2020-10-02 PROCEDURE — 700111 HCHG RX REV CODE 636 W/ 250 OVERRIDE (IP): Performed by: HOSPITALIST

## 2020-10-02 RX ORDER — COLCHICINE 0.6 MG/1
0.6 TABLET ORAL DAILY
Status: DISCONTINUED | OUTPATIENT
Start: 2020-10-02 | End: 2020-10-03 | Stop reason: HOSPADM

## 2020-10-02 RX ORDER — METHYLPREDNISOLONE SODIUM SUCCINATE 40 MG/ML
40 INJECTION, POWDER, LYOPHILIZED, FOR SOLUTION INTRAMUSCULAR; INTRAVENOUS DAILY
Status: DISCONTINUED | OUTPATIENT
Start: 2020-10-02 | End: 2020-10-03 | Stop reason: HOSPADM

## 2020-10-02 RX ADMIN — RIFAMPIN 300 MG: 300 CAPSULE ORAL at 20:27

## 2020-10-02 RX ADMIN — METHYLPREDNISOLONE SODIUM SUCCINATE 40 MG: 40 INJECTION, POWDER, FOR SOLUTION INTRAMUSCULAR; INTRAVENOUS at 14:08

## 2020-10-02 RX ADMIN — OMEPRAZOLE 20 MG: 20 CAPSULE, DELAYED RELEASE ORAL at 04:59

## 2020-10-02 RX ADMIN — KETOROLAC TROMETHAMINE 30 MG: 30 INJECTION, SOLUTION INTRAMUSCULAR; INTRAVENOUS at 09:38

## 2020-10-02 RX ADMIN — OXYCODONE 5 MG: 5 TABLET ORAL at 04:59

## 2020-10-02 RX ADMIN — OXYCODONE 5 MG: 5 TABLET ORAL at 14:11

## 2020-10-02 RX ADMIN — KETOROLAC TROMETHAMINE 30 MG: 30 INJECTION, SOLUTION INTRAMUSCULAR; INTRAVENOUS at 20:27

## 2020-10-02 RX ADMIN — ENOXAPARIN SODIUM 40 MG: 40 INJECTION SUBCUTANEOUS at 04:59

## 2020-10-02 RX ADMIN — COLCHICINE 0.6 MG: 0.6 TABLET ORAL at 11:07

## 2020-10-02 RX ADMIN — RIFAMPIN 300 MG: 300 CAPSULE ORAL at 11:06

## 2020-10-02 ASSESSMENT — ENCOUNTER SYMPTOMS
VOMITING: 0
FALLS: 0
RESPIRATORY NEGATIVE: 1
BACK PAIN: 1
PSYCHIATRIC NEGATIVE: 1
NERVOUS/ANXIOUS: 0
HEADACHES: 0
ABDOMINAL PAIN: 1
DIARRHEA: 0
SORE THROAT: 0
PALPITATIONS: 0
NAUSEA: 0
DEPRESSION: 0
NECK PAIN: 1
EYES NEGATIVE: 1
SHORTNESS OF BREATH: 0
MYALGIAS: 1
CONSTIPATION: 0
COUGH: 0
FEVER: 0
DIZZINESS: 0
CHILLS: 0
BLURRED VISION: 0
WEAKNESS: 1

## 2020-10-02 ASSESSMENT — COGNITIVE AND FUNCTIONAL STATUS - GENERAL
SUGGESTED CMS G CODE MODIFIER DAILY ACTIVITY: CH
DAILY ACTIVITIY SCORE: 24

## 2020-10-02 ASSESSMENT — PAIN DESCRIPTION - PAIN TYPE: TYPE: ACUTE PAIN

## 2020-10-02 ASSESSMENT — ACTIVITIES OF DAILY LIVING (ADL): TOILETING: INDEPENDENT

## 2020-10-02 ASSESSMENT — PAIN SCALES - WONG BAKER: WONGBAKER_NUMERICALRESPONSE: HURTS JUST A LITTLE BIT

## 2020-10-02 NOTE — ED NOTES
Pt has been re-medicated for pain.  He indicated he still has a lot of pain in his back and in his abdomen.

## 2020-10-02 NOTE — H&P
Hospital Medicine History & Physical Note    Date of Service  10/1/2020    Primary Care Physician  Pcp Pt States None    Consultants      Code Status  Full Code    Chief Complaint  Chief Complaint   Patient presents with   • Head Pain     Pt reports to have arthritis and is having increased pain all over, chronic since fall a couple years ago.    • Neck Pain   • Knee Pain   • Joint Pain   • Back Pain   • Abdominal Swelling       History of Presenting Illness  59 y.o. male who presented 10/1/2020 with generalized arthralgias of few months duration.  He was hospitalized in July for evaluation of generalized arthralgias there was concern for inflammatory arthritis and possible brucellosis he had an extensive work-up was evaluated by infectious disease he had knee joint aspiration which revealed urate crystals culture was negative.  He also had positive interferon and was started on rifampin.  He received a course of steroids and discharged with plan to establish primary care and be evaluated by rheumatology.  Patient has no medical coverage he did not establish with PCP he was seen at the urgent care 20 days ago received a tapering dose of prednisone.  He presented today for persistent pain the pain is moderate to severe it is in his neck back and joints knees and shoulders.  Reports joint stiffness.  No fever or chills.  He complains of epigastric pain mild worse since he received steroids no nausea no vomiting no melena rectal bleeding.      Review of Systems  Review of Systems   All other systems reviewed and are negative.      Past Medical History   has a past medical history of Arthritis.    Surgical History   has a past surgical history that includes eye surgery.     Family History  Reviewed and not pertinent to the presenting problem    Social History   reports that he has been smoking cigarettes. He has a 2.50 pack-year smoking history. He has never used smokeless tobacco. He reports current alcohol use. He  reports previous drug use.    Allergies  No Known Allergies    Medications  Prior to Admission Medications   Prescriptions Last Dose Informant Patient Reported? Taking?   acetaminophen (TYLENOL) 325 MG Tab 10/1/2020 at AM Friend Yes Yes   Sig: Take 650 mg by mouth every four hours as needed for Moderate Pain.   riFAMPin (RIFADINE) 300 MG Cap 10/1/2020 at AM Friend Yes Yes   Sig: Take 300 mg by mouth 2 times a day.      Facility-Administered Medications: None       Physical Exam  Temp:  [36.7 °C (98.1 °F)] 36.7 °C (98.1 °F)  Pulse:  [64-87] 87  Resp:  [16] 16  BP: (132-159)/(77-95) 154/90  SpO2:  [95 %-97 %] 96 %    Physical Exam  Vitals signs and nursing note reviewed.   Constitutional:       Appearance: He is well-developed. He is not diaphoretic.   HENT:      Head: Normocephalic and atraumatic.      Mouth/Throat:      Pharynx: No oropharyngeal exudate.   Eyes:      General: No scleral icterus.        Right eye: No discharge.         Left eye: No discharge.      Conjunctiva/sclera: Conjunctivae normal.      Pupils: Pupils are equal, round, and reactive to light.   Neck:      Musculoskeletal: Neck supple.      Vascular: No JVD.      Trachea: No tracheal deviation.   Cardiovascular:      Rate and Rhythm: Normal rate and regular rhythm.      Heart sounds: No murmur. No friction rub. No gallop.    Pulmonary:      Effort: Pulmonary effort is normal. No respiratory distress.      Breath sounds: Normal breath sounds. No stridor. No wheezing.   Chest:      Chest wall: No tenderness.   Abdominal:      General: Bowel sounds are normal. There is no distension.      Palpations: Abdomen is soft.      Tenderness: There is no abdominal tenderness. There is no rebound.   Musculoskeletal:         General: Swelling and tenderness present.   Skin:     General: Skin is warm and dry.      Nails: There is no clubbing.     Neurological:      Mental Status: He is alert and oriented to person, place, and time.      Cranial Nerves: No  cranial nerve deficit.      Coordination: Coordination normal.   Psychiatric:         Behavior: Behavior normal.         Laboratory:  Recent Labs     10/01/20  1448   WBC 8.7   RBC 4.48*   HEMOGLOBIN 13.0*   HEMATOCRIT 37.6*   MCV 83.9   MCH 29.0   MCHC 34.6   RDW 41.2   PLATELETCT 329   MPV 10.1     Recent Labs     10/01/20  1448   SODIUM 139   POTASSIUM 3.6   CHLORIDE 103   CO2 21   GLUCOSE 110*   BUN 10   CREATININE 0.71   CALCIUM 9.5     Recent Labs     10/01/20  1448   ALTSGPT 14   ASTSGOT 13   ALKPHOSPHAT 143*   TBILIRUBIN 0.3   GLUCOSE 110*         No results for input(s): NTPROBNP in the last 72 hours.      No results for input(s): TROPONINT in the last 72 hours.    Imaging:  CT-TSPINE W/O PLUS RECONS   Final Result      Minimal thoracic spondylosis. No acute fracture or listhesis in the thoracic spine.      CT-LSPINE W/O PLUS RECONS   Final Result      1.  There is no acute fracture or malalignment of the lumbar spine.   2.  There is mild degenerative disc disease with endplate spurring and disc bulges at the L3-4 and L4-5 levels with mild canal stenosis or neural foraminal narrowing predominantly at the L3-4 level.      DX-HAND 3+ RIGHT    (Results Pending)   DX-HAND 3+ LEFT    (Results Pending)         Assessment/Plan:  I anticipate this patient is appropriate for observation status at this time.    * Polyarthralgia with peripheral swelling- (present on admission)  Assessment & Plan  Patient has extensive work-up during his recent hospitalization reviewed serologies and cultures and fluid analysis  Fluid cultures were negative he had urate crystals on his fluid analysis from his knee arthrocentesis  We will check uric acid and consider starting him on allopurinol  His rheumatoid factor was negative we will check ARTI and anti CCP  Check x-ray of hands  We will start him on IV Toradol and pain management  Patient will need to be evaluated by rheumatology. 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

## 2020-10-02 NOTE — NON-PROVIDER
Daily Progress Note    Date of Service  10/2/2020    Chief Complaint  59 y.o. male admitted 10/1/2020 with back, neck, and knee pain.    Hospital Course    Mr. Carrillo presented to the emergency department of 10/1/20 with complaints of severe back pain and generalized arthralgias. Patient was seen in July with similar complaints and received full work-up for inflammatory arthritis and was evaluated by infectious disease. He was positive for interferon and started on rifampin. He received a course of steroids and was discharged home to follow-up with PCP and rheumatology. Patient was unable to do this as he has no medical insurance. Patient now has complaints of severe neck and back pain, left knee pain, right shoulder pain, and bilateral hand pain and swelling. He also complains of abdominal pain and swelling. Patient admitted for further evaluation.       Interval Problem Update  -Patient seen and examined. Labs and vitals reviewed.  -Patient states extreme pain in the back and neck even with very light palpation. Patient also complains of abdominal pain and swelling, no nausea or vomiting. Patient also states left knee and right shoulder pain and swelling and pain in both hands. Patient states no radiation of pain or sensation changes. Patient does have complaints of generalized weakness.     A qualified  was used to interpret Liberian during this encounter.  ’s name/ID number was [id number]  and mode of interpretation was iPad. The content of the interpretation included History/Visit information, Patient Education    Consultants/Specialty  None    Code Status  Full Code    Disposition  TBD    Review of Systems  Review of Systems   Constitutional: Positive for malaise/fatigue.   HENT: Negative.    Eyes: Negative.    Respiratory: Negative.    Cardiovascular: Positive for leg swelling.   Gastrointestinal: Positive for abdominal pain.   Genitourinary: Negative.    Musculoskeletal:  Positive for back pain, joint pain and neck pain.   Skin: Negative.    Neurological: Positive for weakness.   Endo/Heme/Allergies: Negative.    Psychiatric/Behavioral: Negative.         Physical Exam  Temp:  [36.5 °C (97.7 °F)-36.8 °C (98.2 °F)] 36.8 °C (98.2 °F)  Pulse:  [64-87] 66  Resp:  [15-16] 16  BP: (121-159)/(68-95) 127/84  SpO2:  [95 %-97 %] 96 %    Physical Exam  Constitutional:       Appearance: Normal appearance.   Eyes:      Extraocular Movements: Extraocular movements intact.      Pupils: Pupils are equal, round, and reactive to light.   Neck:      Musculoskeletal: Normal range of motion. Muscular tenderness present.   Cardiovascular:      Rate and Rhythm: Normal rate and regular rhythm.      Pulses: Normal pulses.      Heart sounds: Normal heart sounds.   Pulmonary:      Effort: Pulmonary effort is normal.      Breath sounds: Normal breath sounds.   Abdominal:      General: There is distension.      Tenderness: There is abdominal tenderness.   Musculoskeletal:         General: Swelling and tenderness present.      Comments: Pain in neck and back, left knee, right shoulder, and hands.    Skin:     General: Skin is warm.   Neurological:      Mental Status: He is alert.      Motor: Weakness present.   Psychiatric:         Mood and Affect: Mood normal.         Behavior: Behavior normal.         Fluids    Intake/Output Summary (Last 24 hours) at 10/2/2020 1032  Last data filed at 10/2/2020 0426  Gross per 24 hour   Intake --   Output 400 ml   Net -400 ml       Laboratory  Recent Labs     10/01/20  1448 10/02/20  0500   WBC 8.7 8.0   RBC 4.48* 4.35*   HEMOGLOBIN 13.0* 12.3*   HEMATOCRIT 37.6* 37.1*   MCV 83.9 85.3   MCH 29.0 28.3   MCHC 34.6 33.2*   RDW 41.2 41.7   PLATELETCT 329 285   MPV 10.1 9.6     Recent Labs     10/01/20  1448 10/02/20  0500   SODIUM 139 136   POTASSIUM 3.6 3.8   CHLORIDE 103 104   CO2 21 20   GLUCOSE 110* 125*   BUN 10 14   CREATININE 0.71 0.80   CALCIUM 9.5 9.0                    Imaging  DX-HAND 3+ LEFT   Final Result         1.  Persistent widening of the scapholunate interval, appearance suggests chronic ligamentous injury   2.  Degenerative changes of the interphalangeal joints, greater involving the proximal interphalangeal joints.      DX-HAND 3+ RIGHT   Final Result         1.  Widening of the scapholunate interval, appearance favors ligamentous injury.   2.  Degenerative changes of the metacarpal and interphalangeal joints with areas of bony erosion, consider erosive arthropathy      CT-TSPINE W/O PLUS RECONS   Final Result      Minimal thoracic spondylosis. No acute fracture or listhesis in the thoracic spine.      CT-LSPINE W/O PLUS RECONS   Final Result      1.  There is no acute fracture or malalignment of the lumbar spine.   2.  There is mild degenerative disc disease with endplate spurring and disc bulges at the L3-4 and L4-5 levels with mild canal stenosis or neural foraminal narrowing predominantly at the L3-4 level.             Talisha Enciso, RN, BSN, AGACNP Student

## 2020-10-02 NOTE — PROGRESS NOTES
Assumed pt care at 0700. Received report from Charo RHOADES. A&O x4, Irish speaking only, translation services provided by certified  Kevin. Pt c/o 10/10 pain after working with OT, medicated per MAR. Respirations even and unlabored on RA.    Updated on POC, communication board updated. Bed locked and in lowest position. Call light and belongings within reach. Non-skid socks in place. Needs met, will continue to monitor.

## 2020-10-02 NOTE — ED NOTES
Report to Nancy RHOADES.  Spoke to pt's advocate-Elly Grewal.  [phone].  He states that the patient has been having a very difficult time getting around at home.  Joints are swollen and painful.  He is happy that we will admit him.

## 2020-10-02 NOTE — THERAPY
Occupational Therapy   Initial Evaluation     Patient Name: Fede Carrillo  Age:  59 y.o., Sex:  male  Medical Record #: [id number]  Today's Date: 10/2/2020     Precautions  Precautions: Other (See Comments)  Comments: severe global joint pain    Assessment  Patient is 59 y.o. male with a diagnosis of polyarthritis and severe pain with movement. Pt also limited by decreased ROM L index finger leading to difficulty performing ADLs. Pt lacks social support and needs to be independent to be at home. HEP given to address decreased ROM.       Plan    Recommend Occupational Therapy 2 times per week for 2 visits for the following treatments:  Self Care/Activities of Daily Living and Therapeutic Activities.    DC Equipment Recommendations: Tub Transfer Bench  Discharge Recommendations: Recommend outpatient occupational therapy services to address higher level deficits(for hand therapy)        10/02/20 0905   Prior Living Situation   Prior Services Home-Independent   Housing / Facility 2 Story Apartment / Condo   Steps Into Home 10   Steps In Home 0   Bathroom Set up Bathtub / Shower Combination   Equipment Owned None   Lives with - Patient's Self Care Capacity Unrelated Adult   Comments No social support available.   Prior Level of ADL Function   Self Feeding Independent   Grooming / Hygiene Independent   Bathing Independent   Dressing Independent   Toileting Independent   Comments All self care is painful and difficult for him due to pain   Prior Level of IADL Function   Medication Management Independent   Laundry Independent   Kitchen Mobility Independent   Finances Independent   Home Management Independent   Shopping Independent   Prior Level Of Mobility Independent Without Device in Community   Driving / Transportation Driving Independent   Comments Pt drives short distances; friend drives pt longer distances   History of Falls   History of Falls No   Precautions   Precautions Other (See Comments)    Comments severe global joint pain   Cognition    Cognition / Consciousness WDL   Passive ROM Upper Body   Passive ROM Upper Body X   Comments L index finger at dip and pip decreased PROM   Active ROM Upper Body   Active ROM Upper Body  X   Dominant Hand Right   Sensation Upper Body   Upper Extremity Sensation  X   Comments Pt reports decreased sensation in left index finger   Coordination Upper Body   Coordination X   Fine Motor Coordination Impaired due to lack of use of left hand   Balance Assessment   Sitting Balance (Static) Good   Sitting Balance (Dynamic) Fair +   Standing Balance (Static) Fair +   Standing Balance (Dynamic) Fair   Weight Shift Sitting Fair   Weight Shift Standing Fair   Comments Limited by pain   Bed Mobility    Supine to Sit Minimal Assist   Scooting Supervised   Comments Pain limiting function; pt asking for assist up to edge; probably could have done without assist   ADL Assessment   Eating Independent   Grooming Supervision;Standing   Lower Body Dressing Supervision   Toileting Supervision   Functional Mobility   Sit to Stand Supervised   Bed, Chair, Wheelchair Transfer Supervised   Toilet Transfers Supervised   Transfer Method Stand Pivot   Comments No AD used; HHA available   Activity Tolerance   Sitting in Chair > 10 min  (left up in chair)   Sitting Edge of Bed 10 min   Standing 10 min total   Patient / Family Goals   Patient / Family Goal #1 To be able to use left hand   Short Term Goals   Short Term Goal # 1 Independent with AAROM L hand.   Short Term Goal # 2 Independent with standing grooming /hygiene including all tasks requiring bilateral hand coordination, standing at sink x 10 minutes without LOB.   Anticipated Discharge Equipment and Recommendations   DC Equipment Recommendations Tub Transfer Bench   Discharge Recommendations Recommend outpatient occupational therapy services to address higher level deficits  (for hand therapy)

## 2020-10-02 NOTE — ASSESSMENT & PLAN NOTE
He has been maintained on rifampin he will need to follow-up with the health department after discharge

## 2020-10-02 NOTE — ASSESSMENT & PLAN NOTE
Patient has extensive work-up during his recent hospitalization reviewed serologies and cultures and fluid analysis  Fluid cultures were negative he had urate crystals on his fluid analysis from his knee arthrocentesis  Rheumatoid factor, ARTI, anti-CCP were negative during admission in July.  We will start him on IV Toradol and pain management  Patient will need to be evaluated by rheumatology. to assist with outpatient referrals since he has no medical coverage  PT/OT recommend outpatient physical therapy    Reviewed CT of thoracic, lumbar wrist, x-ray of hands.  Started on colchicine for gout.  Continue to monitor uric acid.

## 2020-10-02 NOTE — ED NOTES
Med rec complete via interview with pt friend via phone. Pt is [place] speaking only and couldn't participate in interview.  Allergies reviewed. Pt friend denies antibiotic use in last 14 days.

## 2020-10-02 NOTE — PROGRESS NOTES
Hospital Medicine Daily Progress Note    Date of Service  10/2/2020    Chief Complaint  59 y.o. male admitted 10/1/2020 with generalized arthralgias    Hospital Course    Mr. Fede Carrillo is a 59 y.o. male who presented on 10/1/2020 with generalized arthralgias for months.  He was recently hospitalized in July with generalized arthralgias with concern for inflammatory arthritis and possible brucellosis.  Extensive work-up included knee aspiration which revealed urate crystals and culture negative.  He also had positive interferon and started on rifampin.  Previous rheumatoid factor, CCP, ARTI unremarkable.  Patient did not follow-up to establish primary care provider.  Presented to urgent care 20 days ago and received another tapering dose of prednisone.  He reports mild epigastric pain worse since he received steroids.  Denies nausea, vomiting, melena.  Patient did have elevated ESR, CRP, uric acid on presentation.  CT lumbar and thoracic spine showed degenerative disc disease with mild canal stenosis at L3-4.  Plain films of hand showed degenerative changes, chronic ligamentous injury.        Interval Problem Update  Patient was seen and examined at bedside.  I have personally reviewed vitals, labs, and imaging.    10/2.  Afebrile.  Has been hypertensive.  On room air.  Denies fever, chills, shortness of breath.  Patient does report chest pain, abdominal pain, generalized body aches and joint pain.  Tender to touch everywhere.  Did have elevated ESR, CRP, uric acid.  Started on IV steroids and colchicine.  Concern for polyarthralgias versus vasculitis.  PT/OT recommended outpatient physical therapy but patient can hardly move.  Was not able to follow-up with rheumatologist as recommended during last admission.  Limited immigrant agricultural worker insurance.   was used.  Counseled about plan of care.    Consultants/Specialty  None    Code Status  Full  Code    Disposition  Outpatient physical therapy  Transfer to general medical floor    Review of Systems  Review of Systems   Constitutional: Negative for chills and fever.   HENT: Negative for congestion and sore throat.    Eyes: Negative for blurred vision.   Respiratory: Negative for cough and shortness of breath.    Cardiovascular: Positive for chest pain. Negative for palpitations and leg swelling.   Gastrointestinal: Positive for abdominal pain. Negative for constipation, diarrhea, nausea and vomiting.   Genitourinary: Negative for dysuria, frequency and urgency.   Musculoskeletal: Positive for back pain, joint pain and myalgias. Negative for falls.   Skin: Negative for rash.   Neurological: Positive for weakness. Negative for dizziness and headaches.   Psychiatric/Behavioral: Negative for depression. The patient is not nervous/anxious.    All other systems reviewed and are negative.       Physical Exam  Temp:  [36.5 °C (97.7 °F)-36.8 °C (98.2 °F)] 36.8 °C (98.2 °F)  Pulse:  [64-87] 66  Resp:  [15-16] 16  BP: (121-159)/(68-95) 127/84  SpO2:  [95 %-97 %] 96 %    Physical Exam  Vitals signs and nursing note reviewed.   Constitutional:       General: He is not in acute distress.     Appearance: Normal appearance.   HENT:      Head: Normocephalic and atraumatic.      Nose: Nose normal.      Mouth/Throat:      Mouth: Mucous membranes are moist.      Pharynx: Oropharynx is clear.   Eyes:      Extraocular Movements: Extraocular movements intact.      Conjunctiva/sclera: Conjunctivae normal.   Neck:      Musculoskeletal: Normal range of motion and neck supple.   Cardiovascular:      Rate and Rhythm: Normal rate and regular rhythm.      Pulses: Normal pulses.      Heart sounds: Normal heart sounds. No murmur. No friction rub. No gallop.    Pulmonary:      Effort: Pulmonary effort is normal. No respiratory distress.      Breath sounds: Normal breath sounds. No wheezing or rales.   Chest:      Chest wall: Tenderness  present.   Abdominal:      General: Abdomen is flat. Bowel sounds are normal. There is distension.      Palpations: Abdomen is soft. There is no mass.      Tenderness: There is abdominal tenderness. There is no guarding.   Musculoskeletal: Normal range of motion.   Skin:     General: Skin is warm.      Capillary Refill: Capillary refill takes less than 2 seconds.   Neurological:      General: No focal deficit present.      Mental Status: He is alert and oriented to person, place, and time. Mental status is at baseline.      Cranial Nerves: No cranial nerve deficit.      Motor: No weakness.   Psychiatric:         Mood and Affect: Mood normal.         Behavior: Behavior normal.         Fluids    Intake/Output Summary (Last 24 hours) at 10/2/2020 0938  Last data filed at 10/2/2020 0426  Gross per 24 hour   Intake --   Output 400 ml   Net -400 ml       Laboratory  Recent Labs     10/01/20  1448 10/02/20  0500   WBC 8.7 8.0   RBC 4.48* 4.35*   HEMOGLOBIN 13.0* 12.3*   HEMATOCRIT 37.6* 37.1*   MCV 83.9 85.3   MCH 29.0 28.3   MCHC 34.6 33.2*   RDW 41.2 41.7   PLATELETCT 329 285   MPV 10.1 9.6     Recent Labs     10/01/20  1448 10/02/20  0500   SODIUM 139 136   POTASSIUM 3.6 3.8   CHLORIDE 103 104   CO2 21 20   GLUCOSE 110* 125*   BUN 10 14   CREATININE 0.71 0.80   CALCIUM 9.5 9.0                   Imaging  DX-HAND 3+ LEFT   Final Result         1.  Persistent widening of the scapholunate interval, appearance suggests chronic ligamentous injury   2.  Degenerative changes of the interphalangeal joints, greater involving the proximal interphalangeal joints.      DX-HAND 3+ RIGHT   Final Result         1.  Widening of the scapholunate interval, appearance favors ligamentous injury.   2.  Degenerative changes of the metacarpal and interphalangeal joints with areas of bony erosion, consider erosive arthropathy      CT-TSPINE W/O PLUS RECONS   Final Result      Minimal thoracic spondylosis. No acute fracture or listhesis in the  thoracic spine.      CT-LSPINE W/O PLUS RECONS   Final Result      1.  There is no acute fracture or malalignment of the lumbar spine.   2.  There is mild degenerative disc disease with endplate spurring and disc bulges at the L3-4 and L4-5 levels with mild canal stenosis or neural foraminal narrowing predominantly at the L3-4 level.           Assessment/Plan  * Polyarthralgia with peripheral swelling- (present on admission)  Assessment & Plan  Patient has extensive work-up during his recent hospitalization reviewed serologies and cultures and fluid analysis  Fluid cultures were negative he had urate crystals on his fluid analysis from his knee arthrocentesis  Rheumatoid factor, ARTI, anti-CCP were negative during admission in July.  We will start him on IV Toradol and pain management  Patient will need to be evaluated by rheumatology. to assist with outpatient referrals since he has no medical coverage  PT/OT recommend outpatient physical therapy    Reviewed CT of thoracic, lumbar wrist, x-ray of hands.  Started on colchicine for gout.  Continue to monitor uric acid.    Dyspepsia  Assessment & Plan  Likely related to steroids  We will start him on GI prophylaxis with Prilosec    Gout  Assessment & Plan  Elevated uric acid.  Started on colchicine.    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

## 2020-10-02 NOTE — ED PROVIDER NOTES
"ED Provider Note    CHIEF COMPLAINT  Chief Complaint   Patient presents with   • Head Pain     Pt reports to have arthritis and is having increased pain all over, chronic since fall a couple years ago.    • Neck Pain   • Knee Pain   • Joint Pain   • Back Pain   • Abdominal Swelling       HPI  Fede Carrillo is a 59 y.o. male who presents with multiple areas of pain, described neck pain, left shoulder pain, left knee pain, low back pain.  He states he has had a bad back for the last 4 years since a work-related injury.  He states 2 months ago while at work felt a twinge of pain in the lower back and has been more painful since.  Through , I was unable to ascertain a history of new injury to his back at work.  Symptoms today are listed as a workers comp related.  Per a friend who brought him to the hospital, patient is been unable to move around his own house secondary to severe pain in his joints.  He is currently taking prednisone taper according to the chart and according to the patient, last dose this morning.  This has not helped him he states.  No fever, no cough, no vomiting or diarrhea.  Patient brought back by wheelchair, with much difficulty getting him from the wheelchair to the bed secondary to his pain.  Patient states the , \"nobody is ever x-rayed my back\".    REVIEW OF SYSTEMS    Constitutional: No fever  Respiratory: No shortness of breath  Cardiac: No chest pain or syncope  Gastrointestinal: No abdominal pain  Musculoskeletal: Multiple areas of pain, arthralgia  Neurologic: Denies headaches myself  Skin: no shingles           PAST MEDICAL HISTORY  Past Medical History:   Diagnosis Date   • Arthritis        FAMILY HISTORY  No family history on file.    SOCIAL HISTORY  Social History     Socioeconomic History   • Marital status: Single     Spouse name: Not on file   • Number of children: Not on file   • Years of education: Not on file   • Highest education " "level: Not on file   Occupational History   • Not on file   Social Needs   • Financial resource strain: Not hard at all   • Food insecurity     Worry: Never true     Inability: Never true   • Transportation needs     Medical: No     Non-medical: No   Tobacco Use   • Smoking status: Current Every Day Smoker     Packs/day: 0.10     Years: 25.00     Pack years: 2.50     Types: Cigarettes   • Smokeless tobacco: Never Used   Substance and Sexual Activity   • Alcohol use: Yes     Frequency: 4 or more times a week     Drinks per session: 1 or 2     Comment: 2-3 beers daily   • Drug use: Not Currently   • Sexual activity: Not on file   Lifestyle   • Physical activity     Days per week: 6 days     Minutes per session: Not on file   • Stress: Only a little   Relationships   • Social connections     Talks on phone: Once a week     Gets together: Once a week     Attends Evangelical service: Never     Active member of club or organization: No     Attends meetings of clubs or organizations: Never     Relationship status: Never    • Intimate partner violence     Fear of current or ex partner: Not on file     Emotionally abused: Not on file     Physically abused: Not on file     Forced sexual activity: Not on file   Other Topics Concern   • Not on file   Social History Narrative   • Not on file       SURGICAL HISTORY  Past Surgical History:   Procedure Laterality Date   • EYE SURGERY         CURRENT MEDICATIONS  Home Medications    **Home medications have not yet been reviewed for this encounter**         ALLERGIES  No Known Allergies    PHYSICAL EXAM  VITAL SIGNS: /77   Pulse 65   Temp 36.7 °C (98.1 °F) (Temporal)   Resp 16   Ht 1.753 m (5' 9\")   Wt 81.6 kg (180 lb)   SpO2 96%   BMI 26.58 kg/m²   Constitutional: No acute distress, patient appears uncomfortable  Cardiovascular: Normal heart rate, Normal rhythm  Pulmonary: Normal breath sounds, No respiratory distress, No wheezing  Abdomen: Soft, No tenderness,  No " pulsatile masses.   Skin: Warm, No shingles.  No cellulitis.  Mild knee and shoulder swelling  Musculoskeletal: Tenderness of diffuse to his spine.  Tenderness both shoulders, left hip, lumbar spine, thoracic spine, left knee.  Vascular: Pulses present all 4 extremities  Neurologic: Sensation intact.  Strength somewhat diminished secondary to pain greater in the left side than the right.  He is able to move all extremities, no facial droop.    RADIOLOGY/PROCEDURES  CT-TSPINE W/O PLUS RECONS   Final Result      Minimal thoracic spondylosis. No acute fracture or listhesis in the thoracic spine.      CT-LSPINE W/O PLUS RECONS   Final Result      1.  There is no acute fracture or malalignment of the lumbar spine.   2.  There is mild degenerative disc disease with endplate spurring and disc bulges at the L3-4 and L4-5 levels with mild canal stenosis or neural foraminal narrowing predominantly at the L3-4 level.        Results for orders placed or performed during the hospital encounter of 10/01/20   CBC WITH DIFFERENTIAL   Result Value Ref Range    WBC 8.7 4.8 - 10.8 K/uL    RBC 4.48 (L) 4.70 - 6.10 M/uL    Hemoglobin 13.0 (L) 14.0 - 18.0 g/dL    Hematocrit 37.6 (L) 42.0 - 52.0 %    MCV 83.9 81.4 - 97.8 fL    MCH 29.0 27.0 - 33.0 pg    MCHC 34.6 33.7 - 35.3 g/dL    RDW 41.2 35.9 - 50.0 fL    Platelet Count 329 164 - 446 K/uL    MPV 10.1 9.0 - 12.9 fL    Neutrophils-Polys 60.30 44.00 - 72.00 %    Lymphocytes 28.20 22.00 - 41.00 %    Monocytes 7.50 0.00 - 13.40 %    Eosinophils 2.60 0.00 - 6.90 %    Basophils 0.90 0.00 - 1.80 %    Immature Granulocytes 0.50 0.00 - 0.90 %    Nucleated RBC 0.00 /100 WBC    Neutrophils (Absolute) 5.24 1.82 - 7.42 K/uL    Lymphs (Absolute) 2.45 1.00 - 4.80 K/uL    Monos (Absolute) 0.65 0.00 - 0.85 K/uL    Eos (Absolute) 0.23 0.00 - 0.51 K/uL    Baso (Absolute) 0.08 0.00 - 0.12 K/uL    Immature Granulocytes (abs) 0.04 0.00 - 0.11 K/uL    NRBC (Absolute) 0.00 K/uL   COMP METABOLIC PANEL   Result  Value Ref Range    Sodium 139 135 - 145 mmol/L    Potassium 3.6 3.6 - 5.5 mmol/L    Chloride 103 96 - 112 mmol/L    Co2 21 20 - 33 mmol/L    Anion Gap 15.0 7.0 - 16.0    Glucose 110 (H) 65 - 99 mg/dL    Bun 10 8 - 22 mg/dL    Creatinine 0.71 0.50 - 1.40 mg/dL    Calcium 9.5 8.5 - 10.5 mg/dL    AST(SGOT) 13 12 - 45 U/L    ALT(SGPT) 14 2 - 50 U/L    Alkaline Phosphatase 143 (H) 30 - 99 U/L    Total Bilirubin 0.3 0.1 - 1.5 mg/dL    Albumin 4.3 3.2 - 4.9 g/dL    Total Protein 7.6 6.0 - 8.2 g/dL    Globulin 3.3 1.9 - 3.5 g/dL    A-G Ratio 1.3 g/dL   Sed Rate   Result Value Ref Range    Sed Rate Westergren 32 (H) 0 - 20 mm/hour   CRP HIGH SENSITIVE (CARDIAC)   Result Value Ref Range    C Reactive Protein High Sensitive 9.4 (H) 0.0 - 7.5 mg/L   ESTIMATED GFR   Result Value Ref Range    GFR If African American >60 >60 mL/min/1.73 m 2    GFR If Non African American >60 >60 mL/min/1.73 m 2         COURSE & MEDICAL DECISION MAKING  Pertinent Labs & Imaging studies reviewed. (See chart for details)  CT scans of the spine showed evidence of arthritis, no trauma, no destructive lesions.  Patient has polyarthralgia of unknown etiology with elevated sedimentation rate and CRP despite taking oral prednisone.  Patient has normal white blood cell count, no fever, infection unlikely.  According to his advocate, this patient is unable to help himself at home, difficulty even walking when typically he works.  Plan to hospitalize him for pain control, ongoing evaluation and treatment.  Patient given Toradol and morphine in the emergency department with minimal relief.    FINAL IMPRESSION  1. Polyarthralgia     2. Gait instability             Electronically signed by: Killian Lucas M.D., 10/1/2020 7:11 PM

## 2020-10-03 VITALS
DIASTOLIC BLOOD PRESSURE: 80 MMHG | BODY MASS INDEX: 26.66 KG/M2 | RESPIRATION RATE: 16 BRPM | WEIGHT: 180 LBS | HEART RATE: 62 BPM | SYSTOLIC BLOOD PRESSURE: 129 MMHG | HEIGHT: 69 IN | TEMPERATURE: 98.6 F | OXYGEN SATURATION: 95 %

## 2020-10-03 LAB
25(OH)D3 SERPL-MCNC: 24 NG/ML (ref 30–100)
ANION GAP SERPL CALC-SCNC: 12 MMOL/L (ref 7–16)
BASOPHILS # BLD AUTO: 0.7 % (ref 0–1.8)
BASOPHILS # BLD: 0.06 K/UL (ref 0–0.12)
BUN SERPL-MCNC: 16 MG/DL (ref 8–22)
CALCIUM SERPL-MCNC: 8.8 MG/DL (ref 8.5–10.5)
CHLORIDE SERPL-SCNC: 102 MMOL/L (ref 96–112)
CO2 SERPL-SCNC: 22 MMOL/L (ref 20–33)
CREAT SERPL-MCNC: 0.75 MG/DL (ref 0.5–1.4)
CRP SERPL HS-MCNC: 1.03 MG/DL (ref 0–0.75)
EOSINOPHIL # BLD AUTO: 0.19 K/UL (ref 0–0.51)
EOSINOPHIL NFR BLD: 2.2 % (ref 0–6.9)
ERYTHROCYTE [DISTWIDTH] IN BLOOD BY AUTOMATED COUNT: 41.6 FL (ref 35.9–50)
ERYTHROCYTE [SEDIMENTATION RATE] IN BLOOD BY WESTERGREN METHOD: 21 MM/HOUR (ref 0–20)
GLUCOSE SERPL-MCNC: 102 MG/DL (ref 65–99)
HCT VFR BLD AUTO: 36.3 % (ref 42–52)
HGB BLD-MCNC: 12.4 G/DL (ref 14–18)
IMM GRANULOCYTES # BLD AUTO: 0.03 K/UL (ref 0–0.11)
IMM GRANULOCYTES NFR BLD AUTO: 0.4 % (ref 0–0.9)
LYMPHOCYTES # BLD AUTO: 2.28 K/UL (ref 1–4.8)
LYMPHOCYTES NFR BLD: 26.9 % (ref 22–41)
MAGNESIUM SERPL-MCNC: 2.1 MG/DL (ref 1.5–2.5)
MCH RBC QN AUTO: 28.8 PG (ref 27–33)
MCHC RBC AUTO-ENTMCNC: 34.2 G/DL (ref 33.7–35.3)
MCV RBC AUTO: 84.4 FL (ref 81.4–97.8)
MONOCYTES # BLD AUTO: 0.65 K/UL (ref 0–0.85)
MONOCYTES NFR BLD AUTO: 7.7 % (ref 0–13.4)
NEUTROPHILS # BLD AUTO: 5.26 K/UL (ref 1.82–7.42)
NEUTROPHILS NFR BLD: 62.1 % (ref 44–72)
NRBC # BLD AUTO: 0 K/UL
NRBC BLD-RTO: 0 /100 WBC
PHOSPHATE SERPL-MCNC: 3.7 MG/DL (ref 2.5–4.5)
PLATELET # BLD AUTO: 289 K/UL (ref 164–446)
PMV BLD AUTO: 9.9 FL (ref 9–12.9)
POTASSIUM SERPL-SCNC: 3.7 MMOL/L (ref 3.6–5.5)
PROCALCITONIN SERPL-MCNC: <0.05 NG/ML
RBC # BLD AUTO: 4.3 M/UL (ref 4.7–6.1)
SODIUM SERPL-SCNC: 136 MMOL/L (ref 135–145)
TSH SERPL DL<=0.005 MIU/L-ACNC: 1.77 UIU/ML (ref 0.38–5.33)
URATE SERPL-MCNC: 9.8 MG/DL (ref 2.5–8.3)
WBC # BLD AUTO: 8.5 K/UL (ref 4.8–10.8)

## 2020-10-03 PROCEDURE — 84100 ASSAY OF PHOSPHORUS: CPT

## 2020-10-03 PROCEDURE — 700111 HCHG RX REV CODE 636 W/ 250 OVERRIDE (IP): Performed by: HOSPITALIST

## 2020-10-03 PROCEDURE — 97162 PT EVAL MOD COMPLEX 30 MIN: CPT

## 2020-10-03 PROCEDURE — 84145 PROCALCITONIN (PCT): CPT

## 2020-10-03 PROCEDURE — 85652 RBC SED RATE AUTOMATED: CPT

## 2020-10-03 PROCEDURE — 86140 C-REACTIVE PROTEIN: CPT

## 2020-10-03 PROCEDURE — A9270 NON-COVERED ITEM OR SERVICE: HCPCS | Performed by: INTERNAL MEDICINE

## 2020-10-03 PROCEDURE — 85025 COMPLETE CBC W/AUTO DIFF WBC: CPT

## 2020-10-03 PROCEDURE — 96376 TX/PRO/DX INJ SAME DRUG ADON: CPT

## 2020-10-03 PROCEDURE — 84550 ASSAY OF BLOOD/URIC ACID: CPT

## 2020-10-03 PROCEDURE — 82306 VITAMIN D 25 HYDROXY: CPT

## 2020-10-03 PROCEDURE — 700102 HCHG RX REV CODE 250 W/ 637 OVERRIDE(OP): Performed by: INTERNAL MEDICINE

## 2020-10-03 PROCEDURE — 83735 ASSAY OF MAGNESIUM: CPT

## 2020-10-03 PROCEDURE — 700102 HCHG RX REV CODE 250 W/ 637 OVERRIDE(OP): Performed by: HOSPITALIST

## 2020-10-03 PROCEDURE — G0378 HOSPITAL OBSERVATION PER HR: HCPCS

## 2020-10-03 PROCEDURE — 96372 THER/PROPH/DIAG INJ SC/IM: CPT

## 2020-10-03 PROCEDURE — 99217 PR OBSERVATION CARE DISCHARGE: CPT | Performed by: INTERNAL MEDICINE

## 2020-10-03 PROCEDURE — 84443 ASSAY THYROID STIM HORMONE: CPT

## 2020-10-03 PROCEDURE — A9270 NON-COVERED ITEM OR SERVICE: HCPCS | Performed by: HOSPITALIST

## 2020-10-03 PROCEDURE — 700111 HCHG RX REV CODE 636 W/ 250 OVERRIDE (IP): Performed by: INTERNAL MEDICINE

## 2020-10-03 PROCEDURE — 80048 BASIC METABOLIC PNL TOTAL CA: CPT

## 2020-10-03 RX ORDER — ERGOCALCIFEROL 1.25 MG/1
50000 CAPSULE ORAL
Status: DISCONTINUED | OUTPATIENT
Start: 2020-10-03 | End: 2020-10-03 | Stop reason: HOSPADM

## 2020-10-03 RX ORDER — COLCHICINE 0.6 MG/1
0.6 TABLET ORAL DAILY
Qty: 30 TAB | Refills: 0 | Status: SHIPPED | OUTPATIENT
Start: 2020-10-04 | End: 2020-11-03

## 2020-10-03 RX ORDER — METHYLPREDNISOLONE 4 MG/1
TABLET ORAL
Qty: 21 TAB | Refills: 0 | Status: SHIPPED | OUTPATIENT
Start: 2020-10-11

## 2020-10-03 RX ORDER — PREDNISONE 20 MG/1
20 TABLET ORAL 2 TIMES DAILY
Qty: 14 TAB | Refills: 0 | Status: SHIPPED | OUTPATIENT
Start: 2020-10-03 | End: 2020-10-03 | Stop reason: SDUPTHER

## 2020-10-03 RX ORDER — COLCHICINE 0.6 MG/1
0.6 TABLET ORAL DAILY
Qty: 30 TAB | Refills: 0 | Status: SHIPPED | OUTPATIENT
Start: 2020-10-04 | End: 2020-10-03 | Stop reason: SDUPTHER

## 2020-10-03 RX ORDER — PREDNISONE 20 MG/1
20 TABLET ORAL 2 TIMES DAILY
Qty: 14 TAB | Refills: 0 | Status: SHIPPED | OUTPATIENT
Start: 2020-10-03 | End: 2020-10-10

## 2020-10-03 RX ORDER — METHYLPREDNISOLONE 4 MG/1
TABLET ORAL
Qty: 21 TAB | Refills: 0 | Status: SHIPPED | OUTPATIENT
Start: 2020-10-11 | End: 2020-10-03 | Stop reason: SDUPTHER

## 2020-10-03 RX ADMIN — OMEPRAZOLE 20 MG: 20 CAPSULE, DELAYED RELEASE ORAL at 05:04

## 2020-10-03 RX ADMIN — METHYLPREDNISOLONE SODIUM SUCCINATE 40 MG: 40 INJECTION, POWDER, FOR SOLUTION INTRAMUSCULAR; INTRAVENOUS at 05:03

## 2020-10-03 RX ADMIN — RIFAMPIN 300 MG: 300 CAPSULE ORAL at 09:41

## 2020-10-03 RX ADMIN — COLCHICINE 0.6 MG: 0.6 TABLET ORAL at 05:03

## 2020-10-03 RX ADMIN — OXYCODONE 5 MG: 5 TABLET ORAL at 01:18

## 2020-10-03 RX ADMIN — ERGOCALCIFEROL 50000 UNITS: 1.25 CAPSULE ORAL at 09:41

## 2020-10-03 RX ADMIN — ENOXAPARIN SODIUM 40 MG: 40 INJECTION SUBCUTANEOUS at 05:03

## 2020-10-03 RX ADMIN — OXYCODONE 5 MG: 5 TABLET ORAL at 09:41

## 2020-10-03 RX ADMIN — KETOROLAC TROMETHAMINE 30 MG: 30 INJECTION, SOLUTION INTRAMUSCULAR; INTRAVENOUS at 05:03

## 2020-10-03 ASSESSMENT — COGNITIVE AND FUNCTIONAL STATUS - GENERAL
SUGGESTED CMS G CODE MODIFIER MOBILITY: CH
MOBILITY SCORE: 24

## 2020-10-03 ASSESSMENT — GAIT ASSESSMENTS
ASSISTIVE DEVICE: FRONT WHEEL WALKER
DISTANCE (FEET): 250
GAIT LEVEL OF ASSIST: SUPERVISED

## 2020-10-03 ASSESSMENT — PAIN DESCRIPTION - PAIN TYPE: TYPE: ACUTE PAIN

## 2020-10-03 ASSESSMENT — PAIN SCALES - WONG BAKER
WONGBAKER_NUMERICALRESPONSE: HURTS JUST A LITTLE BIT
WONGBAKER_NUMERICALRESPONSE: HURTS JUST A LITTLE BIT

## 2020-10-03 NOTE — THERAPY
Physical Therapy   Initial Evaluation     Patient Name: Fede Carrillo  Age:  59 y.o., Sex:  male  Medical Record #: [id number]  Today's Date: 10/3/2020     Precautions: Other (See Comments)    Assessment  Patient is 59 y.o. male admitted for polyarthritis workup presenting to PT most limited by pain to his back and knees, but able to complete x250' of gait with FWW and manage x11 stairs at supervision. Although pt denied sensory impairment, he reported if he walks or sits too long his bilateral calves and hips become sore and swollen. At this point, pt does not appear to require further acute PT services as his issues appear primarily medical. Would benefit from FWW to offload LE on painful days.      Plan    Recommend Physical Therapy for Evaluation only     DC Equipment Recommendations: Front-Wheel Walker  Discharge Recommendations: Recommend outpatient physical therapy services to address higher level deficits          Objective       10/03/20 0915   Prior Living Situation   Prior Services None   Housing / Facility 2 Story Apartment / Condo   Steps Into Home 10   Equipment Owned None   Lives with - Patient's Self Care Capacity Unrelated Adult   Comments Pt reports his roommate works long shifts and cannot assist him.    Prior Level of Functional Mobility   Bed Mobility Independent   Transfer Status Independent   Ambulation Independent   Distance Ambulation (Feet)   (community)   Assistive Devices Used None   Stairs Independent   Balance Assessment   Comments seated indep; standing with FWW   Gait Analysis   Gait Level Of Assist Supervised   Assistive Device Front Wheel Walker   Distance (Feet) 250   Level of Assist with Stairs Supervised   Weight Bearing Status FWB   Comments cues for use of FWW as pt had not used before. Further education on managing FWW up/down stairs by folding and carrying. Seated rest break briefly.    Bed Mobility    Supine to Sit Supervised   Scooting Supervised   Rolling  Supervised   Functional Mobility   Sit to Stand Supervised   Bed, Chair, Wheelchair Transfer Supervised   Transfer Method Stand Step   Anticipated Discharge Equipment and Recommendations   DC Equipment Recommendations Front-Wheel Walker   Discharge Recommendations Recommend outpatient physical therapy services to address higher level deficits

## 2020-10-03 NOTE — PROGRESS NOTES
Assumed pt care at 0700. Received report from  RN. A&O x4. Pt c/o 6/10 abd pain, medicated per MAR. Respirations even and unlabored on RA. NP to bedside with RN, discharge plan explained to pt with assistance from certified  Kevin.   Bed locked and in lowest position. Call light and belongings within reach. Non-skid socks in place. Needs met, will continue to monitor.

## 2020-10-03 NOTE — DISCHARGE PLANNING
Patient requesting medication assistance.  Assiste with last admission .  New prescriptions can be found on $4 list.

## 2020-10-03 NOTE — DISCHARGE INSTRUCTIONS
Discharge Instructions per MILAGRO Patterson  -Follow-up with PCP, information to [facility] provided  -Referral to go see rheumatologists  -Colchicine and steroid prescription sent to pharmacy of choice    DIET: As tolerated    ACTIVITY: As tolerated    DIAGNOSIS: Polyarthralgia    Return to ER if symptoms persist, chest pain, palpitations, shortness of breath, numbness, tingling, weakness, and high fevers.    Discharge Instructions    Discharged to home by car with friend. Discharged via wheelchair, hospital escort: Yes.  Special equipment needed: Not Applicable    Be sure to schedule a follow-up appointment with your primary care doctor or any specialists as instructed.     Discharge Plan:   Diet Plan: Discussed  Activity Level: Discussed  Confirmed Follow up Appointment: Patient to Call and Schedule Appointment  Confirmed Symptoms Management: Discussed  Medication Reconciliation Updated: Yes  Influenza Vaccine Indication: Not indicated: Previously immunized this influenza season and > 8 years of age    I understand that a diet low in cholesterol, fat, and sodium is recommended for good health. Unless I have been given specific instructions below for another diet, I accept this instruction as my diet prescription.   Other diet: Heart healthy    Special Instructions: None    · Is patient discharged on Warfarin / Coumadin?   No     Depression / Suicide Risk    As you are discharged from this [facility] facility, it is important to learn how to keep safe from harming yourself.    Recognize the warning signs:  · Abrupt changes in personality, positive or negative- including increase in energy   · Giving away possessions  · Change in eating patterns- significant weight changes-  positive or negative  · Change in sleeping patterns- unable to sleep or sleeping all the time   · Unwillingness or inability to communicate  · Depression  · Unusual sadness, discouragement and loneliness  · Talk of  wanting to die  · Neglect of personal appearance   · Rebelliousness- reckless behavior  · Withdrawal from people/activities they love  · Confusion- inability to concentrate     If you or a loved one observes any of these behaviors or has concerns about self-harm, here's what you can do:  · Talk about it- your feelings and reasons for harming yourself  · Remove any means that you might use to hurt yourself (examples: pills, rope, extension cords, firearm)  · Get professional help from the community (Mental Health, Substance Abuse, psychological counseling)  · Do not be alone:Call your Safe Contact- someone whom you trust who will be there for you.  · Call your local CRISIS HOTLINE 636-3541 or 500-198-6786  · Call your local Children's Mobile Crisis Response Team [place] (197) 977-5441 or www.LearnZillion  · Call the toll free National Suicide Prevention Hotlines   · National Suicide Prevention Lifeline 637-303-GTXM (2273)  · National Hope Line Network 800-SUICIDE (509-2288)

## 2020-10-03 NOTE — DISCHARGE SUMMARY
Discharge Summary    CHIEF COMPLAINT ON ADMISSION  Chief Complaint   Patient presents with   • Head Pain     Pt reports to have arthritis and is having increased pain all over, chronic since fall a couple years ago.    • Neck Pain   • Knee Pain   • Joint Pain   • Back Pain   • Abdominal Swelling       Reason for Admission  Pain     Admission Date  10/1/2020    CODE STATUS  Full Code    HPI & HOSPITAL COURSE  Mr. Christopher Carrillo is a 59 y.o. male who presented on 10/1/2020 with generalized arthralgias for months.  He was recently hospitalized in July with generalized arthralgias with concern for inflammatory arthritis and possible brucellosis.  Extensive work-up included knee aspiration which revealed urate crystals and culture negative.  He also had positive interferon and started on rifampin.  Previous rheumatoid factor, CCP, RATI unremarkable.  Patient did not follow-up to establish primary care provider.  Presented to urgent care 20 days ago and received another tapering dose of prednisone.  He reports mild epigastric pain worse since he received steroids.  Denies nausea, vomiting, melena.  Patient did have elevated ESR, CRP, uric acid on presentation.  CT lumbar and thoracic spine showed degenerative disc disease with mild canal stenosis at L3-4.  Plain films of hand showed degenerative changes, chronic ligamentous injury.      During this hospital stay, patient was given steroid therapy and PT/OT.  Patient will be given a walker to go home with.  Patient highly recommended to follow-up with rheumatologist in order to help with his polyarthralgia.  Patient also highly recommended to follow-up with PCP in order to maintain preventive health.  Patient will be prescribed colchicine for 30 days along with steroid therapy with a taper dose to help with the polyarthralgia with swelling.  All questions and concerns answered prior to being discharged.  Patient will be discharged home.       Therefore, he is discharged  in good and stable condition to home with close outpatient follow-up.    The patient met 2-midnight criteria for an inpatient stay at the time of discharge.    Discharge Date  10/03/20       FOLLOW UP ITEMS POST DISCHARGE  Please call [phone] to schedule PCP appointment for patient.    Required specialty appointments include:       Discharge Instructions per MILAGRO Patterson  -Follow-up with PCP, information to [facility]  -Referral to go see rheumatologists  -Colchicine and steroid prescription sent to pharmacy of choice    DIET: As tolerated    ACTIVITY: As tolerated    DIAGNOSIS: Polyarthralgia    Return to ER if symptoms persist, chest pain, palpitations, shortness of breath, numbness, tingling, weakness, and high fevers.    DISCHARGE DIAGNOSES  Principal Problem:    Polyarthralgia with peripheral swelling POA: Yes  Active Problems:    Latent tuberculosis POA: Yes    Gout POA: Unknown    Dyspepsia POA: Unknown  Resolved Problems:    * No resolved hospital problems. *      FOLLOW UP  No future appointments.  [address] [address] [phone]  Schedule an appointment as soon as possible for a visit in 1 week        MEDICATIONS ON DISCHARGE     Medication List      START taking these medications      Instructions   colchicine 0.6 MG Tabs  Start taking on: October 4, 2020  Commonly known as: COLCRYS   Take 1 Tab by mouth every day for 30 days.  Dose: 0.6 mg     methylPREDNISolone 4 MG Tbpk  Start taking on: October 11, 2020  Commonly known as: MEDROL DOSEPAK   Follow schedule on package instructions.     predniSONE 20 MG Tabs  Commonly known as: DELTASONE   Take 1 Tab by mouth 2 times a day for 7 days.  Dose: 20 mg        CONTINUE taking these medications      Instructions   acetaminophen 325 MG Tabs  Commonly known as: TYLENOL   Take 650 mg by mouth every four hours as needed for Moderate Pain.  Dose: 650 mg     riFAMPin  300 MG Caps  Commonly known as: RIFADINE   Take 300 mg by mouth 2 times a day.  Dose: 300 mg            Allergies  No Known Allergies    DIET  Orders Placed This Encounter   Procedures   • Diet Order Cardiac     Standing Status:   Standing     Number of Occurrences:   1     Order Specific Question:   Diet:     Answer:   Cardiac [6]       ACTIVITY  As tolerated.  Weight bearing as tolerated    CONSULTATIONS  -Patient needs to follow-up with a rheumatologist    PROCEDURES  None    IMAGING  DX-HAND 3+ LEFT   Final Result         1.  Persistent widening of the scapholunate interval, appearance suggests chronic ligamentous injury   2.  Degenerative changes of the interphalangeal joints, greater involving the proximal interphalangeal joints.      DX-HAND 3+ RIGHT   Final Result         1.  Widening of the scapholunate interval, appearance favors ligamentous injury.   2.  Degenerative changes of the metacarpal and interphalangeal joints with areas of bony erosion, consider erosive arthropathy      CT-TSPINE W/O PLUS RECONS   Final Result      Minimal thoracic spondylosis. No acute fracture or listhesis in the thoracic spine.      CT-LSPINE W/O PLUS RECONS   Final Result      1.  There is no acute fracture or malalignment of the lumbar spine.   2.  There is mild degenerative disc disease with endplate spurring and disc bulges at the L3-4 and L4-5 levels with mild canal stenosis or neural foraminal narrowing predominantly at the L3-4 level.            LABORATORY  Lab Results   Component Value Date    SODIUM 136 10/03/2020    POTASSIUM 3.7 10/03/2020    CHLORIDE 102 10/03/2020    CO2 22 10/03/2020    GLUCOSE 102 (H) 10/03/2020    BUN 16 10/03/2020    CREATININE 0.75 10/03/2020        Lab Results   Component Value Date    WBC 8.5 10/03/2020    HEMOGLOBIN 12.4 (L) 10/03/2020    HEMATOCRIT 36.3 (L) 10/03/2020    PLATELETCT 289 10/03/2020        Total time of the discharge process exceeds 36  minutes.  ================================================================================================Please note that this dictation was created using voice recognition software. I have made every reasonable attempt to correct obvious errors, but there may be errors of grammar and possibly content that I did not discover before finalizing the note.    Electronically signed by:  MIKE Estrella, MSN, APRN, FNP-C  Hospitalist Services  [facility]  [phone]  [email or web address]  10/03/20    1003

## 2020-10-04 LAB
CCP IGG SERPL-ACNC: 4 UNITS (ref 0–19)
NUCLEAR IGG SER QL IA: NORMAL

## 2020-11-19 ENCOUNTER — HOSPITAL ENCOUNTER (OUTPATIENT)
Dept: RADIOLOGY | Facility: MEDICAL CENTER | Age: 60
End: 2020-11-19
Attending: NURSE PRACTITIONER

## 2020-11-19 DIAGNOSIS — R10.84 GENERALIZED ABDOMINAL PAIN: ICD-10-CM

## 2021-02-09 ENCOUNTER — HOSPITAL ENCOUNTER (EMERGENCY)
Facility: MEDICAL CENTER | Age: 61
End: 2021-02-09
Attending: EMERGENCY MEDICINE

## 2021-02-09 VITALS
TEMPERATURE: 97.3 F | DIASTOLIC BLOOD PRESSURE: 79 MMHG | HEIGHT: 69 IN | WEIGHT: 171.96 LBS | HEART RATE: 80 BPM | SYSTOLIC BLOOD PRESSURE: 128 MMHG | BODY MASS INDEX: 25.47 KG/M2 | RESPIRATION RATE: 16 BRPM | OXYGEN SATURATION: 97 %

## 2021-02-09 DIAGNOSIS — H53.8 BLURRY VISION, LEFT EYE: ICD-10-CM

## 2021-02-09 LAB
ALBUMIN SERPL BCP-MCNC: 4.4 G/DL (ref 3.2–4.9)
ALBUMIN/GLOB SERPL: 1.5 G/DL
ALP SERPL-CCNC: 118 U/L (ref 30–99)
ALT SERPL-CCNC: 28 U/L (ref 2–50)
ANION GAP SERPL CALC-SCNC: 14 MMOL/L (ref 7–16)
AST SERPL-CCNC: 21 U/L (ref 12–45)
BASOPHILS # BLD AUTO: 0.7 % (ref 0–1.8)
BASOPHILS # BLD: 0.08 K/UL (ref 0–0.12)
BILIRUB SERPL-MCNC: 0.3 MG/DL (ref 0.1–1.5)
BUN SERPL-MCNC: 18 MG/DL (ref 8–22)
CALCIUM SERPL-MCNC: 9 MG/DL (ref 8.5–10.5)
CHLORIDE SERPL-SCNC: 102 MMOL/L (ref 96–112)
CO2 SERPL-SCNC: 20 MMOL/L (ref 20–33)
CREAT SERPL-MCNC: 0.83 MG/DL (ref 0.5–1.4)
EOSINOPHIL # BLD AUTO: 0.25 K/UL (ref 0–0.51)
EOSINOPHIL NFR BLD: 2.3 % (ref 0–6.9)
ERYTHROCYTE [DISTWIDTH] IN BLOOD BY AUTOMATED COUNT: 46.5 FL (ref 35.9–50)
GLOBULIN SER CALC-MCNC: 2.9 G/DL (ref 1.9–3.5)
GLUCOSE SERPL-MCNC: 126 MG/DL (ref 65–99)
HCT VFR BLD AUTO: 41 % (ref 42–52)
HGB BLD-MCNC: 13.4 G/DL (ref 14–18)
IMM GRANULOCYTES # BLD AUTO: 0.04 K/UL (ref 0–0.11)
IMM GRANULOCYTES NFR BLD AUTO: 0.4 % (ref 0–0.9)
LYMPHOCYTES # BLD AUTO: 2.19 K/UL (ref 1–4.8)
LYMPHOCYTES NFR BLD: 20.1 % (ref 22–41)
MCH RBC QN AUTO: 28.8 PG (ref 27–33)
MCHC RBC AUTO-ENTMCNC: 32.7 G/DL (ref 33.7–35.3)
MCV RBC AUTO: 88.2 FL (ref 81.4–97.8)
MONOCYTES # BLD AUTO: 0.93 K/UL (ref 0–0.85)
MONOCYTES NFR BLD AUTO: 8.5 % (ref 0–13.4)
NEUTROPHILS # BLD AUTO: 7.41 K/UL (ref 1.82–7.42)
NEUTROPHILS NFR BLD: 68 % (ref 44–72)
NRBC # BLD AUTO: 0 K/UL
NRBC BLD-RTO: 0 /100 WBC
PLATELET # BLD AUTO: 378 K/UL (ref 164–446)
PMV BLD AUTO: 9.9 FL (ref 9–12.9)
POTASSIUM SERPL-SCNC: 4 MMOL/L (ref 3.6–5.5)
PROT SERPL-MCNC: 7.3 G/DL (ref 6–8.2)
RBC # BLD AUTO: 4.65 M/UL (ref 4.7–6.1)
SODIUM SERPL-SCNC: 136 MMOL/L (ref 135–145)
WBC # BLD AUTO: 10.9 K/UL (ref 4.8–10.8)

## 2021-02-09 PROCEDURE — 99283 EMERGENCY DEPT VISIT LOW MDM: CPT

## 2021-02-09 PROCEDURE — 85025 COMPLETE CBC W/AUTO DIFF WBC: CPT

## 2021-02-09 PROCEDURE — 80053 COMPREHEN METABOLIC PANEL: CPT

## 2021-02-09 PROCEDURE — 36415 COLL VENOUS BLD VENIPUNCTURE: CPT

## 2021-02-09 ASSESSMENT — FIBROSIS 4 INDEX: FIB4 SCORE: 0.71

## 2021-02-10 NOTE — ED TRIAGE NOTES
"Chief Complaint   Patient presents with   • Blurred Vision     pt has had blurry vision since january in his L eye. pt has peripheral vision intact and can see 3 feet away. pts pupil in L eye is non reactive to light. pts R pupil is about 1+       Pt walk in tonight for above complaint. Pt aox4. [facility] and was told to come to the ER because they were concerned for his eye going blind pts. L pupil is about a 4+. Pt has hx of arthritis and is on steroids for that. Pt also has hx of diabetes Per pts family member this happened today/.    Updated ER charge      /84   Pulse 89   Temp 36.6 °C (97.8 °F)   Resp 12   Ht 1.753 m (5' 9\")   Wt 78 kg (171 lb 15.3 oz)   SpO2 98%   BMI 25.39 kg/m²       "

## 2021-02-10 NOTE — ED NOTES
Rounded on patient, no needs at this time, patient laying on gurney, friend at bedside. Patient L pupil non-reactive to light, not round. Blurred vision and blurred vision on R eye as well.

## 2021-02-10 NOTE — ED NOTES
"Pt discharged home with family member. Pt is A/O x 4, Polish speaker, patient preferred family member to translate discharge instructions. IV discontinued and gauze placed, pt in possession of belongings. Pt provided discharge education and information pertaining to medications and follow up appointments. Educated patient to follow up with ophthalmology in two days, patient verbalized understanding. Dicussed signs and symptoms to return to the ER, patient verbalized understanding. Pt received copy of discharge instructions and verbalized understanding.     /79   Pulse 80   Temp 36.3 °C (97.3 °F) (Temporal)   Resp 16   Ht 1.753 m (5' 9\")   Wt 78 kg (171 lb 15.3 oz)   SpO2 97%   BMI 25.39 kg/m²     "

## 2021-02-10 NOTE — ED PROVIDER NOTES
ED Provider Note    CHIEF COMPLAINT  Chief Complaint   Patient presents with   • Blurred Vision     pt has had blurry vision since january in his L eye. pt has peripheral vision intact and can see 3 feet away. pts pupil in L eye is non reactive to light. pts R pupil is about 1+       HPI  Fede Carrillo is a 60 y.o. male who presents with blurred vision.  The patient states he had blurred vision in his left eye since January.  He states is progressively getting worse.  The patient unfortunately developed diabetes myelitis after he is on long-term steroids for arthritis.  The patient states he said progressive decreased vision over the last several months.  He does not currently have a headache.  Does not have any eye pain.  He was seen by his doctor today and noted that his pupil was irregular and sent in here for further evaluation.    REVIEW OF SYSTEMS  See HPI for further details. All other systems are negative.     PAST MEDICAL HISTORY  Past Medical History:   Diagnosis Date   • Arthritis        FAMILY HISTORY  [unfilled]    SOCIAL HISTORY  Social History     Socioeconomic History   • Marital status: Single     Spouse name: Not on file   • Number of children: Not on file   • Years of education: Not on file   • Highest education level: Not on file   Occupational History   • Not on file   Social Needs   • Financial resource strain: Not hard at all   • Food insecurity     Worry: Never true     Inability: Never true   • Transportation needs     Medical: No     Non-medical: No   Tobacco Use   • Smoking status: Current Every Day Smoker     Packs/day: 0.10     Years: 25.00     Pack years: 2.50     Types: Cigarettes   • Smokeless tobacco: Never Used   Substance and Sexual Activity   • Alcohol use: Yes     Frequency: 4 or more times a week     Drinks per session: 1 or 2     Comment: 2-3 beers daily   • Drug use: Not Currently   • Sexual activity: Not on file   Lifestyle   • Physical activity     Days per  "week: 6 days     Minutes per session: Not on file   • Stress: Only a little   Relationships   • Social connections     Talks on phone: Once a week     Gets together: Once a week     Attends Sikh service: Never     Active member of club or organization: No     Attends meetings of clubs or organizations: Never     Relationship status: Never    • Intimate partner violence     Fear of current or ex partner: Not on file     Emotionally abused: Not on file     Physically abused: Not on file     Forced sexual activity: Not on file   Other Topics Concern   • Not on file   Social History Narrative   • Not on file       SURGICAL HISTORY  Past Surgical History:   Procedure Laterality Date   • EYE SURGERY         CURRENT MEDICATIONS  Home Medications     Reviewed by Gina Huitron R.N. (Registered Nurse) on 02/09/21 at 1949  Med List Status: Partial   Medication Last Dose Status   acetaminophen (TYLENOL) 325 MG Tab  Active   methylPREDNISolone (MEDROL DOSEPAK) 4 MG Tablet Therapy Pack  Active   riFAMPin (RIFADINE) 300 MG Cap  Active                ALLERGIES  No Known Allergies    PHYSICAL EXAM  VITAL SIGNS: /84   Pulse 89   Temp 36.6 °C (97.8 °F)   Resp 12   Ht 1.753 m (5' 9\")   Wt 78 kg (171 lb 15.3 oz)   SpO2 98%   BMI 25.39 kg/m²       Constitutional: Well developed, Well nourished, No acute distress, Non-toxic appearance.   HENT: Normocephalic, Atraumatic, Bilateral external ears normal, Oropharynx moist, No oral exudates, Nose normal.   Eyes: Right pupil is 2 and reactive, left pupil is irregular with a deficit to the iris and very minimal vision present in the left eye.  He can see fingers from approximately a foot away but it is very blurred.  Extraocular motor function is intact bilaterally  Neck: Normal range of motion, No tenderness, Supple, No stridor.   Lymphatic: No lymphadenopathy noted.   Cardiovascular: Normal heart rate, Normal rhythm, No murmurs, No rubs, No gallops.   Thorax & " Lungs: Normal breath sounds, No respiratory distress, No wheezing, No chest tenderness.   Abdomen: Bowel sounds normal, Soft, No tenderness, No masses, No pulsatile masses.   Skin: Warm, Dry, No erythema, No rash.   Back: No tenderness, No CVA tenderness.   Extremities: Intact distal pulses, No edema, No tenderness, No cyanosis, No clubbing.   Neurologic: Alert & oriented x 3, Normal motor function, Normal sensory function, No focal deficits noted.   Psychiatric: Affect normal, Judgment normal, Mood normal.     COURSE & MEDICAL DECISION MAKING  Pertinent Labs & Imaging studies reviewed. (See chart for details)  This a 60-year-old gentleman who presents the emerge department decreased vision to the left eye.  I suspect this is from the steroids that he has been on long-term as well as diabetic retinopathy.  He does have an irregular pupil and it looks like it is from deterioration or injury to the iris.  The patient has had this for quite some time.  I spoke with the ophthalmologist and she wants to see the patient in the office with a retinal specialist.  The patient's blood work was ordered in triage and does show slight elevation in the blood sugar consistent with his diabetes myelitis.  Clinically do not appreciate any evidence of infection.  His intraocular pressure in the left was 24.    FINAL IMPRESSION  1.  Decreased vision left eye  2.  Diabetes mellitus      Disposition  The patient will be discharged in stable condition         Electronically signed by: Ludwig Cline M.D., 2/9/2021 8:26 PM

## 2022-07-14 NOTE — PROGRESS NOTES
Daily Progress Note:     Date of Service: 8/1/2020  Primary Team: [facility] Green Team   Attending: Yash Calderon M.D.   Senior Resident: Dr. Starr  Intern: Dr. Marinelli  Contact:  [phone]    Chief Complaint:   Polyarthropathy  Latent TB  Gout    Subjective  No acute events overnight reported.  Patient getting better, now independently walking in the hallways using walking device.  Patient controlled, patient denied severe pain.  Patient tolerating well oral intake, constipation resolved.  Awaiting for Insurance approval to get transferred to SNF, likely not leaving till next week.  CM update from today 8/1 confirmed pending status authorization from [employer].    Consultants/Specialty:  ID  Orthopaedic    Review of Systems:     Review of Systems   Constitutional: Negative for chills, diaphoresis, fever and malaise/fatigue.   HENT: Negative for congestion, ear discharge, hearing loss, sore throat and tinnitus.    Eyes: Negative for blurred vision, double vision and photophobia.   Respiratory: Negative for cough, hemoptysis, sputum production and shortness of breath.    Cardiovascular: Negative for chest pain, palpitations, orthopnea and leg swelling.   Gastrointestinal: Negative for abdominal pain, blood in stool, constipation, heartburn, melena, nausea and vomiting.   Genitourinary: Negative for dysuria, frequency, hematuria and urgency.   Musculoskeletal: Positive for joint pain. Negative for back pain, falls, myalgias and neck pain.   Skin: Negative for itching and rash.   Neurological: Negative for dizziness, sensory change, speech change, focal weakness and headaches.   Endo/Heme/Allergies: Negative for environmental allergies. Does not bruise/bleed easily.   Psychiatric/Behavioral: Negative for depression, hallucinations and suicidal ideas.       Objective Data:   Physical Exam:   Vitals:   Temp:  [36.4 °C (97.5 °F)-36.8 °C (98.3 °F)] 36.5 °C (97.7 °F)  Pulse:  [58-87] 58  Resp:  [12-16] 16  BP:  (122-143)/(75-84) 122/76  SpO2:  [96 %-98 %] 97 %     Physical Exam  Vitals signs reviewed.   Constitutional:       General: He is not in acute distress.     Appearance: Normal appearance. He is not ill-appearing or diaphoretic.   HENT:      Head: Normocephalic and atraumatic.      Nose: Nose normal. No congestion or rhinorrhea.      Mouth/Throat:      Mouth: Mucous membranes are moist.      Pharynx: Oropharynx is clear. No oropharyngeal exudate or posterior oropharyngeal erythema.   Eyes:      General: No scleral icterus.     Extraocular Movements: Extraocular movements intact.      Conjunctiva/sclera: Conjunctivae normal.      Pupils: Pupils are equal, round, and reactive to light.   Neck:      Musculoskeletal: Normal range of motion and neck supple. No neck rigidity or muscular tenderness.   Cardiovascular:      Rate and Rhythm: Normal rate and regular rhythm.      Pulses: Normal pulses.      Heart sounds: Normal heart sounds. No murmur. No gallop.    Pulmonary:      Effort: Pulmonary effort is normal. No respiratory distress.      Breath sounds: Normal breath sounds. No wheezing, rhonchi or rales.   Abdominal:      General: Bowel sounds are normal. There is no distension.      Palpations: Abdomen is soft.      Tenderness: There is no abdominal tenderness. There is no guarding or rebound.   Musculoskeletal:         General: No swelling or tenderness.      Right lower leg: No edema.      Left lower leg: No edema.      Comments: Feet and ankle joints normal ROM, no swelling noticed, no pain.  Right knee back to normal, baseline ROM.  Left knee improvement in his ROM, mild pain and mild swelling, but now able to ambulate without assistance, uses a walker.  Wrists mild swelling on his right, moderate in his left, moderate reduced ROM.  Fingers moderate swelling, mild improvement, worse finger are 3rd and 4th left hand.   Skin:     General: Skin is warm.      Capillary Refill: Capillary refill takes less than 2  seconds.      Coloration: Skin is not jaundiced.      Findings: No erythema or rash.   Neurological:      General: No focal deficit present.      Mental Status: He is alert and oriented to person, place, and time. Mental status is at baseline.      Cranial Nerves: No cranial nerve deficit.   Psychiatric:         Mood and Affect: Mood normal.         Behavior: Behavior normal.         Thought Content: Thought content normal.           Labs:   Review    Imaging:   Review    Problem Representation:        Polyarthralgia with peripheral swelling  Assessment & Plan  - No previous Hx of Rheumatologic disease  - Denied family history of Rheumatoid conditions  - Patient stated that in the past he has had Joint swelling and pain, but he has been able to tolerate it and resolve with OTC NSAID's  - This is the first time it get very severe and first time that this many joints are involved  - Ankles, right knee and feet resolved pain and swelling.  - 7/30 Left Knee improved, patient now walking on hallways on his own, still has reduced ROM, but much better.  - Wrists and fingers continue to  Improve, left wrists still swelling more evident on his left side, ROM worse as well.  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    - VS at ED unremarkable  - Imaging knees showed mild degenerative disease  - Admission Labs: Cbc- 17.9 wbc, hb 12.6, Crp-12.61 (elevated) on, Esr-elevated at 56, Uric acid- 4.6  - ID consulted 7/27  - 8/1 Continues to improve in functioning and mobility, but not enough to go back home since he lives alone and must climb close to 20 high steep steps.  - CM working on SNF transfer, likely to occur till next week.                      PLAN:  - Transfer to Medical status since might not get DC till next week  -  Continue same management  - Cultures NGTD  - Autoimmune workup negative  - Pain management-   - Rifampin and Doxy started to cover Brucellosis   - ID ordered: Lyme titers negative, Blsto and Histoplasmosis serology negative, RPR negative  - CTM  - ID follow up 2 weeks after DC  - Encourage ambulation if possible  - DC to SNIF when approved  - Follow up outpatient with Rheum        Elevated liver enzymes  Assessment & Plan  - Normal liver enzymes on admission  - CMP 7/30 no significant changes, AST:64, ALT:162  - 8/1 Liver enzymes back to Normal limits  - CTM             PLAN:  - Continue monitoring   - Daily CMP      Latent tuberculosis  Assessment & Plan  - Positive quantiferon, lived in [place] until 15 years ago.  - Never treated or told he has abnormal tuberculosis testing previously.  - ID recommended Rifampin 4 months and stop doxy  - Follow up outpatient in 2 weeks post DC with ID    Hypokalemia  Assessment & Plan  - Resolved  - Patient eating well  - K 3.7 on admission  - 8/1 3.8  - CTM and replete if needed    Constipation  Assessment & Plan  - Resolved  - Pt has had now 2 more BM since given suppository    Acute gout of knee- (present on admission)  Assessment & Plan  - Arthrocentesis showed few monosodium urate crystals  - Hx of monoarthritis in the past for about 10-12 years, not severe stating that never went to the Dr for this type of pain, it would resolve in 1-2 days with OTC NSAID's  - This presentation was the first time with Polyarthropathy   - We are treating as acute gout as well.  - Patient will need outpatient Rheum follow up   - Also needs to establish with a PCP  - Continues to improve in mobility, now walking independently outside his room with walker.  - Patient was able to climbed few stairs with PT on 7/31, but patient stated that at home he has to climb close to 20 steps and home steps are double in height and he feels not capable to do so.  - CM working on DC to SNF     Patient

## 2024-04-04 NOTE — CARE PLAN
Problem: Knowledge Deficit  Goal: Knowledge of disease process/condition, treatment plan, diagnostic tests, and medications will improve  Outcome: PROGRESSING AS EXPECTED   Pt educated regarding plan of care and medications. All questions answered.     No